# Patient Record
Sex: FEMALE | Race: WHITE | NOT HISPANIC OR LATINO | Employment: FULL TIME | URBAN - METROPOLITAN AREA
[De-identification: names, ages, dates, MRNs, and addresses within clinical notes are randomized per-mention and may not be internally consistent; named-entity substitution may affect disease eponyms.]

---

## 2017-01-27 ENCOUNTER — HOSPITAL ENCOUNTER (OUTPATIENT)
Dept: LAB | Facility: MEDICAL CENTER | Age: 27
End: 2017-01-27
Attending: OBSTETRICS & GYNECOLOGY
Payer: COMMERCIAL

## 2017-01-27 PROCEDURE — 87491 CHLMYD TRACH DNA AMP PROBE: CPT

## 2017-01-27 PROCEDURE — 87624 HPV HI-RISK TYP POOLED RSLT: CPT

## 2017-01-27 PROCEDURE — 88175 CYTOPATH C/V AUTO FLUID REDO: CPT

## 2017-01-27 PROCEDURE — 87591 N.GONORRHOEAE DNA AMP PROB: CPT

## 2017-01-28 LAB
C TRACH DNA GENITAL QL NAA+PROBE: NEGATIVE
CYTOLOGY REG CYTOL: NORMAL
N GONORRHOEA DNA GENITAL QL NAA+PROBE: NEGATIVE
SPECIMEN SOURCE: NORMAL

## 2017-02-01 LAB
HPV HR 12 DNA CVX QL NAA+PROBE: NEGATIVE
HPV16 DNA SPEC QL NAA+PROBE: NEGATIVE
HPV18 DNA SPEC QL NAA+PROBE: NEGATIVE
SPECIMEN SOURCE: NORMAL

## 2017-03-19 ENCOUNTER — NON-PROVIDER VISIT (OUTPATIENT)
Dept: OCCUPATIONAL MEDICINE | Facility: CLINIC | Age: 27
End: 2017-03-19
Payer: COMMERCIAL

## 2017-03-19 ENCOUNTER — HOSPITAL ENCOUNTER (EMERGENCY)
Facility: MEDICAL CENTER | Age: 27
End: 2017-03-19
Attending: EMERGENCY MEDICINE
Payer: COMMERCIAL

## 2017-03-19 VITALS
TEMPERATURE: 98.7 F | BODY MASS INDEX: 20.89 KG/M2 | HEIGHT: 66 IN | DIASTOLIC BLOOD PRESSURE: 67 MMHG | WEIGHT: 130 LBS | SYSTOLIC BLOOD PRESSURE: 109 MMHG | HEART RATE: 69 BPM | RESPIRATION RATE: 16 BRPM

## 2017-03-19 DIAGNOSIS — Z02.83 ENCOUNTER FOR DRUG SCREENING: ICD-10-CM

## 2017-03-19 DIAGNOSIS — Z02.1 PRE-EMPLOYMENT DRUG SCREENING: ICD-10-CM

## 2017-03-19 PROCEDURE — 80300 POCT 11 PANEL URINE DRUG SCREEN - INSTANT: CPT | Performed by: INTERNAL MEDICINE

## 2017-03-19 PROCEDURE — 99026 IN-HOSPITAL ON CALL SERVICE: CPT | Performed by: INTERNAL MEDICINE

## 2017-03-19 PROCEDURE — 99283 EMERGENCY DEPT VISIT LOW MDM: CPT

## 2017-03-19 PROCEDURE — 82075 ASSAY OF BREATH ETHANOL: CPT | Performed by: INTERNAL MEDICINE

## 2017-03-19 RX ORDER — LIDOCAINE HYDROCHLORIDE 20 MG/ML
20 INJECTION, SOLUTION INFILTRATION; PERINEURAL ONCE
Status: DISCONTINUED | OUTPATIENT
Start: 2017-03-19 | End: 2017-03-19 | Stop reason: HOSPADM

## 2017-03-19 ASSESSMENT — PAIN SCALES - GENERAL: PAINLEVEL_OUTOF10: 2

## 2017-03-19 NOTE — ED AVS SNAPSHOT
3/19/2017          Belgica Cesar  [address]  [place] [address]    Dear Belgica:    [facility] wants to ensure your discharge home is safe and you or your loved ones have had all your questions answered regarding your care after you leave the hospital.    You may receive a telephone call within two days of your discharge.  This call is to make certain you understand your discharge instructions as well as ensure we provided you with the best care possible during your stay with us.     The call will only last approximately 3-5 minutes and will be done by a nurse.    Once again, we want to ensure your discharge home is safe and that you have a clear understanding of any next steps in your care.  If you have any questions or concerns, please do not hesitate to contact us, we are here for you.  Thank you for choosing [facility] for your healthcare needs.    Sincerely,    Jeferson Khoury    [facility]

## 2017-03-19 NOTE — DISCHARGE INSTRUCTIONS
Sterile Tape Wound Care  Some cuts and wounds can be closed using sterile tape, also called skin adhesive strips. Skin adhesive strips can be used for shallow (superficial) and simple cuts, wounds, lacerations, and surgical incisions. These strips act in place of stitches to hold the edges of the wound together, allowing for faster healing. Unlike stitches, the adhesive strips do not require needles or anesthetic medicine for placement. The strips will wear off naturally as the wound is healing. It is important to take proper care of your wound at home while it heals.   HOME CARE INSTRUCTIONS  · Try to keep the area around your wound clean and dry. Do not allow the adhesive strips to get wet for the first 12 hours.    · Do not use any soaps or ointments on the wound for the first 12 hours.    · If a bandage (dressing) has been applied, follow your health care provider's instructions for how often to change the dressing. Keep the dressing dry if one has been applied.    · Do not remove the adhesive strips. They will fall off on their own. If they do not, you may remove them gently after 10 days. You should gently wet the strips before removing them. For example, this can be done in the shower.  · Do not scratch, pick, or rub the wound area.    · Protect the wound from further injury until it is healed.    · Protect the wound from sun and tanning bed exposure while it is healing and for several weeks after healing.    · Only take over-the-counter or prescription medicines as directed by your health care provider.    · Keep all follow-up appointments as directed by your health care provider.    SEEK MEDICAL CARE IF:  Your adhesive strips become wet or soaked with blood before the wound has healed. The tape will need to be replaced.   SEEK IMMEDIATE MEDICAL CARE IF:  · You have increasing pain in the wound.    · You develop a rash after the strips are applied.  · Your wound becomes red, swollen, hot, or tender.    · You  have a red streak that goes away from the wound.    · You have pus coming from the wound.    · You have increased bleeding from the wound.  · You notice a bad smell coming from the wound.    · Your wound breaks open.  MAKE SURE YOU:  · Understand these instructions.  · Will watch your condition.  · Will get help right away if you are not doing well or get worse.     This information is not intended to replace advice given to you by your health care provider. Make sure you discuss any questions you have with your health care provider.     Document Released: 01/25/2006 Document Revised: 01/08/2016 Document Reviewed: 07/09/2014  [employer] Interactive Patient Education ©2016 [employer]

## 2017-03-19 NOTE — MR AVS SNAPSHOT
Belgica Cesar   3/19/2017 6:00 PM   Appointment   MRN: [id number]    Department:  [facility]   Dept Phone:  [phone]    Description:  Female : 1990   Provider:  OH NON RENLUCIE SAUNDERS           Allergies as of 3/19/2017     Not on File      Basic Information     Date Of Birth Sex Race Ethnicity Preferred Language    1990 Female White Non- English      Health Maintenance        Date Due Completion Dates    IMM HEP A VACCINE (1 of 2 - Standard Series) 1991 ---    IMM HPV VACCINE (1 of 3 - Female 3 Dose Series) 2001 ---    IMM VARICELLA (CHICKENPOX) VACCINE (1 of 2 - 2 Dose Adolescent Series) 2003 ---    IMM DTaP/Tdap/Td Vaccine (1 - Tdap) 2009 ---    IMM HEP B VACCINE (3 of 3 - Primary Series) 2016 3/17/2016, 2016    PAP SMEAR 2020            Current Immunizations     Hepatitis B Vaccine Recombivax (Adol/Adult) 3/17/2016, 2016    Influenza Vaccine Quad Inj (Pf) 10/6/2016    Influenza Vaccine Quad Inj (Preserved) 2016      Below and/or attached are the medications your provider expects you to take. Review all of your home medications and newly ordered medications with your provider and/or pharmacist. Follow medication instructions as directed by your provider and/or pharmacist. Please keep your medication list with you and share with your provider. Update the information when medications are discontinued, doses are changed, or new medications (including over-the-counter products) are added; and carry medication information at all times in the event of emergency situations     Allergies:  No Known Allergies          Medications  Valid as of: 2017 -  1:46 PM    Generic Name Brand Name Tablet Size Instructions for use    Cyclobenzaprine HCl (Tab) FLEXERIL 10 MG Take 1 Tab by mouth 3 times a day as needed.        .                 Medicines prescribed today were sent to:     [facility] [id number] [place] [address], [place] - [address] AT [facility] [address]    [address] [place] [address] [address] [place] [address]    Phone: [phone] Fax: [phone]    Open 24 Hours?: No      Medication refill instructions:       If your prescription bottle indicates you have medication refills left, it is not necessary to call your provider’s office. Please contact your pharmacy and they will refill your medication.    If your prescription bottle indicates you do not have any refills left, you may request refills at any time through one of the following ways: The online [employer] system (except Urgent Care), by calling your provider’s office, or by asking your pharmacy to contact your provider’s office with a refill request. Medication refills are processed only during regular business hours and may not be available until the next business day. Your provider may request additional information or to have a follow-up visit with you prior to refilling your medication.   *Please Note: Medication refills are assigned a new Rx number when refilled electronically. Your pharmacy may indicate that no refills were authorized even though a new prescription for the same medication is available at the pharmacy. Please request the medicine by name with the pharmacy before contacting your provider for a refill.           [employer] Access Code: Activation code not generated  Current [employer] Status: Active

## 2017-03-19 NOTE — LETTER
"  FORM C-4:  EMPLOYEE’S CLAIM FOR COMPENSATION/ REPORT OF INITIAL TREATMENT  EMPLOYEE’S CLAIM - PROVIDE ALL INFORMATION REQUESTED   First Name  Belgica Last Name  Arsenio Birthdate             Age  1990 26 y.o. 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 (if applicable)  Cleaning istrumentation equipment    How did this injury or occupational disease occur? Be specific and answer in detail. Use additional sheet if necessary)  I was cleaning an old label off of a container with a razor blade and it slipped and sliced my thumb   If you believe that you have an occupational disease, when did you first have knowledge of the disability and it relationship to your employment?  n/a Witnesses to the Accident  Mistyunruly Collados     Nature of Injury or Occupational Disease  Laceration  Part(s) of Body Injured or Affected  Thumb (L), N/A, N/A    I certify that the above is true and correct to the best of my knowledge and that I have provided this information in order to " obtain the benefits of [place]  I hereby authorize any physician, chiropractor, surgeon, practitioner, or other person, any hospital, including [facility], any medical service organization, any insurance company, or other institution or organization to release to each other, any medical or other information, including benefits paid or payable, pertinent to this injury or disease, except information relative to diagnosis, treatment and/or counseling for AIDS, psychological conditions, alcohol or controlled substances, for which I must give specific authorization.  A Photostat of this authorization shall be as valid as the original.   Date  03/19/2017 Place  [facility] Employee’s Signature   THIS REPORT MUST BE COMPLETED AND MAILED WITHIN 3 WORKING DAYS OF TREATMENT   Place  [facility], EMERGENCY DEPT  Name of Facility   [facility]   Date  3/19/2017 Diagnosis  (T14.8) Laceration Is there evidence the injured employee was under the influence of alcohol and/or another controlled substance at the time of accident?   Hour  2:25 PM Description of Injury or Disease  Laceration No   Treatment  Steri-Strips  Have you advised the patient to remain off work five days or more?         No   X-Ray Findings      If Yes   From Date    To Date      From information given by the employee, together with medical evidence, can you directly connect this injury or occupational disease as job incurred?  Yes If No, is the employee capable of: Full Duty  Yes Modified Duty      Is additional medical care by a physician indicated?  Yes If Modified Duty, Specify any Limitations / Restrictions        Do you know of any previous injury or disease contributing to this condition or occupational disease?  No   Date  3/19/2017 Print Doctor’s Name  Joon Mace  "certify the employer’s copy of this form was mailed on:03/19/2017   Address  [address] [place] [address]  [place] [address] [phone] Insurer’s Use Only   [facility]  [address]    Provider’s Tax ID Number  [id number] Telephone  Dept: [phone]    Doctor’s Signature  e-SignDE NILES FOFANA M.D. Degree  MD    Original - TREATING PHYSICIAN OR CHIROPRACTOR   Pg 2-Insurer/TPA   Pg 3-Employer   Pg 4-Employee                                                                                                  Form C-4 (rev01/03)     BRIEF DESCRIPTION OF RIGHTS AND BENEFITS  (Pursuant to [place] 616C.[place])    Notice of Injury or Occupational Disease (Incident Report Form C-1): If an injury or occupational disease (OD) arises out of and in the course of employment, you must provide written notice to your employer as soon as practicable, but no later than 7 days after the accident or OD. Your employer shall maintain a sufficient supply of the required forms.    Claim for Compensation (Form C-4): If medical treatment is sought, the form C-4 is available at the place of initial treatment. A completed \"Claim for Compensation\" (Form C-4) must be filed within 90 days after an accident or OD. The treating physician or chiropractor must, within 3 working days after treatment, complete and mail to the employer, the employer's insurer and third-party , the Claim for Compensation.    Medical Treatment: If you require medical treatment for your on-the-job injury or OD, you may be required to select a physician or chiropractor from a list provided by your workers’ compensation insurer, if it has contracted with an Organization for Managed Care (MCO) or Preferred Provider Organization (PPO) or providers of health care. If your employer has not entered into a contract with an MCO or PPO, you may select a physician or chiropractor from the Panel of Physicians and Chiropractors. Any medical costs related to your " industrial injury or OD will be paid by your insurer.    Temporary Total Disability (TTD): If your doctor has certified that you are unable to work for a period of at least 5 consecutive days, or 5 cumulative days in a 20-day period, or places restrictions on you that your employer does not accommodate, you may be entitled to TTD compensation.    Temporary Partial Disability (TPD): If the wage you receive upon reemployment is less than the compensation for TTD to which you are entitled, the insurer may be required to pay you TPD compensation to make up the difference. TPD can only be paid for a maximum of 24 months.    Permanent Partial Disability (PPD): When your medical condition is stable and there is an indication of a PPD as a result of your injury or OD, within 30 days, your insurer must arrange for an evaluation by a rating physician or chiropractor to determine the degree of your PPD. The amount of your PPD award depends on the date of injury, the results of the PPD evaluation and your age and wage.    Permanent Total Disability (PTD): If you are medically certified by a treating physician or chiropractor as permanently and totally disabled and have been granted a PTD status by your insurer, you are entitled to receive monthly benefits not to exceed 66 2/3% of your average monthly wage. The amount of your PTD payments is subject to reduction if you previously received a PPD award.    Vocational Rehabilitation Services: You may be eligible for vocational rehabilitation services if you are unable to return to the job due to a permanent physical impairment or permanent restrictions as a result of your injury or occupational disease.    Transportation and Per Srinivasan Reimbursement: You may be eligible for travel expenses and per srinivasan associated with medical treatment.  Reopening: You may be able to reopen your claim if your condition worsens after claim closure.    Appeal Process: If you disagree with a written  determination issued by the insurer or the insurer does not respond to your request, you may appeal to the Department of Administration, , by following the instructions contained in your determination letter. You must appeal the determination within 70 days from the date of the determination letter at [address], [address], [place], [place] [address], or [address], [address], [place], [place] [address]. If you disagree with the  decision, you may appeal to the Department of Administration, . You must file your appeal within 30 days from the date of the  decision letter at [address], [address], [place], [place] [address], or [address], [facility] [address], [place], [place] [address]. If you disagree with a decision of an , you may file a petition for judicial review with the District Court. You must do so within 30 days of the Appeal Officer’s decision. You may be represented by an  at your own expense or you may contact the [facility] for possible representation.    [place]  for Injured Workers (NAIW): If you disagree with a  decision, you may request that NAIW represent you without charge at an  Hearing. For information regarding denial of benefits, you may contact the [facility] at: [address], [address], [place] [address], [phone], or [address], [place] [address], [phone]    To File a Complaint with the Division: If you wish to file a complaint with the  of the Division of Industrial Relations (DIR), please contact the Workers’ Compensation Section, [address], [address], [place], [place] [address], telephone [phone]) [phone], or [address], [facility] [address], [place] [address], telephone [phone].    For assistance with Workers’ Compensation Issues: you may contact the Office of  the [employer] [facility] Consumer Health Assistance, [address] [place] [address], [address], [place] [address], Toll Free 1-132.605.6371, Web site: http://govcha.[facility].[place]., E-mail [email or web address].                                                                                                                                                                               __________________________________________________________________                                    03/19/2017            Employee Name / Signature                                                                                                                            Date                                       D-2 (rev. 10/07)

## 2017-03-19 NOTE — ED AVS SNAPSHOT
Tr[employer] Access Code: Activation code not generated  Current Triloq Status: Active    [employer]  A secure, online tool to manage your health information     [employer] Triloq® is a secure, online tool that connects you to your personalized health information from the privacy of your home -- day or night - making it very easy for you to manage your healthcare. Once the activation process is completed, you can even access your medical information using the Triloq chapincito, which is available for free in the Apple Chapincito store or Google Play store.     Triloq provides the following levels of access (as shown below):   My Chart Features   [facility] Primary Care Doctor [facility]  Specialists [facility]  Urgent  Care Non-[facility]  Primary Care  Doctor   Email your healthcare team securely and privately 24/7 X X X X   Manage appointments: schedule your next appointment; view details of past/upcoming appointments X      Request prescription refills. X      View recent personal medical records, including lab and immunizations X X X X   View health record, including health history, allergies, medications X X X X   Read reports about your outpatient visits, procedures, consult and ER notes X X X X   See your discharge summary, which is a recap of your hospital and/or ER visit that includes your diagnosis, lab results, and care plan. X X       How to register for Triloq:  1. Go to  [email or web address].  2. Click on the Sign Up Now box, which takes you to the New Member Sign Up page. You will need to provide the following information:  a. Enter your Triloq Access Code exactly as it appears at the top of this page. (You will not need to use this code after you’ve completed the sign-up process. If you do not sign up before the expiration date, you must request a new code.)   b. Enter your date of birth.   c. Enter your home email address.   d. Click Submit, and follow the next screen’s instructions.  3. Create a Triloq ID. This will  be your [employer] login ID and cannot be changed, so think of one that is secure and easy to remember.  4. Create a [employer] password. You can change your password at any time.  5. Enter your Password Reset Question and Answer. This can be used at a later time if you forget your password.   6. Enter your e-mail address. This allows you to receive e-mail notifications when new information is available in [employer].  7. Click Sign Up. You can now view your health information.    For assistance activating your [employer] account, call [phone]

## 2017-03-19 NOTE — ED AVS SNAPSHOT
Home Care Instructions                                                                                                                Belgica Cesar   MRN: [id number]    Department:  [facility], Emergency Dept   Date of Visit:  3/19/2017            [facility], Emergency Dept    [address] [place] [id number]    Phone:  [phone]      You were seen by     Joon Marcelo M.D.      Your Diagnosis Was     Laceration     T14.8       Follow-up Information     1. Follow up with [facility], Emergency Dept.    Specialty:  Emergency Medicine    Why:  If symptoms worsen    Contact information    [address] [place] [id number] [phone]        2. Please follow up.    Why:  call your insurance for a preferred provider to establish a primary care doctor      Medication Information     Review all of your home medications and newly ordered medications with your primary doctor and/or pharmacist as soon as possible. Follow medication instructions as directed by your doctor and/or pharmacist.     Please keep your complete medication list with you and share with your physician. Update the information when medications are discontinued, doses are changed, or new medications (including over-the-counter products) are added; and carry medication information at all times in the event of emergency situations.               Medication List      ASK your doctor about these medications        Instructions    Morning Afternoon Evening Bedtime    cyclobenzaprine 10 MG Tabs   Commonly known as:  FLEXERIL        Take 1 Tab by mouth 3 times a day as needed.   Dose:  10 mg                                  Discharge Instructions       Sterile Tape Wound Care  Some cuts and wounds can be closed using sterile tape, also called skin adhesive strips. Skin adhesive strips can be used for shallow (superficial) and simple cuts, wounds, lacerations, and surgical incisions.  These strips act in place of stitches to hold the edges of the wound together, allowing for faster healing. Unlike stitches, the adhesive strips do not require needles or anesthetic medicine for placement. The strips will wear off naturally as the wound is healing. It is important to take proper care of your wound at home while it heals.   HOME CARE INSTRUCTIONS  · Try to keep the area around your wound clean and dry. Do not allow the adhesive strips to get wet for the first 12 hours.    · Do not use any soaps or ointments on the wound for the first 12 hours.    · If a bandage (dressing) has been applied, follow your health care provider's instructions for how often to change the dressing. Keep the dressing dry if one has been applied.    · Do not remove the adhesive strips. They will fall off on their own. If they do not, you may remove them gently after 10 days. You should gently wet the strips before removing them. For example, this can be done in the shower.  · Do not scratch, pick, or rub the wound area.    · Protect the wound from further injury until it is healed.    · Protect the wound from sun and tanning bed exposure while it is healing and for several weeks after healing.    · Only take over-the-counter or prescription medicines as directed by your health care provider.    · Keep all follow-up appointments as directed by your health care provider.    SEEK MEDICAL CARE IF:  Your adhesive strips become wet or soaked with blood before the wound has healed. The tape will need to be replaced.   SEEK IMMEDIATE MEDICAL CARE IF:  · You have increasing pain in the wound.    · You develop a rash after the strips are applied.  · Your wound becomes red, swollen, hot, or tender.    · You have a red streak that goes away from the wound.    · You have pus coming from the wound.    · You have increased bleeding from the wound.  · You notice a bad smell coming from the wound.    · Your wound breaks open.  MAKE SURE  YOU:  · Understand these instructions.  · Will watch your condition.  · Will get help right away if you are not doing well or get worse.     This information is not intended to replace advice given to you by your health care provider. Make sure you discuss any questions you have with your health care provider.     Document Released: 01/25/2006 Document Revised: 01/08/2016 Document Reviewed: 07/09/2014  [employer] Patient Education ©2016 [employer]            Patient Information     Patient Information    Following emergency treatment: all patient requiring follow-up care must return either to a private physician or a clinic if your condition worsens before you are able to obtain further medical attention, please return to the emergency room.     Billing Information    At [facility], we work to make the billing process streamlined for our patients.  Our Representatives are here to answer any questions you may have regarding your hospital bill.  If you have insurance coverage and have supplied your insurance information to us, we will submit a claim to your insurer on your behalf.  Should you have any questions regarding your bill, we can be reached online or by phone as follows:  Online: You are able pay your bills online or live chat with our representatives about any billing questions you may have. We are here to help Monday - Friday from 8:00am to 7:30pm and 9:00am - 12:00pm on Saturdays.  Please visit [email or web address]  for more information.   Phone:  [phone] or [phone]    Please note that your emergency physician, surgeon, pathologist, radiologist, anesthesiologist, and other specialists are not employed by [facility] and will therefore bill separately for their services.  Please contact them directly for any questions concerning their bills at the numbers below:     Emergency Physician Services:  [phone]  [facility]:   [phone]  [facility]:  [phone]  [facility]:  [phone]    1. Your final bill may vary from the amount quoted upon discharge if all procedures are not complete at that time, or if your doctor has additional procedures of which we are not aware. You will receive an additional bill if you return to the Emergency Department at [facility] for suture removal regardless of the facility of which the sutures were placed.     2. Please arrange for settlement of this account at the emergency registration.    3. All self-pay accounts are due in full at the time of treatment.  If you are unable to meet this obligation then payment is expected within 4-5 days.     4. If you have had radiology studies (CT, X-ray, Ultrasound, MRI), you have received a preliminary result during your emergency department visit. Please contact the radiology department [phone] to receive a copy of your final result. Please discuss the Final result with your primary physician or with the follow up physician provided.     Crisis Hotline:  [place] Crisis Hotline:  4-767-LUHOANA or 1-376.304.9389  [place] Crisis Hotline:    1-447.675.8523 or 486-179-0982         ED Discharge Follow Up Questions    1. In order to provide you with very good care, we would like to follow up with a phone call in the next few days.  May we have your permission to contact you?     YES /  NO    2. What is the best phone number to call you? (       )_____-__________    3. What is the best time to call you?      Morning  /  Afternoon  /  Evening                   Patient Signature:  ____________________________________________________________    Date:  ____________________________________________________________

## 2017-03-19 NOTE — ED NOTES
Chief Complaint   Patient presents with   • Hand Laceration     pt cut L thumb with razor blade while at work. Bleeding stopped upon arrival.

## 2017-03-19 NOTE — ED PROVIDER NOTES
"ED Provider Note    Scribed for Joon Marcelo M.D. by Calvin Ross. 3/19/2017, 1:11 PM.    Primary care provider: Pcp Pt States None  Means of arrival: Walk-in  History obtained from: Patient  History limited by: None    CHIEF COMPLAINT  Chief Complaint   Patient presents with   • Hand Laceration     pt cut L thumb with razor blade while at work. Bleeding stopped upon arrival.        HPI  Belgica Cesar is a 26 y.o. female who presents to the Emergency Department with left thumb laceration onset prior to arrival. The patient states she was at work when she accidentally cut her left thumb with a razor blade. She placed pressure on the area immediately with gauze and stated the bleeding stopped upon arrival. She denies focal numbness. Patient believes she may have received a tetanus shot 2 years ago, but knows it was within the last 10 years. This was a clean razor blade at work, low risk for tetanus.    REVIEW OF SYSTEMS  Pertinent positives include left thumb laceration. Pertinent negatives include no focal numbess. As above, all other systems reviewed and are negative. C.   See HPI for further details.     PAST MEDICAL HISTORY  The patient has no chronic medical history.    SURGICAL HISTORY  patient denies any surgical history    SOCIAL HISTORY  Works at [employer].     FAMILY HISTORY  No family history noted    CURRENT MEDICATIONS  No current medications.     ALLERGIES  No known allergies.     PHYSICAL EXAM  VITAL SIGNS: /67 mmHg  Pulse 69  Temp(Src) 37.1 °C (98.7 °F)  Resp 16  Ht 1.676 m (5' 6\")  Wt 58.968 kg (130 lb)  BMI 20.99 kg/m2  Vitals reviewed.  Constitutional: Alert in no apparent distress.  HENT: No signs of trauma, Bilateral external ears normal, Nose normal.   Eyes: Pupils are equal and reactive, Conjunctiva normal, Non-icteric.   Neck: Normal range of motion, No tenderness, Supple, No stridor.   Lymphatic: No lymphadenopathy noted.   Skin: Warm, Dry, 2cm laceration of the left thumb. "   Extremities: Intact distal pulses, No cyanosis, see skin exam above.  Musculoskeletal: Good range of motion in all other major joints.  Neurologic: Alert, Normal motor function and normal sensory function of the left thumb  Psychiatric: Affect normal, Judgment normal, Mood normal.     DIAGNOSTIC STUDIES / PROCEDURES  Laceration Repair Procedure Note    Indication: Laceration    Procedure: The patient's thumb was irrigated, benzoin was placed around the cut, it is a flap-type of wound that sutures will not hold. The wound is closed with Steri-Strips. Adaptic and tape was wrapped around the wound. There were no competitions.    Total repaired wound length: 2 cm.     Other Items: None    The patient tolerated the procedure well.    Complications: None    COURSE & MEDICAL DECISION MAKING  Nursing notes, VS, PMSFHx reviewed in chart.    1:11 PM Patient seen and examined at bedside. Discussed laceration repair with the patient using steri strips, which she understands, is a flap wound.     The patient will return for new or worsening symptoms including fever and is stable at the time of discharge.    C4 form is filled out as this was work-related.    I encouraged the patient not to do vigorous exercise with the left thumb but she has a competition coming up, she understands the risks.    The patient is referred to a primary physician through her insurance for blood pressure management, diabetic screening, and for all other preventative health concerns.    DISPOSITION:  Patient will be discharged home in stable condition.    FOLLOW UP:  [facility], Emergency Dept  [address] [address] [phone]    If symptoms worsen          call your insurance for a preferred provider to establish a primary care doctor      OUTPATIENT MEDICATIONS:  New Prescriptions    No medications on file     FINAL IMPRESSION  2 cm left thumb laceration with one layer Steri-Strip closure by BARTOLO ARCOS,  Calvin Ross (Krysta), am scribing for, and in the presence of, Joon Marcelo M.D..    Electronically signed by: Calvin Ross (Krysta), 3/19/2017    IJoon M.D. personally performed the services described in this documentation, as scribed by Calvin Ross in my presence, and it is both accurate and complete.    The note accurately reflects work and decisions made by me.  Joon Marcelo  3/19/2017  2:17 PM

## 2017-03-19 NOTE — LETTER
"  FORM C-4:  EMPLOYEE’S CLAIM FOR COMPENSATION/ REPORT OF INITIAL TREATMENT  EMPLOYEE’S CLAIM - PROVIDE ALL INFORMATION REQUESTED   First Name  Belgica Last Name  Arsenio Birthdate             Age  1990 26 y.o. Sex  female Claim Number   Home Employee Address  [address]                                     [place]  [address] Height  1.676 m (5' 6\") Weight  58.968 kg (130 lb) [employer]  [id number]   Mailing Employee Address                           [address]               Zip  [address] Telephone  [phone] (home)  Primary Language Spoken  ENGLISH   Insurer  *** Third Party   WORKERS CHOICE Employee's Occupation (Job Title) When Injury or Occupational Disease Occurred  [employer]   Employer's Name  [employer] Telephone  [phone]    Employer Address  [address]] Zip  [address]   Date of Injury  3/19/2017       Hour of Injury  12:00 PM Date Employer Notified  3/19/2017 Last Day of Work after Injury or Occupational Disease  3/19/2017 Supervisor to Whom Injury Reported  Kasey Yarbrough   Address or Location of Accident (if applicable)     What were you doing at the time of accident? (if applicable)  Cleaning istrumentation equipment    How did this injury or occupational disease occur? Be specific and answer in detail. Use additional sheet if necessary)  I was cleaning an old label off of a container with a razor blade and it slipped and sliced my thumb   If you believe that you have an occupational disease, when did you first have knowledge of the disability and it relationship to your employment?  n/a Witnesses to the Accident  Misty Shine     Nature of Injury or Occupational Disease  Laceration  Part(s) of Body Injured or Affected  Thumb (L), N/A, N/A    I certify that the above is true and correct to the best of my knowledge and that I have provided this information in order to obtain the benefits of " [place]  I hereby authorize any physician, chiropractor, surgeon, practitioner, or other person, any hospital, including [facility] or [facility], any medical service organization, any insurance company, or other institution or organization to release to each other, any medical or other information, including benefits paid or payable, pertinent to this injury or disease, except information relative to diagnosis, treatment and/or counseling for AIDS, psychological conditions, alcohol or controlled substances, for which I must give specific authorization.  A Photostat of this authorization shall be as valid as the original.   Date Place   Employee’s Signature   THIS REPORT MUST BE COMPLETED AND MAILED WITHIN 3 WORKING DAYS OF TREATMENT   Place  [facility], EMERGENCY DEPT  Name of Facility   [facility]   Date  3/19/2017 Diagnosis  (T14.8) Laceration Is there evidence the injured employee was under the influence of alcohol and/or another controlled substance at the time of accident?   Hour  2:14 PM Description of Injury or Disease  Laceration     Treatment     Have you advised the patient to remain off work five days or more?             X-Ray Findings      If Yes   From Date    To Date      From information given by the employee, together with medical evidence, can you directly connect this injury or occupational disease as job incurred?    If No, is the employee capable of: Full Duty    Modified Duty      Is additional medical care by a physician indicated?    If Modified Duty, Specify any Limitations / Restrictions        Do you know of any previous injury or disease contributing to this condition or occupational disease?      Date  3/19/2017 Print Doctor’s Name  Joon Mace I certify the employer’s copy of this form was mailed on:  "  Address  [address] [id number] [phone] Insurer’s Use Only   [address] [address] [facility]  [address]    Provider’s Tax ID Number    Telephone  Dept: [phone]    Doctor’s Signature    Degree       Original - TREATING PHYSICIAN OR CHIROPRACTOR   Pg 2-Insurer/TPA   Pg 3-Employer   Pg 4-Employee                                                                                                  Form C-4 (rev01/03)     BRIEF DESCRIPTION OF RIGHTS AND BENEFITS  (Pursuant to [place] 616[place])    Notice of Injury or Occupational Disease (Incident Report Form C-1): If an injury or occupational disease (OD) arises out of and in the course of employment, you must provide written notice to your employer as soon as practicable, but no later than 7 days after the accident or OD. Your employer shall maintain a sufficient supply of the required forms.    Claim for Compensation (Form C-4): If medical treatment is sought, the form C-4 is available at the place of initial treatment. A completed \"Claim for Compensation\" (Form C-4) must be filed within 90 days after an accident or OD. The treating physician or chiropractor must, within 3 working days after treatment, complete and mail to the employer, the employer's insurer and third-party , the Claim for Compensation.    Medical Treatment: If you require medical treatment for your on-the-job injury or OD, you may be required to select a physician or chiropractor from a list provided by your workers’ compensation insurer, if it has contracted with an Organization for Managed Care (MCO) or Preferred Provider Organization (PPO) or providers of health care. If your employer has not entered into a contract with an MCO or PPO, you may select a physician or chiropractor from the Panel of Physicians and Chiropractors. Any medical costs related to your industrial injury or OD will be paid by your insurer.    Temporary Total Disability (TTD): If your doctor has " certified that you are unable to work for a period of at least 5 consecutive days, or 5 cumulative days in a 20-day period, or places restrictions on you that your employer does not accommodate, you may be entitled to TTD compensation.    Temporary Partial Disability (TPD): If the wage you receive upon reemployment is less than the compensation for TTD to which you are entitled, the insurer may be required to pay you TPD compensation to make up the difference. TPD can only be paid for a maximum of 24 months.    Permanent Partial Disability (PPD): When your medical condition is stable and there is an indication of a PPD as a result of your injury or OD, within 30 days, your insurer must arrange for an evaluation by a rating physician or chiropractor to determine the degree of your PPD. The amount of your PPD award depends on the date of injury, the results of the PPD evaluation and your age and wage.    Permanent Total Disability (PTD): If you are medically certified by a treating physician or chiropractor as permanently and totally disabled and have been granted a PTD status by your insurer, you are entitled to receive monthly benefits not to exceed 66 2/3% of your average monthly wage. The amount of your PTD payments is subject to reduction if you previously received a PPD award.    Vocational Rehabilitation Services: You may be eligible for vocational rehabilitation services if you are unable to return to the job due to a permanent physical impairment or permanent restrictions as a result of your injury or occupational disease.    Transportation and Per Srinivasan Reimbursement: You may be eligible for travel expenses and per srinivasan associated with medical treatment.  Reopening: You may be able to reopen your claim if your condition worsens after claim closure.    Appeal Process: If you disagree with a written determination issued by the insurer or the insurer does not respond to your request, you may appeal to the  Department of Administration, , by following the instructions contained in your determination letter. You must appeal the determination within 70 days from the date of the determination letter at [address], [address], [place], [place] [address], or [address], [address], [place], [place] [address]. If you disagree with the  decision, you may appeal to the Department of Administration, . You must file your appeal within 30 days from the date of the  decision letter at [address], [address], [place], [place] [address], or [address], [address], [place], [place] [address]. If you disagree with a decision of an , you may file a petition for judicial review with the District Court. You must do so within 30 days of the Appeal Officer’s decision. You may be represented by an  at your own expense or you may contact the [facility] for possible representation.    [place]  for Injured Workers (NAIW): If you disagree with a  decision, you may request that NAIW represent you without charge at an  Hearing. For information regarding denial of benefits, you may contact the [facility] at: [address], [address], [place] [address], [phone], or [address], [address], [place] [address], [phone]    To File a Complaint with the Division: If you wish to file a complaint with the  of the Division of Industrial Relations (DIR), please contact the Workers’ Compensation Section, [address], [address], [place], [place] [address], telephone [phone]) [phone], or [address], [address], [place] [address], telephone [phone]) [phone].    For assistance with Workers’ Compensation Issues: you may contact the Office of the Governor Consumer Health Assistance, [address], [address], [place], [place] [address], Toll  Free 1-128.521.8026, Web site: http://alhaji..nv., E-mail rose@alhaji..nv.                                                                                                                                                                               __________________________________________________________________                                    _________________            Employee Name / Signature                                                                                                                            Date                                       D-2 (rev. 10/07)

## 2017-03-20 LAB
AMP AMPHETAMINE: NORMAL
BAR BARBITURATES: NORMAL
BREATH ALCOHOL COMMENT: NORMAL
BZO BENZODIAZEPINES: NORMAL
COC COCAINE: NORMAL
INT CON NEG: NORMAL
INT CON POS: NORMAL
MDMA ECSTASY: NORMAL
MET METHAMPHETAMINES: NORMAL
MTD METHADONE: NORMAL
OPI OPIATES: NORMAL
OXY OXYCODONE: NORMAL
PCP PHENCYCLIDINE: NORMAL
POC BREATHALIZER: 0 PERCENT (ref 0–0.01)
POC URINE DRUG SCREEN OCDRS: NEGATIVE
THC: NORMAL

## 2017-03-20 NOTE — PROGRESS NOTES
Lorne MICHELLE was called out after business hours to [facility] for a post-accident UDS and BAT on 3/19/17 for [employer].    UDS collected at 2:14 pm.  BAT collected at 1:30 pm.

## 2017-06-06 ENCOUNTER — HOSPITAL ENCOUNTER (EMERGENCY)
Facility: MEDICAL CENTER | Age: 27
End: 2017-06-07
Attending: EMERGENCY MEDICINE
Payer: COMMERCIAL

## 2017-06-06 ENCOUNTER — NON-PROVIDER VISIT (OUTPATIENT)
Dept: OCCUPATIONAL MEDICINE | Facility: CLINIC | Age: 27
End: 2017-06-06
Payer: COMMERCIAL

## 2017-06-06 ENCOUNTER — HOSPITAL ENCOUNTER (OUTPATIENT)
Dept: LAB | Facility: MEDICAL CENTER | Age: 27
End: 2017-06-06
Attending: OBSTETRICS & GYNECOLOGY
Payer: COMMERCIAL

## 2017-06-06 DIAGNOSIS — Z57.8 EMPLOYEE EXPOSURE TO BODY FLUIDS: ICD-10-CM

## 2017-06-06 DIAGNOSIS — Z02.83 ENCOUNTER FOR DRUG SCREENING: ICD-10-CM

## 2017-06-06 DIAGNOSIS — Z02.1 PRE-EMPLOYMENT DRUG SCREENING: ICD-10-CM

## 2017-06-06 LAB
B-HCG SERPL-ACNC: <0.6 MIU/ML (ref 0–5)
BASOPHILS # BLD AUTO: 0.6 % (ref 0–1.8)
BASOPHILS # BLD: 0.04 K/UL (ref 0–0.12)
EOSINOPHIL # BLD AUTO: 0.29 K/UL (ref 0–0.51)
EOSINOPHIL NFR BLD: 4.2 % (ref 0–6.9)
ERYTHROCYTE [DISTWIDTH] IN BLOOD BY AUTOMATED COUNT: 40.1 FL (ref 35.9–50)
HBV CORE AB SERPL QL IA: NEGATIVE
HBV SURFACE AB SERPL IA-ACNC: 238.55 MIU/ML (ref 0–10)
HBV SURFACE AG SER QL: NEGATIVE
HCT VFR BLD AUTO: 41.6 % (ref 37–47)
HCV AB SER QL: NEGATIVE
HGB BLD-MCNC: 13.9 G/DL (ref 12–16)
HIV 1+2 AB+HIV1 P24 AG SERPL QL IA: NON REACTIVE
IMM GRANULOCYTES # BLD AUTO: 0.01 K/UL (ref 0–0.11)
IMM GRANULOCYTES NFR BLD AUTO: 0.1 % (ref 0–0.9)
LYMPHOCYTES # BLD AUTO: 2.56 K/UL (ref 1–4.8)
LYMPHOCYTES NFR BLD: 37.2 % (ref 22–41)
MCH RBC QN AUTO: 30.2 PG (ref 27–33)
MCHC RBC AUTO-ENTMCNC: 33.4 G/DL (ref 33.6–35)
MCV RBC AUTO: 90.4 FL (ref 81.4–97.8)
MONOCYTES # BLD AUTO: 0.44 K/UL (ref 0–0.85)
MONOCYTES NFR BLD AUTO: 6.4 % (ref 0–13.4)
NEUTROPHILS # BLD AUTO: 3.55 K/UL (ref 2–7.15)
NEUTROPHILS NFR BLD: 51.5 % (ref 44–72)
NRBC # BLD AUTO: 0 K/UL
NRBC BLD AUTO-RTO: 0 /100 WBC
PLATELET # BLD AUTO: 198 K/UL (ref 164–446)
PMV BLD AUTO: 12.6 FL (ref 9–12.9)
RBC # BLD AUTO: 4.6 M/UL (ref 4.2–5.4)
TSH SERPL DL<=0.005 MIU/L-ACNC: 1.13 UIU/ML (ref 0.3–3.7)
WBC # BLD AUTO: 6.9 K/UL (ref 4.8–10.8)

## 2017-06-06 PROCEDURE — 85025 COMPLETE CBC W/AUTO DIFF WBC: CPT

## 2017-06-06 PROCEDURE — 86706 HEP B SURFACE ANTIBODY: CPT

## 2017-06-06 PROCEDURE — 86704 HEP B CORE ANTIBODY TOTAL: CPT

## 2017-06-06 PROCEDURE — 84702 CHORIONIC GONADOTROPIN TEST: CPT

## 2017-06-06 PROCEDURE — 82075 ASSAY OF BREATH ETHANOL: CPT | Performed by: INTERNAL MEDICINE

## 2017-06-06 PROCEDURE — 80305 DRUG TEST PRSMV DIR OPT OBS: CPT | Performed by: INTERNAL MEDICINE

## 2017-06-06 PROCEDURE — 99283 EMERGENCY DEPT VISIT LOW MDM: CPT

## 2017-06-06 PROCEDURE — 87389 HIV-1 AG W/HIV-1&-2 AB AG IA: CPT

## 2017-06-06 PROCEDURE — 87340 HEPATITIS B SURFACE AG IA: CPT

## 2017-06-06 PROCEDURE — 84443 ASSAY THYROID STIM HORMONE: CPT

## 2017-06-06 PROCEDURE — 36415 COLL VENOUS BLD VENIPUNCTURE: CPT

## 2017-06-06 PROCEDURE — 99026 IN-HOSPITAL ON CALL SERVICE: CPT | Performed by: INTERNAL MEDICINE

## 2017-06-06 PROCEDURE — 86803 HEPATITIS C AB TEST: CPT

## 2017-06-06 SDOH — HEALTH STABILITY - PHYSICAL HEALTH: OCCUPATIONAL EXPOSURE TO OTHER RISK FACTORS: Z57.8

## 2017-06-06 NOTE — MR AVS SNAPSHOT
Belgica Cesar   2017 6:20 PM   Appointment   MRN: [id number]    Department:  [facility]   Dept Phone:  [phone]    Description:  Female : 1990   Provider:  [facility] NON [employer] [facility]           Allergies as of 2017     Allergen Noted Reactions    Pcn [Penicillins] 2017   Hives      Basic Information     Date Of Birth Sex Race Ethnicity Preferred Language    1990 Female White Non- English      Health Maintenance        Date Due Completion Dates    IMM HEP A VACCINE (1 of 2 - Standard Series) 1991 ---    IMM HPV VACCINE (1 of 3 - Female 3 Dose Series) 2001 ---    IMM VARICELLA (CHICKENPOX) VACCINE (1 of 2 - 2 Dose Adolescent Series) 2003 ---    IMM DTaP/Tdap/Td Vaccine (1 - Tdap) 2009 ---    IMM HEP B VACCINE (3 of 3 - Primary Series) 2016 3/17/2016, 2016    PAP SMEAR 2020            Current Immunizations     Hepatitis B Vaccine Recombivax (Adol/Adult) 3/17/2016, 2016    Influenza Vaccine Quad Inj (Pf) 10/6/2016    Influenza Vaccine Quad Inj (Preserved) 2016      Below and/or attached are the medications your provider expects you to take. Review all of your home medications and newly ordered medications with your provider and/or pharmacist. Follow medication instructions as directed by your provider and/or pharmacist. Please keep your medication list with you and share with your provider. Update the information when medications are discontinued, doses are changed, or new medications (including over-the-counter products) are added; and carry medication information at all times in the event of emergency situations     Allergies:  PCN - Hives               Medications  Valid as of: Lenore 15, 2017 -  2:40 PM    Generic Name Brand Name Tablet Size Instructions for use    Cyclobenzaprine HCl (Tab) FLEXERIL 10 MG Take 1 Tab by mouth 3 times a day as needed.        .                 Medicines prescribed today were sent to:       [id number] - [place], [place] - [address] AT [facility] [address]    [address] [place] [address]    Phone: [phone] Fax: [phone]    Open 24 Hours?: No      Medication refill instructions:       If your prescription bottle indicates you have medication refills left, it is not necessary to call your provider’s office. Please contact your pharmacy and they will refill your medication.    If your prescription bottle indicates you do not have any refills left, you may request refills at any time through one of the following ways: The online Complexa system (except Urgent Care), by calling your provider’s office, or by asking your pharmacy to contact your provider’s office with a refill request. Medication refills are processed only during regular business hours and may not be available until the next business day. Your provider may request additional information or to have a follow-up visit with you prior to refilling your medication.   *Please Note: Medication refills are assigned a new Rx number when refilled electronically. Your pharmacy may indicate that no refills were authorized even though a new prescription for the same medication is available at the pharmacy. Please request the medicine by name with the pharmacy before contacting your provider for a refill.           Complexa Access Code: Activation code not generated  Current Complexa Status: Active

## 2017-06-06 NOTE — LETTER
"  FORM C-4:  EMPLOYEE’S CLAIM FOR COMPENSATION/ REPORT OF INITIAL TREATMENT  EMPLOYEE’S CLAIM - PROVIDE ALL INFORMATION REQUESTED   First Name  Belgica Last Name  Arsenio Birthdate             Age  1990 26 y.o. Sex  female Claim Number   Home Employee Address  [address]                                     Zip  [address] Height  1.626 m (5' 4\") Weight  60.782 kg (134 lb) [facility]  [id number]   Mailing Employee Address                           [address]               Zip  [address] Telephone  [phone] (home)  Primary Language Spoken  ENGLISH   Insurer  *** Third Party   WORKERS CHOICE Employee's Occupation (Job Title) When Injury or Occupational Disease Occurred  [employer]   Employer's Name  [employer] Telephone  [phone]    Employer Address  [address] Zip  [address]   Date of Injury  6/6/2017       Hour of Injury  8:15 PM Date Employer Notified  6/6/2017 Last Day of Work after Injury or Occupational Disease  6/6/2017 Supervisor to Whom Injury Reported  Kasey Yarbrough   Address or Location of Accident (if applicable)  [[facility] [facility]]   What were you doing at the time of accident? (if applicable)  Cleaning Instrumentation    How did this injury or occupational disease occur? Be specific and answer in detail. Use additional sheet if necessary)  I was reaching for the scissors in the dirty tray and upon doing so was poked by the tip of an Iris scissor.   If you believe that you have an occupational disease, when did you first have knowledge of the disability and it relationship to your employment?  N/A Witnesses to the Accident  Marc Davis     Nature of Injury or Occupational Disease  Workers' Compensation  Part(s) of Body Injured or Affected  Finger (R), N/A, N/A    I certify that the above is true and correct to the best of my knowledge and that I have " provided this information in order to obtain the benefits of [place]  I hereby authorize any physician, chiropractor, surgeon, practitioner, or other person, any hospital, including [facility] or [facility] hospital, any medical service organization, any insurance company, or other institution or organization to release to each other, any medical or other information, including benefits paid or payable, pertinent to this injury or disease, except information relative to diagnosis, treatment and/or counseling for AIDS, psychological conditions, alcohol or controlled substances, for which I must give specific authorization.  A Photostat of this authorization shall be as valid as the original.   Date Place   Employee’s Signature   THIS REPORT MUST BE COMPLETED AND MAILED WITHIN 3 WORKING DAYS OF TREATMENT   Place  [facility], EMERGENCY DEPT  Name of Facility   [facility]   Date  6/6/2017 Diagnosis  (Z57.8) Employee exposure to body fluids Is there evidence the injured employee was under the influence of alcohol and/or another controlled substance at the time of accident?   Hour  11:55 PM Description of Injury or Disease  Employee exposure to body fluids     Treatment     Have you advised the patient to remain off work five days or more?             X-Ray Findings      If Yes   From Date    To Date      From information given by the employee, together with medical evidence, can you directly connect this injury or occupational disease as job incurred?    If No, is the employee capable of: Full Duty    Modified Duty      Is additional medical care by a physician indicated?    If Modified Duty, Specify any Limitations / Restrictions        Do you know of any previous injury or disease contributing to this condition or occupational disease?      Date  6/6/2017 Print  "Doctor’s Name  Annie Mistry I certify the employer’s copy of this form was mailed on:   Address  [address] [address] [phone] Insurer’s Use Only   [facility]  [id number]    Provider’s Tax ID Number    Telephone  Dept: [phone]    Doctor’s Signature    Degree       Original - TREATING PHYSICIAN OR CHIROPRACTOR   Pg 2-Insurer/TPA   Pg 3-Employer   Pg 4-Employee                                                                                                  Form C-4 (rev01/03)     BRIEF DESCRIPTION OF RIGHTS AND BENEFITS  (Pursuant to [place] 616[place])    Notice of Injury or Occupational Disease (Incident Report Form C-1): If an injury or occupational disease (OD) arises out of and in the course of employment, you must provide written notice to your employer as soon as practicable, but no later than 7 days after the accident or OD. Your employer shall maintain a sufficient supply of the required forms.    Claim for Compensation (Form C-4): If medical treatment is sought, the form C-4 is available at the place of initial treatment. A completed \"Claim for Compensation\" (Form C-4) must be filed within 90 days after an accident or OD. The treating physician or chiropractor must, within 3 working days after treatment, complete and mail to the employer, the employer's insurer and third-party , the Claim for Compensation.    Medical Treatment: If you require medical treatment for your on-the-job injury or OD, you may be required to select a physician or chiropractor from a list provided by your workers’ compensation insurer, if it has contracted with an Organization for Managed Care (MCO) or Preferred Provider Organization (PPO) or providers of health care. If your employer has not entered into a contract with an MCO or PPO, you may select a physician or chiropractor from the Panel of Physicians and Chiropractors. Any medical costs related to your industrial injury or OD " will be paid by your insurer.    Temporary Total Disability (TTD): If your doctor has certified that you are unable to work for a period of at least 5 consecutive days, or 5 cumulative days in a 20-day period, or places restrictions on you that your employer does not accommodate, you may be entitled to TTD compensation.    Temporary Partial Disability (TPD): If the wage you receive upon reemployment is less than the compensation for TTD to which you are entitled, the insurer may be required to pay you TPD compensation to make up the difference. TPD can only be paid for a maximum of 24 months.    Permanent Partial Disability (PPD): When your medical condition is stable and there is an indication of a PPD as a result of your injury or OD, within 30 days, your insurer must arrange for an evaluation by a rating physician or chiropractor to determine the degree of your PPD. The amount of your PPD award depends on the date of injury, the results of the PPD evaluation and your age and wage.    Permanent Total Disability (PTD): If you are medically certified by a treating physician or chiropractor as permanently and totally disabled and have been granted a PTD status by your insurer, you are entitled to receive monthly benefits not to exceed 66 2/3% of your average monthly wage. The amount of your PTD payments is subject to reduction if you previously received a PPD award.    Vocational Rehabilitation Services: You may be eligible for vocational rehabilitation services if you are unable to return to the job due to a permanent physical impairment or permanent restrictions as a result of your injury or occupational disease.    Transportation and Per Srinivasan Reimbursement: You may be eligible for travel expenses and per srinivasan associated with medical treatment.  Reopening: You may be able to reopen your claim if your condition worsens after claim closure.    Appeal Process: If you disagree with a written determination issued by the  insurer or the insurer does not respond to your request, you may appeal to the Department of Administration, , by following the instructions contained in your determination letter. You must appeal the determination within 70 days from the date of the determination letter at [address], [address], [place], [place] [address], or [address], [address], [place], [place] [address]. If you disagree with the  decision, you may appeal to the Department of Administration, . You must file your appeal within 30 days from the date of the  decision letter at [address], [address], [place], [place] [address], or [address], [address], [place], [place] [address]. If you disagree with a decision of an , you may file a petition for judicial review with the District Court. You must do so within 30 days of the Appeal Officer’s decision. You may be represented by an  at your own expense or you may contact the [facility] for possible representation.    [place]  for Injured Workers (NAIW): If you disagree with a  decision, you may request that NAIW represent you without charge at an  Hearing. For information regarding denial of benefits, you may contact the [facility] at: [address] [place] [address], [address], [place] [address], [phone], or [address], [address], [place] [address], [phone]    To File a Complaint with the Division: If you wish to file a complaint with the  of the Division of Industrial Relations (DIR), please contact the Workers’ Compensation Section, [address], [address], [place], [place] [address], telephone [phone]) [phone], or [address], [place] [address], telephone [phone].    For assistance with Workers’ Compensation Issues: you may contact the Office of the Governor Consumer Health  Assistance, [address], [address], [place], [place] [address], Toll Free 1-387.271.2830, Web site: http://[employer].[facility].[place]., E-mail [email or web address].                                                                                                                                                                               __________________________________________________________________                                    _________________            Employee Name / Signature                                                                                                                            Date                                       D-2 (rev. 10/07)

## 2017-06-06 NOTE — LETTER
"  FORM C-4:  EMPLOYEE’S CLAIM FOR COMPENSATION/ REPORT OF INITIAL TREATMENT  EMPLOYEE’S CLAIM - PROVIDE ALL INFORMATION REQUESTED   First Name  Belgica Last Name  Arsenio Birthdate             Age  1990 26 y.o. 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 (if applicable)  Cleaning Instrumentation    How did this injury or occupational disease occur? Be specific and answer in detail. Use additional sheet if necessary)  I was reaching for the scissors in the dirty tray and upon doing so was poked by the tip of an Iris scissor.   If you believe that you have an occupational disease, when did you first have knowledge of the disability and it relationship to your employment?  N/A Witnesses to the Accident  Marc Davis     Nature of Injury or Occupational Disease  Workers' Compensation  Part(s) of Body Injured or Affected  Finger (R), N/A, N/A    I certify that the above is true and correct to the best of my knowledge and that I have " provided this information in order to obtain the benefits of [place] inclusive or Chapter 617 of [place]).  I hereby authorize any physician, chiropractor, surgeon, practitioner, or other person, any hospital, including [facility] hospital, any medical service organization, any insurance company, or other institution or organization to release to each other, any medical or other information, including benefits paid or payable, pertinent to this injury or disease, except information relative to diagnosis, treatment and/or counseling for AIDS, psychological conditions, alcohol or controlled substances, for which I must give specific authorization.  A Photostat of this authorization shall be as valid as the original.   Date Place   Employee’s Signature   THIS REPORT MUST BE COMPLETED AND MAILED WITHIN 3 WORKING DAYS OF TREATMENT   Place  [facility], EMERGENCY DEPT  Name of Facility   [facility]   Date  6/6/2017 Diagnosis  No diagnosis found. Is there evidence the injured employee was under the influence of alcohol and/or another controlled substance at the time of accident?   Hour  10:03 PM Description of Injury or Disease       Treatment     Have you advised the patient to remain off work five days or more?             X-Ray Findings      If Yes   From Date    To Date      From information given by the employee, together with medical evidence, can you directly connect this injury or occupational disease as job incurred?    If No, is the employee capable of: Full Duty    Modified Duty      Is additional medical care by a physician indicated?    If Modified Duty, Specify any Limitations / Restrictions        Do you know of any previous injury or disease contributing to this condition or occupational disease?      Date  6/6/2017 Print Doctor’s Name  Annie Mistry I certify the employer’s  "copy of this form was mailed on:   Address  [address]  [place] [id number] [phone] Insurer’s Use Only   [facility]  [id number]    Provider’s Tax ID Number    Telephone  Dept: [phone]    Doctor’s Signature    Degree       Original - TREATING PHYSICIAN OR CHIROPRACTOR   Pg 2-Insurer/TPA   Pg 3-Employer   Pg 4-Employee                                                                                                  Form C-4 (rev01/03)     BRIEF DESCRIPTION OF RIGHTS AND BENEFITS  (Pursuant to [place]RS 616C.050)    Notice of Injury or Occupational Disease (Incident Report Form C-1): If an injury or occupational disease (OD) arises out of and in the course of employment, you must provide written notice to your employer as soon as practicable, but no later than 7 days after the accident or OD. Your employer shall maintain a sufficient supply of the required forms.    Claim for Compensation (Form C-4): If medical treatment is sought, the form C-4 is available at the place of initial treatment. A completed \"Claim for Compensation\" (Form C-4) must be filed within 90 days after an accident or OD. The treating physician or chiropractor must, within 3 working days after treatment, complete and mail to the employer, the employer's insurer and third-party , the Claim for Compensation.    Medical Treatment: If you require medical treatment for your on-the-job injury or OD, you may be required to select a physician or chiropractor from a list provided by your workers’ compensation insurer, if it has contracted with an Organization for Managed Care (MCO) or Preferred Provider Organization (PPO) or providers of health care. If your employer has not entered into a contract with an MCO or PPO, you may select a physician or chiropractor from the Panel of Physicians and Chiropractors. Any medical costs related to your industrial injury or OD will be paid by your insurer.    Temporary Total " Disability (TTD): If your doctor has certified that you are unable to work for a period of at least 5 consecutive days, or 5 cumulative days in a 20-day period, or places restrictions on you that your employer does not accommodate, you may be entitled to TTD compensation.    Temporary Partial Disability (TPD): If the wage you receive upon reemployment is less than the compensation for TTD to which you are entitled, the insurer may be required to pay you TPD compensation to make up the difference. TPD can only be paid for a maximum of 24 months.    Permanent Partial Disability (PPD): When your medical condition is stable and there is an indication of a PPD as a result of your injury or OD, within 30 days, your insurer must arrange for an evaluation by a rating physician or chiropractor to determine the degree of your PPD. The amount of your PPD award depends on the date of injury, the results of the PPD evaluation and your age and wage.    Permanent Total Disability (PTD): If you are medically certified by a treating physician or chiropractor as permanently and totally disabled and have been granted a PTD status by your insurer, you are entitled to receive monthly benefits not to exceed 66 2/3% of your average monthly wage. The amount of your PTD payments is subject to reduction if you previously received a PPD award.    Vocational Rehabilitation Services: You may be eligible for vocational rehabilitation services if you are unable to return to the job due to a permanent physical impairment or permanent restrictions as a result of your injury or occupational disease.    Transportation and Per Srinivasan Reimbursement: You may be eligible for travel expenses and per srinivasan associated with medical treatment.  Reopening: You may be able to reopen your claim if your condition worsens after claim closure.    Appeal Process: If you disagree with a written determination issued by the insurer or the insurer does not respond to your  request, you may appeal to the Department of Administration, , by following the instructions contained in your determination letter. You must appeal the determination within 70 days from the date of the determination letter at [address], [address], [place], [place] [address], or [address], [address], [place], [place] [address]. If you disagree with the  decision, you may appeal to the Department of Administration, . You must file your appeal within 30 days from the date of the  decision letter at [address], [address], [place], [place] [address], or [address], [address], [place], [place] [address]. If you disagree with a decision of an , you may file a petition for judicial review with the District Court. You must do so within 30 days of the Appeal Officer’s decision. You may be represented by an  at your own expense or you may contact the [facility] for possible representation.    [place]  for Injured Workers (NAIW): If you disagree with a  decision, you may request that NAIW represent you without charge at an  Hearing. For information regarding denial of benefits, you may contact the [facility] at: [address], [address], [place] [address], [phone], or [address], [address], [place] [address], [phone]    To File a Complaint with the Division: If you wish to file a complaint with the  of the Division of Industrial Relations (DIR), please contact the Workers’ Compensation Section, [address], [address], [place], [place] [address], telephone [phone], or [address], [place] [address], telephone [phone].    For assistance with Workers’ Compensation Issues: you may contact the Office of the Governor Consumer Health Assistance, [address], [address], [place], [place] [address], Toll Free 1-968.451.3755, Web site: http://govcha.[facility].nv., E-mail [email or web address].                                                                                                                                                                               __________________________________________________________________                                    _________________            Employee Name / Signature                                                                                                                            Date                                       D-2 (rev. 10/07)
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
## 2017-06-06 NOTE — ED AVS SNAPSHOT
6/7/2017    Belgica Arsenio  [address]  [place] [address]    Dear Belgica:    [facility] wants to ensure your discharge home is safe and you or your loved ones have had all of your questions answered regarding your care after you leave the hospital.    Below is a list of resources and contact information should you have any questions regarding your hospital stay, follow-up instructions, or active medical symptoms.    Questions or Concerns Regarding… Contact   Medical Questions Related to Your Discharge  (7 days a week, 8am-5pm) Contact a Nurse Care Coordinator   [phone]   Medical Questions Not Related to Your Discharge  (24 hours a day / 7 days a week)  Contact the Nurse Health Line   [phone]    Medications or Discharge Instructions Refer to your discharge packet   or contact your [facility] Primary Care Provider   [phone]   Follow-up Appointment(s) Schedule your appointment via [employer]   or contact Scheduling [phone]   Billing Review your statement via [employer]  or contact Billing [phone]   Medical Records Review your records via [employer]   or contact Medical Records [phone]     You may receive a telephone call within two days of discharge. This call is to make certain you understand your discharge instructions and have the opportunity to have any questions answered. You can also easily access your medical information, test results and upcoming appointments via the [employer] free online health management tool. You can learn more and sign up at [employer]. For assistance setting up your [employer] account, please call [phone].    Once again, we want to ensure your discharge home is safe and that you have a clear understanding of any next steps in your care. If you have any questions or concerns, please do not hesitate to contact us, we are here for you. Thank you for choosing [facility] for your healthcare needs.    Sincerely,    Your [facility] Healthcare Team
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
## 2017-06-06 NOTE — ED AVS SNAPSHOT
Tao Sales Access Code: Activation code not generated  Current Tao Sales Status: Active    [email or web address]  A secure, online tool to manage your health information     [employer]’s Tao Sales® is a secure, online tool that connects you to your personalized health information from the privacy of your home -- day or night - making it very easy for you to manage your healthcare. Once the activation process is completed, you can even access your medical information using the Tao Sales chapincito, which is available for free in the Apple Chapincito store or Google Play store.     Tao Sales provides the following levels of access (as shown below):   My Chart Features   [facility] Primary Care Doctor [facility]  Specialists [facility]  Urgent  Care Non-[facility]  Primary Care  Doctor   Email your healthcare team securely and privately 24/7 X X X X   Manage appointments: schedule your next appointment; view details of past/upcoming appointments X      Request prescription refills. X      View recent personal medical records, including lab and immunizations X X X X   View health record, including health history, allergies, medications X X X X   Read reports about your outpatient visits, procedures, consult and ER notes X X X X   See your discharge summary, which is a recap of your hospital and/or ER visit that includes your diagnosis, lab results, and care plan. X X       How to register for Tao Sales:  1. Go to  [email or web address].  2. Click on the Sign Up Now box, which takes you to the New Member Sign Up page. You will need to provide the following information:  a. Enter your Tao Sales Access Code exactly as it appears at the top of this page. (You will not need to use this code after you’ve completed the sign-up process. If you do not sign up before the expiration date, you must request a new code.)   b. Enter your date of birth.   c. Enter your home email address.   d. Click Submit, and follow the next screen’s instructions.  3. Create a Tao Sales ID. This will  be your WirelessGate login ID and cannot be changed, so think of one that is secure and easy to remember.  4. Create a WirelessGate password. You can change your password at any time.  5. Enter your Password Reset Question and Answer. This can be used at a later time if you forget your password.   6. Enter your e-mail address. This allows you to receive e-mail notifications when new information is available in WirelessGate.  7. Click Sign Up. You can now view your health information.    For assistance activating your WirelessGate account, call [phone]

## 2017-06-06 NOTE — LETTER
"  FORM C-4:  EMPLOYEE’S CLAIM FOR COMPENSATION/ REPORT OF INITIAL TREATMENT  EMPLOYEE’S CLAIM - PROVIDE ALL INFORMATION REQUESTED   First Name  Belgica Last Name  Arsenio Birthdate             Age  1990 26 y.o. Sex  female Claim Number   Home Employee Address  [address]                                     [place]  [address] Height  1.626 m (5' 4\") Weight  60.782 kg (134 lb) [facility]  [id number]   Mailing Employee Address                           [address]               Zip  [address] Telephone  [phone] (home)  Primary Language Spoken  ENGLISH   Insurer  *** Third Party   WORKERS CHOICE Employee's Occupation (Job Title) When Injury or Occupational Disease Occurred  [employer]   Employer's Name  [employer] Telephone  [phone]    Employer Address  [address] Zip  [address]   Date of Injury  6/6/2017       Hour of Injury  8:15 PM Date Employer Notified  6/6/2017 Last Day of Work after Injury or Occupational Disease  6/6/2017 Supervisor to Whom Injury Reported  Kasey Yarbrough   Address or Location of Accident (if applicable)  [[facility] [facility]]   What were you doing at the time of accident? (if applicable)  Cleaning Instrumentation    How did this injury or occupational disease occur? Be specific and answer in detail. Use additional sheet if necessary)  I was reaching for the scissors in the dirty tray and upon doing so was poked by the tip of an Iris scissor.   If you believe that you have an occupational disease, when did you first have knowledge of the disability and it relationship to your employment?  N/A Witnesses to the Accident  Marc Davis     Nature of Injury or Occupational Disease  Workers' Compensation  Part(s) of Body Injured or Affected  Finger (R), N/A, N/A    I certify that the above is true and correct to the best of my knowledge and that I have " provided this information in order to obtain the benefits of [place]  I hereby authorize any physician, chiropractor, surgeon, practitioner, or other person, any hospital, including [facility] or [facility] hospital, any medical service organization, any insurance company, or other institution or organization to release to each other, any medical or other information, including benefits paid or payable, pertinent to this injury or disease, except information relative to diagnosis, treatment and/or counseling for AIDS, psychological conditions, alcohol or controlled substances, for which I must give specific authorization.  A Photostat of this authorization shall be as valid as the original.   Date Place   Employee’s Signature   THIS REPORT MUST BE COMPLETED AND MAILED WITHIN 3 WORKING DAYS OF TREATMENT   Place  [facility], EMERGENCY DEPT  Name of Facility   [facility]   Date  6/6/2017 Diagnosis  No diagnosis found. Is there evidence the injured employee was under the influence of alcohol and/or another controlled substance at the time of accident?   Hour  10:28 PM Description of Injury or Disease       Treatment     Have you advised the patient to remain off work five days or more?             X-Ray Findings      If Yes   From Date    To Date      From information given by the employee, together with medical evidence, can you directly connect this injury or occupational disease as job incurred?    If No, is the employee capable of: Full Duty    Modified Duty      Is additional medical care by a physician indicated?    If Modified Duty, Specify any Limitations / Restrictions        Do you know of any previous injury or disease contributing to this condition or occupational disease?      Date  6/6/2017 Print Doctor’s Name  Annie Mistry I certify the employer’s  "copy of this form was mailed on:   Address  [address]  [place] [id number] [phone] Insurer’s Use Only   [facility]  [address]    Provider’s Tax ID Number    Telephone  Dept: [phone]    Doctor’s Signature    Degree       Original - TREATING PHYSICIAN OR CHIROPRACTOR   Pg 2-Insurer/TPA   Pg 3-Employer   Pg 4-Employee                                                                                                  Form C-4 (rev01/03)     BRIEF DESCRIPTION OF RIGHTS AND BENEFITS  (Pursuant to [place] 616[place])    Notice of Injury or Occupational Disease (Incident Report Form C-1): If an injury or occupational disease (OD) arises out of and in the course of employment, you must provide written notice to your employer as soon as practicable, but no later than 7 days after the accident or OD. Your employer shall maintain a sufficient supply of the required forms.    Claim for Compensation (Form C-4): If medical treatment is sought, the form C-4 is available at the place of initial treatment. A completed \"Claim for Compensation\" (Form C-4) must be filed within 90 days after an accident or OD. The treating physician or chiropractor must, within 3 working days after treatment, complete and mail to the employer, the employer's insurer and third-party , the Claim for Compensation.    Medical Treatment: If you require medical treatment for your on-the-job injury or OD, you may be required to select a physician or chiropractor from a list provided by your workers’ compensation insurer, if it has contracted with an Organization for Managed Care (MCO) or Preferred Provider Organization (PPO) or providers of health care. If your employer has not entered into a contract with an MCO or PPO, you may select a physician or chiropractor from the Panel of Physicians and Chiropractors. Any medical costs related to your industrial injury or OD will be paid by your insurer.    Temporary Total " Disability (TTD): If your doctor has certified that you are unable to work for a period of at least 5 consecutive days, or 5 cumulative days in a 20-day period, or places restrictions on you that your employer does not accommodate, you may be entitled to TTD compensation.    Temporary Partial Disability (TPD): If the wage you receive upon reemployment is less than the compensation for TTD to which you are entitled, the insurer may be required to pay you TPD compensation to make up the difference. TPD can only be paid for a maximum of 24 months.    Permanent Partial Disability (PPD): When your medical condition is stable and there is an indication of a PPD as a result of your injury or OD, within 30 days, your insurer must arrange for an evaluation by a rating physician or chiropractor to determine the degree of your PPD. The amount of your PPD award depends on the date of injury, the results of the PPD evaluation and your age and wage.    Permanent Total Disability (PTD): If you are medically certified by a treating physician or chiropractor as permanently and totally disabled and have been granted a PTD status by your insurer, you are entitled to receive monthly benefits not to exceed 66 2/3% of your average monthly wage. The amount of your PTD payments is subject to reduction if you previously received a PPD award.    Vocational Rehabilitation Services: You may be eligible for vocational rehabilitation services if you are unable to return to the job due to a permanent physical impairment or permanent restrictions as a result of your injury or occupational disease.    Transportation and Per Srinivasan Reimbursement: You may be eligible for travel expenses and per srinivasan associated with medical treatment.  Reopening: You may be able to reopen your claim if your condition worsens after claim closure.    Appeal Process: If you disagree with a written determination issued by the insurer or the insurer does not respond to your  request, you may appeal to the Department of Administration, , by following the instructions contained in your determination letter. You must appeal the determination within 70 days from the date of the determination letter at [address], [address], [place], [place] [address], or [address], [address], [place], [place] [address]. If you disagree with the  decision, you may appeal to the Department of Administration, . You must file your appeal within 30 days from the date of the  decision letter at [address], [address], [place], [place] [address], or [address], [address], [place], [place] [address]. If you disagree with a decision of an , you may file a petition for judicial review with the District Court. You must do so within 30 days of the Appeal Officer’s decision. You may be represented by an  at your own expense or you may contact the [facility] for possible representation.    [place]  for Injured Workers (NAIW): If you disagree with a  decision, you may request that NAIW represent you without charge at an  Hearing. For information regarding denial of benefits, you may contact the [facility] at: [address], [address], [place] [address], [phone], or [address], [place] [address], [phone]    To File a Complaint with the Division: If you wish to file a complaint with the  of the Division of Industrial Relations (DIR), please contact the Workers’ Compensation Section, [address], [address], [place], [place] [address], telephone ([phone]) [phone], or [address], [place] [address], telephone [phone].    For assistance with Workers’ Compensation Issues: you may contact the Office of the Governor Consumer Health Assistance, [address]", [address], [place], [place] [address], Toll Free 1-623.208.4972, Web site: http://govcha.[facility].nv., E-mail [email or web address].                                                                                                                                                                               __________________________________________________________________                                    _________________            Employee Name / Signature                                                                                                                            Date                                       D-2 (rev. 10/07)

## 2017-06-06 NOTE — LETTER
"  FORM C-4:  EMPLOYEE’S CLAIM FOR COMPENSATION/ REPORT OF INITIAL TREATMENT  EMPLOYEE’S CLAIM - PROVIDE ALL INFORMATION REQUESTED   First Name  Belgica Last Name  Arsenio Birthdate             Age  1990 26 y.o. 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 (if applicable)  Cleaning Instrumentation    How did this injury or occupational disease occur? Be specific and answer in detail. Use additional sheet if necessary)  I was reaching for the scissors in the dirty tray and upon doing so was poked by the tip of an Iris scissor.   If you believe that you have an occupational disease, when did you first have knowledge of the disability and it relationship to your employment?  N/A Witnesses to the Accident  Marc Davis     Nature of Injury or Occupational Disease  Workers' Compensation  Part(s) of Body Injured or Affected  Finger (R), N/A, N/A    I certify that the above is true and correct to the best of my knowledge and that I have " provided this information in order to obtain the benefits of [place] or Chapter 617 of [place]  I hereby authorize any physician, chiropractor, surgeon, practitioner, or other person, any hospital, including [facility] or [facility] hospital, any medical service organization, any insurance company, or other institution or organization to release to each other, any medical or other information, including benefits paid or payable, pertinent to this injury or disease, except information relative to diagnosis, treatment and/or counseling for AIDS, psychological conditions, alcohol or controlled substances, for which I must give specific authorization.  A Photostat of this authorization shall be as valid as the original.   Date Place   Employee’s Signature   THIS REPORT MUST BE COMPLETED AND MAILED WITHIN 3 WORKING DAYS OF TREATMENT   Place  [facility], EMERGENCY DEPT  Name of Facility   [facility]   Date  6/6/2017 Diagnosis  (Z57.8) Employee exposure to body fluids Is there evidence the injured employee was under the influence of alcohol and/or another controlled substance at the time of accident?   Hour  11:56 PM Description of Injury or Disease  Employee exposure to body fluids     Treatment     Have you advised the patient to remain off work five days or more?             X-Ray Findings      If Yes   From Date    To Date      From information given by the employee, together with medical evidence, can you directly connect this injury or occupational disease as job incurred?    If No, is the employee capable of: Full Duty    Modified Duty      Is additional medical care by a physician indicated?    If Modified Duty, Specify any Limitations / Restrictions        Do you know of any previous injury or disease contributing to this condition or occupational disease?      Date  6/6/2017 Print  "Doctor’s Name  Annie Mistry I certify the employer’s copy of this form was mailed on:   Address  [address] [address] [phone] Insurer’s Use Only   [facility]  [id number]    Provider’s Tax ID Number    Telephone  Dept: [phone]    Doctor’s Signature    Degree       Original - TREATING PHYSICIAN OR CHIROPRACTOR   Pg 2-Insurer/TPA   Pg 3-Employer   Pg 4-Employee                                                                                                  Form C-4 (rev01/03)     BRIEF DESCRIPTION OF RIGHTS AND BENEFITS  (Pursuant to [place]RS 616C.050)    Notice of Injury or Occupational Disease (Incident Report Form C-1): If an injury or occupational disease (OD) arises out of and in the course of employment, you must provide written notice to your employer as soon as practicable, but no later than 7 days after the accident or OD. Your employer shall maintain a sufficient supply of the required forms.    Claim for Compensation (Form C-4): If medical treatment is sought, the form C-4 is available at the place of initial treatment. A completed \"Claim for Compensation\" (Form C-4) must be filed within 90 days after an accident or OD. The treating physician or chiropractor must, within 3 working days after treatment, complete and mail to the employer, the employer's insurer and third-party , the Claim for Compensation.    Medical Treatment: If you require medical treatment for your on-the-job injury or OD, you may be required to select a physician or chiropractor from a list provided by your workers’ compensation insurer, if it has contracted with an Organization for Managed Care (MCO) or Preferred Provider Organization (PPO) or providers of health care. If your employer has not entered into a contract with an MCO or PPO, you may select a physician or chiropractor from the Panel of Physicians and Chiropractors. Any medical costs related to your industrial injury or OD " will be paid by your insurer.    Temporary Total Disability (TTD): If your doctor has certified that you are unable to work for a period of at least 5 consecutive days, or 5 cumulative days in a 20-day period, or places restrictions on you that your employer does not accommodate, you may be entitled to TTD compensation.    Temporary Partial Disability (TPD): If the wage you receive upon reemployment is less than the compensation for TTD to which you are entitled, the insurer may be required to pay you TPD compensation to make up the difference. TPD can only be paid for a maximum of 24 months.    Permanent Partial Disability (PPD): When your medical condition is stable and there is an indication of a PPD as a result of your injury or OD, within 30 days, your insurer must arrange for an evaluation by a rating physician or chiropractor to determine the degree of your PPD. The amount of your PPD award depends on the date of injury, the results of the PPD evaluation and your age and wage.    Permanent Total Disability (PTD): If you are medically certified by a treating physician or chiropractor as permanently and totally disabled and have been granted a PTD status by your insurer, you are entitled to receive monthly benefits not to exceed 66 2/3% of your average monthly wage. The amount of your PTD payments is subject to reduction if you previously received a PPD award.    Vocational Rehabilitation Services: You may be eligible for vocational rehabilitation services if you are unable to return to the job due to a permanent physical impairment or permanent restrictions as a result of your injury or occupational disease.    Transportation and Per Srinivasan Reimbursement: You may be eligible for travel expenses and per srinivasan associated with medical treatment.  Reopening: You may be able to reopen your claim if your condition worsens after claim closure.    Appeal Process: If you disagree with a written determination issued by the  insurer or the insurer does not respond to your request, you may appeal to the Department of Administration, , by following the instructions contained in your determination letter. You must appeal the determination within 70 days from the date of the determination letter at [address], [address], [place], [place] [address], or [address], [address], [place], [place] [address]. If you disagree with the  decision, you may appeal to the Department of Administration, . You must file your appeal within 30 days from the date of the  decision letter at [address], [address], [place], [place] [address], or [address], [place], [place] [address]. If you disagree with a decision of an , you may file a petition for judicial review with the District Court. You must do so within 30 days of the Appeal Officer’s decision. You may be represented by an  at your own expense or you may contact the [facility] for possible representation.    [place]  for Injured Workers (NAIW): If you disagree with a  decision, you may request that NAIW represent you without charge at an  Hearing. For information regarding denial of benefits, you may contact the [facility] at: [address], [address], [place] [address], [phone], or [address], [place] [address], [phone]    To File a Complaint with the Division: If you wish to file a complaint with the  of the Division of Industrial Relations (DIR), please contact the Workers’ Compensation Section, [address], [place], [place] [address], telephone (721) [phone], or [address], [place] [address], telephone [phone].    For assistance with Workers’ Compensation Issues: you may contact the Office of the Governor Consumer Health  Assistance, [address], [address], [place], [place] [address], Toll Free 1-846.520.7933, Web site: [email or web address]., E-mail [email or web address].                                                                                                                                                                               __________________________________________________________________                                    _________________            Employee Name / Signature                                                                                                                            Date                                       D-2 (rev. 10/07)

## 2017-06-06 NOTE — LETTER
"  FORM C-4:  EMPLOYEE’S CLAIM FOR COMPENSATION/ REPORT OF INITIAL TREATMENT  EMPLOYEE’S CLAIM - PROVIDE ALL INFORMATION REQUESTED   First Name  Belgica Last Name  Arsenio Birthdate             Age  1990 26 y.o. 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 (if applicable)  Cleaning Instrumentation    How did this injury or occupational disease occur? Be specific and answer in detail. Use additional sheet if necessary)  I was reaching for the scissors in the dirty tray and upon doing so was poked by the tip of an Iris scissor.   If you believe that you have an occupational disease, when did you first have knowledge of the disability and it relationship to your employment?  N/A Witnesses to the Accident  Marc Davis     Nature of Injury or Occupational Disease  Workers' Compensation  Part(s) of Body Injured or Affected  Finger (R), N/A, N/A    I certify that the above is true and correct to the best of my knowledge and that I have " provided this information in order to obtain the benefits of [place]’s [place] and [place] 616[place], inclusive or Chapter 617 of [place]RS).  I hereby authorize any physician, chiropractor, surgeon, practitioner, or other person, any hospital, including [facility] hospital, any medical service organization, any insurance company, or other institution or organization to release to each other, any medical or other information, including benefits paid or payable, pertinent to this injury or disease, except information relative to diagnosis, treatment and/or counseling for AIDS, psychological conditions, alcohol or controlled substances, for which I must give specific authorization.  A Photostat of this authorization shall be as valid as the original.   Date Place   Employee’s Signature   THIS REPORT MUST BE COMPLETED AND MAILED WITHIN 3 WORKING DAYS OF TREATMENT   Place  [facility], EMERGENCY DEPT  Name of Facility   [facility]   Date  6/6/2017 Diagnosis  No diagnosis found. Is there evidence the injured employee was under the influence of alcohol and/or another controlled substance at the time of accident?   Hour  11:06 PM Description of Injury or Disease       Treatment     Have you advised the patient to remain off work five days or more?             X-Ray Findings      If Yes   From Date    To Date      From information given by the employee, together with medical evidence, can you directly connect this injury or occupational disease as job incurred?    If No, is the employee capable of: Full Duty    Modified Duty      Is additional medical care by a physician indicated?    If Modified Duty, Specify any Limitations / Restrictions        Do you know of any previous injury or disease contributing to this condition or occupational disease?      Date  6/6/2017 Print Doctor’s Name  Annie Mistry I certify the employer’s  "copy of this form was mailed on:   Address  [address]  [place] [id number] [phone] Insurer’s Use Only   [facility]  [id number]    Provider’s Tax ID Number    Telephone  Dept: [phone]    Doctor’s Signature    Degree       Original - TREATING PHYSICIAN OR CHIROPRACTOR   Pg 2-Insurer/TPA   Pg 3-Employer   Pg 4-Employee                                                                                                  Form C-4 (rev01/03)     BRIEF DESCRIPTION OF RIGHTS AND BENEFITS  ([place]ursuant to [place]RS 616C.050)    Notice of Injury or Occupational Disease (Incident Report Form C-1): If an injury or occupational disease (OD) arises out of and in the course of employment, you must provide written notice to your employer as soon as practicable, but no later than 7 days after the accident or OD. Your employer shall maintain a sufficient supply of the required forms.    Claim for Compensation (Form C-4): If medical treatment is sought, the form C-4 is available at the place of initial treatment. A completed \"Claim for Compensation\" (Form C-4) must be filed within 90 days after an accident or OD. The treating physician or chiropractor must, within 3 working days after treatment, complete and mail to the employer, the employer's insurer and third-party , the Claim for Compensation.    Medical Treatment: If you require medical treatment for your on-the-job injury or OD, you may be required to select a physician or chiropractor from a list provided by your workers’ compensation insurer, if it has contracted with an Organization for Managed Care (MCO) or Preferred Provider Organization (PPO) or providers of health care. If your employer has not entered into a contract with an MCO or PPO, you may select a physician or chiropractor from the Panel of Physicians and Chiropractors. Any medical costs related to your industrial injury or OD will be paid by your insurer.    Temporary Total " Disability (TTD): If your doctor has certified that you are unable to work for a period of at least 5 consecutive days, or 5 cumulative days in a 20-day period, or places restrictions on you that your employer does not accommodate, you may be entitled to TTD compensation.    Temporary Partial Disability (TPD): If the wage you receive upon reemployment is less than the compensation for TTD to which you are entitled, the insurer may be required to pay you TPD compensation to make up the difference. TPD can only be paid for a maximum of 24 months.    Permanent Partial Disability (PPD): When your medical condition is stable and there is an indication of a PPD as a result of your injury or OD, within 30 days, your insurer must arrange for an evaluation by a rating physician or chiropractor to determine the degree of your PPD. The amount of your PPD award depends on the date of injury, the results of the PPD evaluation and your age and wage.    Permanent Total Disability (PTD): If you are medically certified by a treating physician or chiropractor as permanently and totally disabled and have been granted a PTD status by your insurer, you are entitled to receive monthly benefits not to exceed 66 2/3% of your average monthly wage. The amount of your PTD payments is subject to reduction if you previously received a PPD award.    Vocational Rehabilitation Services: You may be eligible for vocational rehabilitation services if you are unable to return to the job due to a permanent physical impairment or permanent restrictions as a result of your injury or occupational disease.    Transportation and Per Srinivasan Reimbursement: You may be eligible for travel expenses and per srinivasan associated with medical treatment.  Reopening: You may be able to reopen your claim if your condition worsens after claim closure.    Appeal Process: If you disagree with a written determination issued by the insurer or the insurer does not respond to your  request, you may appeal to the Department of Administration, , by following the instructions contained in your determination letter. You must appeal the determination within 70 days from the date of the determination letter at [address], [address], [place], [place] [address], or [address], [place], [place] [address]. If you disagree with the  decision, you may appeal to the Department of Administration, . You must file your appeal within 30 days from the date of the  decision letter at [address], [address], [place], [place] [address], or [address], [place], [place] [address]. If you disagree with a decision of an , you may file a petition for judicial review with the District Court. You must do so within 30 days of the Appeal Officer’s decision. You may be represented by an  at your own expense or you may contact the [facility] for possible representation.    [place]  for Injured Workers ([place]IW): If you disagree with a  decision, you may request that NAIW represent you without charge at an  Hearing. For information regarding denial of benefits, you may contact the [facility] at: [address], [address], [place] [address], [phone], or [address], [place] [address], [phone]    To File a Complaint with the Division: If you wish to file a complaint with the  of the Division of Industrial Relations (DIR), please contact the Workers’ Compensation Section, [address], [address], [place], [place] [address], telephone (403) 578-0434, or [address], [place] [address], telephone [phone].    For assistance with Workers’ Compensation Issues: you may contact the Office of the Governor Consumer Health Assistance, [address], [address], [place], [place] [address], Toll Free 1-721.322.4142, Web site: http://govcha.[email or web address]., E-mail [email or web address].                                                                                                                                                                               __________________________________________________________________                                    _________________            Employee Name / Signature                                                                                                                            Date                                       D-2 (rev. 10/07)

## 2017-06-06 NOTE — LETTER
"  FORM C-4:  EMPLOYEE’S CLAIM FOR COMPENSATION/ REPORT OF INITIAL TREATMENT  EMPLOYEE’S CLAIM - PROVIDE ALL INFORMATION REQUESTED   First Name  Belgica Last Name  Arsenio Birthdate             Age  1990 26 y.o. 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]   What were you doing at the time of accident? (if applicable)  Cleaning Instrumentation    How did this injury or occupational disease occur? Be specific and answer in detail. Use additional sheet if necessary)  I was reaching for the scissors in the dirty tray and upon doing so was poked by the tip of an Iris scissor.   If you believe that you have an occupational disease, when did you first have knowledge of the disability and it relationship to your employment?  N/A Witnesses to the Accident  Marc Davis     Nature of Injury or Occupational Disease  Workers' Compensation  Part(s) of Body Injured or Affected  Finger (R), N/A, N/A    I certify that the above is true and correct to the best of my knowledge and that I " have provided this information in order to obtain the benefits of [place] or [place] of [place]).  I hereby authorize any physician, chiropractor, surgeon, practitioner, or other person, any hospital, including [facility] or [facility] hospital, any medical service organization, any insurance company, or other institution or organization to release to each other, any medical or other information, including benefits paid or payable, pertinent to this injury or disease, except information relative to diagnosis, treatment and/or counseling for AIDS, psychological conditions, alcohol or controlled substances, for which I must give specific authorization.  A Photostat of this authorization shall be as valid as the original.   Date  06/06/2017 Place  [facility] Employee’s Signature   THIS REPORT MUST BE COMPLETED AND MAILED WITHIN 3 WORKING DAYS OF TREATMENT   Place  [facility], EMERGENCY DEPT  Name of Facility   [facility]   Date  6/6/2017 Diagnosis  (Z57.8) Employee exposure to body fluids Is there evidence the injured employee was under the influence of alcohol and/or another controlled substance at the time of accident?   Hour  11:58 PM Description of Injury or Disease  Employee exposure to body fluids No   Treatment  Baseline labs  Have you advised the patient to remain off work five days or more?         No   X-Ray Findings    Comments:na   If Yes   From Date    To Date      From information given by the employee, together with medical evidence, can you directly connect this injury or occupational disease as job incurred?  Yes If No, is the employee capable of: Full Duty  Yes Modified Duty      Is additional medical care by a physician indicated?  Yes If Modified Duty, Specify any Limitations / Restrictions        Do you know of any previous injury or disease contributing to  "this condition or occupational disease?  No   Date  6/6/2017 Print Doctor’s Name  Fede Annie I certify the employer’s copy of this form was mailed on:   Address  [address] [address] [phone] Insurer’s Use Only   [facility]  [address]    Provider’s Tax ID Number    Telephone  Dept: [phone]    Doctor’s Signature  e-ANNIE Granados M.D. Degree   MD    Original - TREATING PHYSICIAN OR CHIROPRACTOR   Pg 2-Insurer/TPA   Pg 3-Employer   Pg 4-Employee                                                                                                  Form C-4 (rev01/03)     BRIEF DESCRIPTION OF RIGHTS AND BENEFITS  (Pursuant to [place] 616C.050)    Notice of Injury or Occupational Disease (Incident Report Form C-1): If an injury or occupational disease (OD) arises out of and in the course of employment, you must provide written notice to your employer as soon as practicable, but no later than 7 days after the accident or OD. Your employer shall maintain a sufficient supply of the required forms.    Claim for Compensation (Form C-4): If medical treatment is sought, the form C-4 is available at the place of initial treatment. A completed \"Claim for Compensation\" (Form C-4) must be filed within 90 days after an accident or OD. The treating physician or chiropractor must, within 3 working days after treatment, complete and mail to the employer, the employer's insurer and third-party , the Claim for Compensation.    Medical Treatment: If you require medical treatment for your on-the-job injury or OD, you may be required to select a physician or chiropractor from a list provided by your workers’ compensation insurer, if it has contracted with an Organization for Managed Care (MCO) or Preferred Provider Organization (PPO) or providers of health care. If your employer has not entered into a contract with an MCO or PPO, you may select a physician or chiropractor from the " Panel of Physicians and Chiropractors. Any medical costs related to your industrial injury or OD will be paid by your insurer.    Temporary Total Disability (TTD): If your doctor has certified that you are unable to work for a period of at least 5 consecutive days, or 5 cumulative days in a 20-day period, or places restrictions on you that your employer does not accommodate, you may be entitled to TTD compensation.    Temporary Partial Disability (TPD): If the wage you receive upon reemployment is less than the compensation for TTD to which you are entitled, the insurer may be required to pay you TPD compensation to make up the difference. TPD can only be paid for a maximum of 24 months.    Permanent Partial Disability (PPD): When your medical condition is stable and there is an indication of a PPD as a result of your injury or OD, within 30 days, your insurer must arrange for an evaluation by a rating physician or chiropractor to determine the degree of your PPD. The amount of your PPD award depends on the date of injury, the results of the PPD evaluation and your age and wage.    Permanent Total Disability (PTD): If you are medically certified by a treating physician or chiropractor as permanently and totally disabled and have been granted a PTD status by your insurer, you are entitled to receive monthly benefits not to exceed 66 2/3% of your average monthly wage. The amount of your PTD payments is subject to reduction if you previously received a PPD award.    Vocational Rehabilitation Services: You may be eligible for vocational rehabilitation services if you are unable to return to the job due to a permanent physical impairment or permanent restrictions as a result of your injury or occupational disease.    Transportation and Per Srinivasan Reimbursement: You may be eligible for travel expenses and per srinivasan associated with medical treatment.  Reopening: You may be able to reopen your claim if your condition worsens  after claim closure.    Appeal Process: If you disagree with a written determination issued by the insurer or the insurer does not respond to your request, you may appeal to the Department of Administration, , by following the instructions contained in your determination letter. You must appeal the determination within 70 days from the date of the determination letter at [address], [address], [place], [place] [address], or [address], [place], [place] [address]. If you disagree with the  decision, you may appeal to the Department of Administration, . You must file your appeal within 30 days from the date of the  decision letter at [address], [address], [place], [place] [address], or [address], [place], [place] [address]. If you disagree with a decision of an , you may file a petition for judicial review with the District Court. You must do so within 30 days of the Appeal Officer’s decision. You may be represented by an  at your own expense or you may contact the [facility] for possible representation.    [place]  for Injured Workers (NAIW): If you disagree with a  decision, you may request that NAIW represent you without charge at an  Hearing. For information regarding denial of benefits, you may contact the NA at: [address], [address], [place] [address], [phone], or [address], [place] [address], [phone]    To File a Complaint with the Division: If you wish to file a complaint with the  of the Division of Industrial Relations (DIR), please contact the Workers’ Compensation Section, [address], [place], [place] [address], telephone (678) [phone], or [address], [place] [address], telephone [phone].    For  assistance with Workers’ Compensation Issues: you may contact the Office of the Governor Consumer Health Assistance, [address] [place] [address], [place] [address], [place] [address], Toll Free 1-209.994.2687, Web site: http://govcha.[facility].nv., E-mail [email or web address].[email or web address].                                                                                                                                                                               __________________________________________________________________                                    _________________            Employee Name / Signature                                                                                                                            Date                                       D-2 (rev. 10/07)

## 2017-06-07 VITALS
DIASTOLIC BLOOD PRESSURE: 82 MMHG | SYSTOLIC BLOOD PRESSURE: 125 MMHG | TEMPERATURE: 98.1 F | WEIGHT: 134 LBS | HEIGHT: 64 IN | HEART RATE: 72 BPM | BODY MASS INDEX: 22.88 KG/M2 | RESPIRATION RATE: 18 BRPM | OXYGEN SATURATION: 99 %

## 2017-06-07 NOTE — ED NOTES
Chief Complaint   Patient presents with   • Body Fluid Exposure     Dirty scissors to right index finger.     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 Patient has MRN of source patient

## 2017-06-07 NOTE — DISCHARGE INSTRUCTIONS
Body Fluid Exposure  Body fluid exposure happens most often with blood and sexual contact. It also happens by sharing needles. Most of the time this type of exposure does not cause any problems. Several infections can be passed by body fluid exposure. The biggest risk is for getting hepatitis B or hepatitis C, or HIV infection (the virus that causes AIDS).  Hepatitis B and hepatitis C cause a serious liver infection. This can cause death. Immunization against hepatitis B can prevent this infection. Your caregiver may want you to get these shots. All health care workers or those exposed to human body fluids regularly should be immunized against hepatitis B. This requires a first dose with booster doses at 1 and 6 months. There is no hepatitis C vaccine. There is no vaccine against AIDS.  The risk of transmitting HIV infection by a single body fluid exposure is very small. Blood exposure to mucous membranes has on average caused 1 HIV infection for every 1,000 exposures if the source is known to carry HIV. About 1 in 300 needle stick recipients from a known HIV positive person get infected. Infection with HIV is very serious. High risk exposures should consider post-exposure preventive treatment. Treatment reduces the chance of getting an HIV infection.  A combination of anti-HIV drugs is recommended for risky exposures. Preventive treatment should be started as soon as possible. It is usually continued for 4 weeks. Blood tests for HIV should be taken immediately, and repeated at 3 to 6 weeks and again at 3 and 6 months.   Arrange follow-up with your caregiver.  Document Released: 12/18/2006 Document Revised: 03/11/2013 Document Reviewed: 06/06/2008  [employer]® Patient Information ©2014 [employer].

## 2017-06-07 NOTE — ED NOTES
Patient discharged at this time. Educated on medications and follow ups. Patient denies questions and denies a wheelchair for departure. Ambulatory upon exit.

## 2017-06-07 NOTE — ED PROVIDER NOTES
"      ED Provider Note    Scribed for Annie Mistry M.D. by Tong Thomas. 6/6/2017, 9:43 PM.    Primary Care Provider: Pcp Pt States None  Means of arrival: walk-in  History obtained from: patient  History limited by: none    CHIEF COMPLAINT  Chief Complaint   Patient presents with   • Body Fluid Exposure     Dirty scissors to right index finger.       HPI  Belgica Cesar is a 26 y.o. female who presents to the Emergency Department for evaluation status post body fluid exposure that occurred tonight. Per patient, she was cleaning dirty scissors. The scissors gave her a very small cut on her right index finger. Patient confirms she was double-gloved, and she washed her hands with soap and water immediately after the incident. The source patient is known. She denies bleeding, pain, or additional cuts. Patient has no medical history or additional complaints.     REVIEW OF SYSTEMS  Pertinent positives include body fluid exposure, cut to index finger. Pertinent negatives include no bleeding, pain, additional cuts.      E.     PAST MEDICAL HISTORY    No history pertinent to complaint.     SOCIAL HISTORY  Social History   Substance Use Topics   • Smoking status: None   • Smokeless tobacco: None   • Alcohol Use: None      History   Drug Use None       SURGICAL HISTORY  patient denies any surgical history     CURRENT MEDICATIONS  No current facility-administered medications on file prior to encounter.     Current Outpatient Prescriptions on File Prior to Encounter   Medication Sig Dispense Refill   • cyclobenzaprine (FLEXERIL) 10 MG Tab Take 1 Tab by mouth 3 times a day as needed. 30 Tab 0      ALLERGIES  Allergies   Allergen Reactions   • Pcn [Penicillins] Hives       PHYSICAL EXAM  VITAL SIGNS: /74 mmHg  Pulse 64  Temp(Src) 36.7 °C (98.1 °F)  Resp 19  Ht 1.626 m (5' 4\")  Wt 60.782 kg (134 lb)  BMI 22.99 kg/m2  SpO2 95%  Constitutional: Alert in no apparent distress. Well apearing  HENT: Normocephalic, " Atraumatic, Bilateral external ears normal. Nose normal.   Eyes:  Conjunctiva normal, non-icteric.   Lungs: Non-labored respirations  Skin: Warm, Dry, No erythema, No rash.   Neurologic: Alert, Grossly non-focal.   Psychiatric: Affect normal, Judgment normal, Mood normal, Appears appropriate and not intoxicated.   Extremities: right index finger has small 2 mm superficial laceration.       LABS  Results for orders placed or performed during the hospital encounter of 06/06/17   BLOOD AND BODY FLUID EXPOSURE (EXPOSED - SOURCE PATIENT NEG)   Result Value Ref Range    Hep B Surface Antibody Quant 238.55 (H) 0.00 - 10.00 mIU/mL    Hepatitis B Surface Antigen Negative Negative    Hepatitis C Antibody Negative Negative    Hepatitis B Ccore Ab, Total Negative Negative    HIV Ag/Ab Combo Assay Non Reactive Non Reactive      All labs reviewed by me.    COURSE & MEDICAL DECISION MAKING  Pertinent Labs & Imaging studies reviewed. (See chart for details)    9:43 PM - Ordered Blood and Body Fluid Exposure to evaluate her. Source patient is negative. Patient's baseline labs have been drawn. She will follow up with occupational health.      The patient will return for new or worsening symptoms and is stable at the time of discharge. Patient was given return precautions. Patient and/or family member verbalizes understanding and will comply.    DISPOSITION:  Patient will be discharged home in stable condition.    FOLLOW UP:  [address] [address]  [phone]    Schedule an appointment as soon as possible for a visit  tomorrow    [facility], Emergency Dept  [address] [id number] [phone]    Return for redness, swelling, fever or other concerns    FINAL IMPRESSION  1. Employee exposure to body fluids         This dictation has been created using voice recognition software and/or scribes. The accuracy of the dictation is limited by the abilities of the  software and the expertise of the scribes. I expect there may be some errors of grammar and possibly content. I made every attempt to manually correct the errors within my dictation. However, errors related to voice recognition software and/or scribes may still exist and should be interpreted within the appropriate context.     I, Tong Thomas (Scribe), am scribing for, and in the presence of, Annie Mistry M.D..    Electronically signed by: Tong Thomas (Scribe), 6/6/2017    IAnnie M.D. personally performed the services described in this documentation, as scribed by Tong Thomas in my presence, and it is both accurate and complete.    The note accurately reflects work and decisions made by me.  Annie Mistry  6/7/2017  4:10 AM

## 2017-06-15 NOTE — PROGRESS NOTES
Christina BELLA was called out after business hours to [facility] for a post-accident UDS and BAT on 6/6/17 for [facility].    UDS collected at 9:45 pm.  BAT collected at 9:26 pm.

## 2017-07-27 ENCOUNTER — EH NON-PROVIDER (OUTPATIENT)
Dept: OCCUPATIONAL MEDICINE | Facility: CLINIC | Age: 27
End: 2017-07-27

## 2017-07-27 DIAGNOSIS — Z23 NEED FOR VACCINATION: Primary | ICD-10-CM

## 2017-07-27 PROCEDURE — 90746 HEPB VACCINE 3 DOSE ADULT IM: CPT | Performed by: PREVENTIVE MEDICINE

## 2017-07-27 NOTE — MR AVS SNAPSHOT
Belgica Cesar   2017 11:30 AM    Non-Provider   MRN: [id number]    Department:  [facility]   Dept Phone:  [phone]    Description:  Female : 1990   Provider:  [facility] PRATIMA SAUNDERS R.N.           Reason for Visit     Immunizations 3rd hep b vaccine.      Allergies as of 2017     Allergen Noted Reactions    Pcn [Penicillins] 2017   Hives      You were diagnosed with     Need for vaccination   [[id number]]  -  Primary       Basic Information     Date Of Birth Sex Race Ethnicity Preferred Language    1990 Female White Non- English      Health Maintenance        Date Due Completion Dates    IMM HEP A VACCINE (1 of 2 - Standard Series) 1991 ---    IMM HPV VACCINE (1 of 3 - Female 3 Dose Series) 2001 ---    IMM VARICELLA (CHICKENPOX) VACCINE (1 of 2 - 2 Dose Adolescent Series) 2003 ---    IMM DTaP/Tdap/Td Vaccine (1 - Tdap) 2009 ---    IMM HEP B VACCINE (3 of 3 - Primary Series) 2016 3/17/2016, 2016    IMM INFLUENZA (1) 2017 10/6/2016, 2016    PAP SMEAR 2020            Current Immunizations     Hepatitis B Vaccine Recombivax (Adol/Adult) 2017, 3/17/2016, 2016    Influenza Vaccine Quad Inj (Pf) 10/6/2016    Influenza Vaccine Quad Inj (Preserved) 2016      Below and/or attached are the medications your provider expects you to take. Review all of your home medications and newly ordered medications with your provider and/or pharmacist. Follow medication instructions as directed by your provider and/or pharmacist. Please keep your medication list with you and share with your provider. Update the information when medications are discontinued, doses are changed, or new medications (including over-the-counter products) are added; and carry medication information at all times in the event of emergency situations     Allergies:  PCN - Hives               Medications  Valid as of: 2017 -  1:01 PM      Generic Name Brand Name Tablet Size Instructions for use    Cyclobenzaprine HCl (Tab) FLEXERIL 10 MG Take 1 Tab by mouth 3 times a day as needed.        .                 Medicines prescribed today were sent to:     [id number]  [place], [place] - [address] AT [facility]    [address] [place] [address]    Phone: [phone] Fax: [phone]    Open 24 Hours?: No      Medication refill instructions:       If your prescription bottle indicates you have medication refills left, it is not necessary to call your provider’s office. Please contact your pharmacy and they will refill your medication.    If your prescription bottle indicates you do not have any refills left, you may request refills at any time through one of the following ways: The online [employer] system (except Urgent Care), by calling your provider’s office, or by asking your pharmacy to contact your provider’s office with a refill request. Medication refills are processed only during regular business hours and may not be available until the next business day. Your provider may request additional information or to have a follow-up visit with you prior to refilling your medication.   *Please Note: Medication refills are assigned a new Rx number when refilled electronically. Your pharmacy may indicate that no refills were authorized even though a new prescription for the same medication is available at the pharmacy. Please request the medicine by name with the pharmacy before contacting your provider for a refill.           [employer] Access Code: Activation code not generated  Current [employer] Status: Active

## 2017-08-07 ENCOUNTER — NON-PROVIDER VISIT (OUTPATIENT)
Dept: OCCUPATIONAL MEDICINE | Facility: CLINIC | Age: 27
End: 2017-08-07

## 2017-08-07 DIAGNOSIS — R76.11 POSITIVE PPD: ICD-10-CM

## 2017-08-07 PROCEDURE — 86580 TB INTRADERMAL TEST: CPT | Performed by: PREVENTIVE MEDICINE

## 2017-08-07 NOTE — MR AVS SNAPSHOT
Belgica Cesar   2017 2:10 PM   Non-Provider Visit   MRN: [id number]    Department:  n [facility]   Dept Phone:  [phone]    Description:  Female : 1990   Provider:  [place] NON RENLUCIE SAUNDERS           Reason for Visit     PPD Placement           Allergies as of 2017     Allergen Noted Reactions    Pcn [Penicillins] 2017   Hives      You were diagnosed with     Positive PPD   [[id number]]         Basic Information     Date Of Birth Sex Race Ethnicity Preferred Language    1990 Female White Non- English      Your appointments     Aug 07, 2017  2:10 PM   TB Placement with [facility] NON [facility] NURSE   [facility])    [address]  [address]  [place] [address] [phone]              Health Maintenance        Date Due Completion Dates    IMM HEP A VACCINE (1 of 2 - Standard Series) 1991 ---    IMM HPV VACCINE (1 of 3 - Female 3 Dose Series) 2001 ---    IMM VARICELLA (CHICKENPOX) VACCINE (1 of 2 - 2 Dose Adolescent Series) 2003 ---    IMM DTaP/Tdap/Td Vaccine (1 - Tdap) 2009 ---    IMM INFLUENZA (1) 2017 10/6/2016, 2016    PAP SMEAR 2020            Current Immunizations     Hepatitis B Vaccine Recombivax (Adol/Adult) 2017, 3/17/2016, 2016    Influenza Vaccine Quad Inj (Pf) 10/6/2016    Influenza Vaccine Quad Inj (Preserved) 2016    Tuberculin Skin Test 2017  1:33 PM      Below and/or attached are the medications your provider expects you to take. Review all of your home medications and newly ordered medications with your provider and/or pharmacist. Follow medication instructions as directed by your provider and/or pharmacist. Please keep your medication list with you and share with your provider. Update the information when medications are discontinued, doses are changed, or new medications (including over-the-counter products) are added; and carry medication information at all  times in the event of emergency situations     Allergies:  PCN - Hives               Medications  Valid as of: August 07, 2017 -  1:38 PM    Generic Name Brand Name Tablet Size Instructions for use    Cyclobenzaprine HCl (Tab) FLEXERIL 10 MG Take 1 Tab by mouth 3 times a day as needed.        .                 Medicines prescribed today were sent to:     [facility] [id number] [place] [address], [place] - [address] [facility] [address]    Phone: [phone] Fax: [phone]    Open 24 Hours?: No      Medication refill instructions:       If your prescription bottle indicates you have medication refills left, it is not necessary to call your provider’s office. Please contact your pharmacy and they will refill your medication.    If your prescription bottle indicates you do not have any refills left, you may request refills at any time through one of the following ways: The online [employer] system (except Urgent Care), by calling your provider’s office, or by asking your pharmacy to contact your provider’s office with a refill request. Medication refills are processed only during regular business hours and may not be available until the next business day. Your provider may request additional information or to have a follow-up visit with you prior to refilling your medication.   *Please Note: Medication refills are assigned a new Rx number when refilled electronically. Your pharmacy may indicate that no refills were authorized even though a new prescription for the same medication is available at the pharmacy. Please request the medicine by name with the pharmacy before contacting your provider for a refill.           [employer] Access Code: Activation code not generated  Current [employer] Status: Active

## 2017-08-14 ENCOUNTER — NON-PROVIDER VISIT (OUTPATIENT)
Dept: OCCUPATIONAL MEDICINE | Facility: CLINIC | Age: 27
End: 2017-08-14

## 2017-08-14 DIAGNOSIS — Z11.1 ENCOUNTER FOR PPD TEST: ICD-10-CM

## 2017-08-14 PROCEDURE — 86580 TB INTRADERMAL TEST: CPT | Performed by: PREVENTIVE MEDICINE

## 2017-08-17 ENCOUNTER — NON-PROVIDER VISIT (OUTPATIENT)
Dept: OCCUPATIONAL MEDICINE | Facility: CLINIC | Age: 27
End: 2017-08-17

## 2017-08-17 LAB — TB WHEAL 3D P 5 TU DIAM: NORMAL MM

## 2017-08-21 ENCOUNTER — NON-PROVIDER VISIT (OUTPATIENT)
Dept: OCCUPATIONAL MEDICINE | Facility: CLINIC | Age: 27
End: 2017-08-21

## 2017-08-21 DIAGNOSIS — Z11.1 PPD SCREENING TEST: ICD-10-CM

## 2017-08-21 PROCEDURE — 86580 TB INTRADERMAL TEST: CPT | Performed by: PREVENTIVE MEDICINE

## 2017-08-21 NOTE — NON-PROVIDER
SELF PAY - 2 STEP TB PLACEMENT  1st placement administered.   Patient is advised to come back to [address] [place] [address] for the reading in 48-72 hours.   Pt was given the original copy of TB patient instructions.

## 2017-08-21 NOTE — MR AVS SNAPSHOT
Belgica Cesar   2017 12:40 PM   Non-Provider Visit   MRN: [id number]    Department:  [facility]   Dept Phone:  [phone]    Description:  Female : 1990   Provider:  [place] NON RENLUCIE SAUNDERS           Reason for Visit     PPD Placement           Allergies as of 2017     Allergen Noted Reactions    Pcn [Penicillins] 2017   Hives      You were diagnosed with     PPD screening test   [[id number]]         Basic Information     Date Of Birth Sex Race Ethnicity Preferred Language    1990 Female White Non- English      Health Maintenance        Date Due Completion Dates    IMM HEP A VACCINE (1 of 2 - Standard Series) 1991 ---    IMM HPV VACCINE (1 of 3 - Female 3 Dose Series) 2001 ---    IMM VARICELLA (CHICKENPOX) VACCINE (1 of 2 - 2 Dose Adolescent Series) 2003 ---    IMM DTaP/Tdap/Td Vaccine (1 - Tdap) 2009 ---    IMM INFLUENZA (1) 2017 10/6/2016, 2016    PAP SMEAR 2020            Current Immunizations     Hepatitis B Vaccine Recombivax (Adol/Adult) 2017, 3/17/2016, 2016    Influenza Vaccine Quad Inj (Pf) 10/6/2016    Influenza Vaccine Quad Inj (Preserved) 2016    Tuberculin Skin Test 2017 12:46 PM, 2017  2:35 PM, 2017  1:33 PM      Below and/or attached are the medications your provider expects you to take. Review all of your home medications and newly ordered medications with your provider and/or pharmacist. Follow medication instructions as directed by your provider and/or pharmacist. Please keep your medication list with you and share with your provider. Update the information when medications are discontinued, doses are changed, or new medications (including over-the-counter products) are added; and carry medication information at all times in the event of emergency situations     Allergies:  PCN - Hives               Medications  Valid as of: 2017 -  1:07 PM    Generic Name Brand Name  Tablet Size Instructions for use    Cyclobenzaprine HCl (Tab) FLEXERIL 10 MG Take 1 Tab by mouth 3 times a day as needed.        .                 Medicines prescribed today were sent to:     [id number] - [place], [place] - [address] [place] [address] [address] AT [facility]    [address] [place] [address]    Phone: [phone] Fax: [phone]    Open 24 Hours?: No      Medication refill instructions:       If your prescription bottle indicates you have medication refills left, it is not necessary to call your provider’s office. Please contact your pharmacy and they will refill your medication.    If your prescription bottle indicates you do not have any refills left, you may request refills at any time through one of the following ways: The online [employer] system (except Urgent Care), by calling your provider’s office, or by asking your pharmacy to contact your provider’s office with a refill request. Medication refills are processed only during regular business hours and may not be available until the next business day. Your provider may request additional information or to have a follow-up visit with you prior to refilling your medication.   *Please Note: Medication refills are assigned a new Rx number when refilled electronically. Your pharmacy may indicate that no refills were authorized even though a new prescription for the same medication is available at the pharmacy. Please request the medicine by name with the pharmacy before contacting your provider for a refill.           [employer] Access Code: Activation code not generated  Current [employer] Status: Active

## 2017-08-24 ENCOUNTER — NON-PROVIDER VISIT (OUTPATIENT)
Dept: OCCUPATIONAL MEDICINE | Facility: CLINIC | Age: 27
End: 2017-08-24

## 2017-08-24 LAB — TB WHEAL 3D P 5 TU DIAM: NORMAL MM

## 2017-10-05 ENCOUNTER — IMMUNIZATION (OUTPATIENT)
Dept: OCCUPATIONAL MEDICINE | Facility: CLINIC | Age: 27
End: 2017-10-05

## 2017-10-05 DIAGNOSIS — Z23 NEED FOR VACCINATION: ICD-10-CM

## 2017-10-05 PROCEDURE — 90686 IIV4 VACC NO PRSV 0.5 ML IM: CPT | Performed by: PREVENTIVE MEDICINE

## 2017-10-11 ENCOUNTER — HOSPITAL ENCOUNTER (EMERGENCY)
Facility: MEDICAL CENTER | Age: 27
End: 2017-10-11
Attending: EMERGENCY MEDICINE
Payer: COMMERCIAL

## 2017-10-11 ENCOUNTER — APPOINTMENT (OUTPATIENT)
Dept: RADIOLOGY | Facility: MEDICAL CENTER | Age: 27
End: 2017-10-11
Attending: EMERGENCY MEDICINE
Payer: COMMERCIAL

## 2017-10-11 VITALS
HEIGHT: 64 IN | DIASTOLIC BLOOD PRESSURE: 73 MMHG | BODY MASS INDEX: 22.73 KG/M2 | HEART RATE: 69 BPM | OXYGEN SATURATION: 95 % | SYSTOLIC BLOOD PRESSURE: 140 MMHG | RESPIRATION RATE: 16 BRPM | WEIGHT: 133.16 LBS | TEMPERATURE: 98.2 F

## 2017-10-11 DIAGNOSIS — N83.209 HEMORRHAGIC OVARIAN CYST: ICD-10-CM

## 2017-10-11 LAB
ALBUMIN SERPL BCP-MCNC: 4.4 G/DL (ref 3.2–4.9)
ALBUMIN/GLOB SERPL: 1.8 G/DL
ALP SERPL-CCNC: 106 U/L (ref 30–99)
ALT SERPL-CCNC: 19 U/L (ref 2–50)
ANION GAP SERPL CALC-SCNC: 11 MMOL/L (ref 0–11.9)
APPEARANCE UR: CLEAR
AST SERPL-CCNC: 25 U/L (ref 12–45)
BACTERIA GENITAL QL WET PREP: NORMAL
BASOPHILS # BLD AUTO: 0.5 % (ref 0–1.8)
BASOPHILS # BLD: 0.05 K/UL (ref 0–0.12)
BILIRUB SERPL-MCNC: 0.5 MG/DL (ref 0.1–1.5)
BILIRUB UR QL STRIP.AUTO: NEGATIVE
BUN SERPL-MCNC: 21 MG/DL (ref 8–22)
CALCIUM SERPL-MCNC: 9.2 MG/DL (ref 8.4–10.2)
CHLORIDE SERPL-SCNC: 106 MMOL/L (ref 96–112)
CO2 SERPL-SCNC: 22 MMOL/L (ref 20–33)
COLOR UR: YELLOW
CREAT SERPL-MCNC: 0.91 MG/DL (ref 0.5–1.4)
CULTURE IF INDICATED INDCX: NO UA CULTURE
EOSINOPHIL # BLD AUTO: 0.26 K/UL (ref 0–0.51)
EOSINOPHIL NFR BLD: 2.4 % (ref 0–6.9)
ERYTHROCYTE [DISTWIDTH] IN BLOOD BY AUTOMATED COUNT: 41.2 FL (ref 35.9–50)
GFR SERPL CREATININE-BSD FRML MDRD: >60 ML/MIN/1.73 M 2
GLOBULIN SER CALC-MCNC: 2.5 G/DL (ref 1.9–3.5)
GLUCOSE SERPL-MCNC: 112 MG/DL (ref 65–99)
GLUCOSE UR STRIP.AUTO-MCNC: NEGATIVE MG/DL
HCG SERPL QL: NEGATIVE
HCT VFR BLD AUTO: 39.5 % (ref 37–47)
HGB BLD-MCNC: 13.4 G/DL (ref 12–16)
IMM GRANULOCYTES # BLD AUTO: 0.02 K/UL (ref 0–0.11)
IMM GRANULOCYTES NFR BLD AUTO: 0.2 % (ref 0–0.9)
KETONES UR STRIP.AUTO-MCNC: NEGATIVE MG/DL
LEUKOCYTE ESTERASE UR QL STRIP.AUTO: NEGATIVE
LIPASE SERPL-CCNC: 26 U/L (ref 7–58)
LYMPHOCYTES # BLD AUTO: 3.75 K/UL (ref 1–4.8)
LYMPHOCYTES NFR BLD: 34.4 % (ref 22–41)
MCH RBC QN AUTO: 30.7 PG (ref 27–33)
MCHC RBC AUTO-ENTMCNC: 33.9 G/DL (ref 33.6–35)
MCV RBC AUTO: 90.4 FL (ref 81.4–97.8)
MICRO URNS: NORMAL
MONOCYTES # BLD AUTO: 0.9 K/UL (ref 0–0.85)
MONOCYTES NFR BLD AUTO: 8.3 % (ref 0–13.4)
NEUTROPHILS # BLD AUTO: 5.92 K/UL (ref 2–7.15)
NEUTROPHILS NFR BLD: 54.2 % (ref 44–72)
NITRITE UR QL STRIP.AUTO: NEGATIVE
NRBC # BLD AUTO: 0 K/UL
NRBC BLD AUTO-RTO: 0 /100 WBC
PH UR STRIP.AUTO: 5.5 [PH]
PLATELET # BLD AUTO: 211 K/UL (ref 164–446)
PMV BLD AUTO: 11.4 FL (ref 9–12.9)
POTASSIUM SERPL-SCNC: 3.4 MMOL/L (ref 3.6–5.5)
PROT SERPL-MCNC: 6.9 G/DL (ref 6–8.2)
PROT UR QL STRIP: NEGATIVE MG/DL
RBC # BLD AUTO: 4.37 M/UL (ref 4.2–5.4)
RBC UR QL AUTO: NEGATIVE
SIGNIFICANT IND 70042: NORMAL
SITE SITE: NORMAL
SODIUM SERPL-SCNC: 139 MMOL/L (ref 135–145)
SOURCE SOURCE: NORMAL
SP GR UR STRIP.AUTO: 1.02
WBC # BLD AUTO: 10.9 K/UL (ref 4.8–10.8)

## 2017-10-11 PROCEDURE — 87491 CHLMYD TRACH DNA AMP PROBE: CPT

## 2017-10-11 PROCEDURE — 76830 TRANSVAGINAL US NON-OB: CPT

## 2017-10-11 PROCEDURE — 99285 EMERGENCY DEPT VISIT HI MDM: CPT

## 2017-10-11 PROCEDURE — 80053 COMPREHEN METABOLIC PANEL: CPT

## 2017-10-11 PROCEDURE — 87591 N.GONORRHOEAE DNA AMP PROB: CPT

## 2017-10-11 PROCEDURE — 81003 URINALYSIS AUTO W/O SCOPE: CPT

## 2017-10-11 PROCEDURE — 85025 COMPLETE CBC W/AUTO DIFF WBC: CPT

## 2017-10-11 PROCEDURE — 96376 TX/PRO/DX INJ SAME DRUG ADON: CPT

## 2017-10-11 PROCEDURE — 700111 HCHG RX REV CODE 636 W/ 250 OVERRIDE (IP): Performed by: EMERGENCY MEDICINE

## 2017-10-11 PROCEDURE — 96374 THER/PROPH/DIAG INJ IV PUSH: CPT

## 2017-10-11 PROCEDURE — 83690 ASSAY OF LIPASE: CPT

## 2017-10-11 PROCEDURE — 96375 TX/PRO/DX INJ NEW DRUG ADDON: CPT

## 2017-10-11 PROCEDURE — 84703 CHORIONIC GONADOTROPIN ASSAY: CPT

## 2017-10-11 RX ORDER — ONDANSETRON 2 MG/ML
4 INJECTION INTRAMUSCULAR; INTRAVENOUS ONCE
Status: COMPLETED | OUTPATIENT
Start: 2017-10-11 | End: 2017-10-11

## 2017-10-11 RX ORDER — MORPHINE SULFATE 4 MG/ML
4 INJECTION, SOLUTION INTRAMUSCULAR; INTRAVENOUS ONCE
Status: COMPLETED | OUTPATIENT
Start: 2017-10-11 | End: 2017-10-11

## 2017-10-11 RX ORDER — ONDANSETRON 4 MG/1
4 TABLET, ORALLY DISINTEGRATING ORAL ONCE
Qty: 10 TAB | Refills: 0 | Status: SHIPPED | OUTPATIENT
Start: 2017-10-11 | End: 2017-10-11

## 2017-10-11 RX ORDER — HYDROCODONE BITARTRATE AND ACETAMINOPHEN 5; 325 MG/1; MG/1
1 TABLET ORAL EVERY 6 HOURS PRN
Qty: 5 TAB | Refills: 0 | Status: SHIPPED | OUTPATIENT
Start: 2017-10-11 | End: 2018-01-26

## 2017-10-11 RX ADMIN — ONDANSETRON 4 MG: 2 INJECTION INTRAMUSCULAR; INTRAVENOUS at 20:01

## 2017-10-11 RX ADMIN — MORPHINE SULFATE 4 MG: 4 INJECTION INTRAVENOUS at 20:42

## 2017-10-11 RX ADMIN — MORPHINE SULFATE 4 MG: 4 INJECTION INTRAVENOUS at 20:02

## 2017-10-11 ASSESSMENT — PAIN SCALES - GENERAL
PAINLEVEL_OUTOF10: 10
PAINLEVEL_OUTOF10: 10

## 2017-10-12 ENCOUNTER — PATIENT OUTREACH (OUTPATIENT)
Dept: HEALTH INFORMATION MANAGEMENT | Facility: OTHER | Age: 27
End: 2017-10-12

## 2017-10-12 LAB
C TRACH DNA SPEC QL NAA+PROBE: NEGATIVE
N GONORRHOEA DNA SPEC QL NAA+PROBE: NEGATIVE
SPECIMEN SOURCE: NORMAL

## 2017-10-12 NOTE — ED NOTES
Pelvic exam performed by ERP w/female tech chaperone.  Pt privacy maintained and pt tolerated exam well.  Samples sent to lab.

## 2017-10-12 NOTE — ED PROVIDER NOTES
"ER Provider Note         CHIEF COMPLAINT  Chief Complaint   Patient presents with   • Abdominal Pain   • Pelvic Pain   • Vomiting       HPI  Belgica Cesar is a 26 y.o. female who presents to the Emergency Department  With sudden onset of extreme right lower quadrant tenderness to palpation and radiation down into her pelvic region. The patient denies any fevers or chills. The patient denies any epigastric tenderness. The patient said she vomited once secondary to the extreme pain. The patient denies any vaginal discharge or bleeding. The patient is sexually active with one partner and uses protection. The patient denies any pain with sex. The patient denies any dysuria. The patient says the pain is worsened when she sits. She describes it as somewhat between a 7 and a 9 out of 10 in intensity. It does get somewhat better and then gets worse. The patient does have a history of cysts and she says that the pain is similar but more intense this time. Patient denies any diarrhea, chest pain or shortness of breath.    REVIEW OF SYSTEMS  See HPI for further details. All other systems are negative.     PAST MEDICAL HISTORY   has a past medical history of Migraines.    SURGICAL HISTORY  patient denies any surgical history    SOCIAL HISTORY  Social History   Substance Use Topics   • Smoking status: Never Smoker   • Smokeless tobacco: Never Used   • Alcohol use No      History   Drug Use No       FAMILY HISTORY  History reviewed. No pertinent family history.    CURRENT MEDICATIONS  Home Medications     Reviewed by Cathy Burger R.N. (Registered Nurse) on 10/11/17 at 1930  Med List Status: Complete   Medication Last Dose Status        Patient Solomon Taking any Medications                       ALLERGIES  Allergies   Allergen Reactions   • Pcn [Penicillins] Hives       PHYSICAL EXAM  VITAL SIGNS: /73   Pulse 69   Temp 36.8 °C (98.2 °F)   Resp 16   Ht 1.626 m (5' 4\")   Wt 60.4 kg (133 lb 2.5 oz)   LMP 09/27/2017   " SpO2 95%   BMI 22.86 kg/m²      Constitutional: Alert And occasionally grimacing in pain.  HENT: No signs of trauma, Bilateral external ears normal, Nose normal.   Eyes: Pupils are equal and reactive, Conjunctiva normal, Non-icteric.   Neck: Normal range of motion, No tenderness, Supple, No stridor.   Lymphatic: No lymphadenopathy noted.   Cardiovascular: Regular rate and rhythm, no murmurs.   Thorax & Lungs: Normal breath sounds, No respiratory distress, No wheezing, No chest tenderness.   Abdomen: Bowel sounds normal, Soft,Voluntary guarding on palpation to the left and right lower quadrants with right being more tender than left.   Skin: Warm, Dry, No erythema, No rash.   Back: No bony tenderness, No CVA tenderness.   Extremities: Intact distal pulses, No edema, No tenderness, No cyanosis.  Musculoskeletal: Good range of motion in all major joints. No tenderness to palpation or major deformities noted.   Neurologic: Alert , Normal motor function, Normal sensory function, No focal deficits noted.   Gu: Patient has no discharge but does have right and left but right more than left adnexal tenderness. There is no masses palpated on bimanual exam. Os closed.  Psychiatric: Affect normal, Judgment normal, Mood normal.         DIAGNOSTIC STUDIES / PROCEDURES    LABS  Results for orders placed or performed during the hospital encounter of 10/11/17   CBC WITH DIFFERENTIAL   Result Value Ref Range    WBC 10.9 (H) 4.8 - 10.8 K/uL    RBC 4.37 4.20 - 5.40 M/uL    Hemoglobin 13.4 12.0 - 16.0 g/dL    Hematocrit 39.5 37.0 - 47.0 %    MCV 90.4 81.4 - 97.8 fL    MCH 30.7 27.0 - 33.0 pg    MCHC 33.9 33.6 - 35.0 g/dL    RDW 41.2 35.9 - 50.0 fL    Platelet Count 211 164 - 446 K/uL    MPV 11.4 9.0 - 12.9 fL    Neutrophils-Polys 54.20 44.00 - 72.00 %    Lymphocytes 34.40 22.00 - 41.00 %    Monocytes 8.30 0.00 - 13.40 %    Eosinophils 2.40 0.00 - 6.90 %    Basophils 0.50 0.00 - 1.80 %    Immature Granulocytes 0.20 0.00 - 0.90 %     Nucleated RBC 0.00 /100 WBC    Neutrophils (Absolute) 5.92 2.00 - 7.15 K/uL    Lymphs (Absolute) 3.75 1.00 - 4.80 K/uL    Monos (Absolute) 0.90 (H) 0.00 - 0.85 K/uL    Eos (Absolute) 0.26 0.00 - 0.51 K/uL    Baso (Absolute) 0.05 0.00 - 0.12 K/uL    Immature Granulocytes (abs) 0.02 0.00 - 0.11 K/uL    NRBC (Absolute) 0.00 K/uL   COMP METABOLIC PANEL   Result Value Ref Range    Sodium 139 135 - 145 mmol/L    Potassium 3.4 (L) 3.6 - 5.5 mmol/L    Chloride 106 96 - 112 mmol/L    Co2 22 20 - 33 mmol/L    Anion Gap 11.0 0.0 - 11.9    Glucose 112 (H) 65 - 99 mg/dL    Bun 21 8 - 22 mg/dL    Creatinine 0.91 0.50 - 1.40 mg/dL    Calcium 9.2 8.4 - 10.2 mg/dL    AST(SGOT) 25 12 - 45 U/L    ALT(SGPT) 19 2 - 50 U/L    Alkaline Phosphatase 106 (H) 30 - 99 U/L    Total Bilirubin 0.5 0.1 - 1.5 mg/dL    Albumin 4.4 3.2 - 4.9 g/dL    Total Protein 6.9 6.0 - 8.2 g/dL    Globulin 2.5 1.9 - 3.5 g/dL    A-G Ratio 1.8 g/dL   LIPASE   Result Value Ref Range    Lipase 26 7 - 58 U/L   URINALYSIS CULTURE, IF INDICATED   Result Value Ref Range    Color Yellow     Character Clear     Specific Gravity 1.025 <1.035    Ph 5.5 5.0 - 8.0    Glucose Negative Negative mg/dL    Ketones Negative Negative mg/dL    Protein Negative Negative mg/dL    Bilirubin Negative Negative    Nitrite Negative Negative    Leukocyte Esterase Negative Negative    Occult Blood Negative Negative    Micro Urine Req see below     Culture Indicated No UA Culture   HCG QUAL SERUM   Result Value Ref Range    Beta-Hcg Qualitative Serum Negative Negative   WET PREP   Result Value Ref Range    Significant Indicator NEG     Source GEN     Site CERVICAL     Wet Prep For Parasites       No yeast.  No motile Trichomonas seen.  No clue cells seen.     CHLAMYDIA/GC PCR URINE OR SWAB   Result Value Ref Range    Source Vaginal    ESTIMATED GFR   Result Value Ref Range    GFR If African American >60 >60 mL/min/1.73 m 2    GFR If Non African American >60 >60 mL/min/1.73 m 2       All labs  reviewed by me.    RADIOLOGY  US-GYN-PELVIS TRANSVAGINAL   Final Result      1.  4.2 cm complex complex mass versus hemorrhagic cyst in the LEFT ovary.   2.  No evidence for ovarian torsion.   3.  Complex pelvic free fluid suggesting hemorrhage.   4.  Thickened endometrial stripe, likely due to phase of cycle.          The radiologist's interpretation of all radiological studies have been reviewed by me.    COURSE & MEDICAL DECISION MAKING  Pertinent Labs & Imaging studies reviewed. (See chart for details)    This is a 26 y.o. female that presents who presents with sudden onset right lower quadrant pain. I'm concerned about torsion versus cyst rupture and less likely appendicitis or pyelonephritis. I will order a pelvic ultrasound as well as send vaginosis and GC/chlamydia chlamydia swabs. In additional also get a CBC looking for anemia and CMP looking for hepatitis. In addition I will send a lipase looking for pancreatitis. In addition we'll also send an hCG qualitative to assess for pregnancy. I will give the patient pain meds as well as nausea meds given her extreme pain and episode of vomiting..     8:22 PM - Patient seen and examined at bedside.     Patient found to have no anemia as well as no vital sign perturbation's. In addition the patient has a negative hCG therefore ectopic as ruled out. The ultrasound shows no evidence of torsion but a large cyst which is likely the cause of the patient's pain. There is also pelvic free fluid but the patient is not peritoneal on exam. The patient's vaginosis is negative. The patient does have a very mildly decreased potassium. After pain medication the patient is feeling much better. She has an ObGyn of which she will follow-up with him next week. Strict return precautions were given.      FINAL IMPRESSION  1. Hemorrhagic ovarian cyst              Electronically signed by: Keegan Renae, 10/11/2017

## 2017-10-12 NOTE — ED NOTES
Pt reports eating and then having sharp abdominal pain and vomiting. Pain is lower abdominal or pelvic area x 45 minutes

## 2017-10-12 NOTE — ED NOTES
Assisted with patient care. IV established. Patient medicated for 15/10 abdominal pain. All samples are in the lab.

## 2017-10-12 NOTE — ED NOTES
Pt and spouse given verbal and written discharge instructions with two prescriptions. Verbalized understanding.

## 2017-10-12 NOTE — DISCHARGE INSTRUCTIONS
"Ovarian Cyst  An ovarian cyst is a fluid-filled sac that forms on an ovary. The ovaries are small organs that produce eggs in women. Various types of cysts can form on the ovaries. Most are not cancerous. Many do not cause problems, and they often go away on their own. Some may cause symptoms and require treatment. Common types of ovarian cysts include:  · Functional cysts--These cysts may occur every month during the menstrual cycle. This is normal. The cysts usually go away with the next menstrual cycle if the woman does not get pregnant. Usually, there are no symptoms with a functional cyst.  · Endometrioma cysts--These cysts form from the tissue that lines the uterus. They are also called \"chocolate cysts\" because they become filled with blood that turns brown. This type of cyst can cause pain in the lower abdomen during intercourse and with your menstrual period.  · Cystadenoma cysts--This type develops from the cells on the outside of the ovary. These cysts can get very big and cause lower abdomen pain and pain with intercourse. This type of cyst can twist on itself, cut off its blood supply, and cause severe pain. It can also easily rupture and cause a lot of pain.  · Dermoid cysts--This type of cyst is sometimes found in both ovaries. These cysts may contain different kinds of body tissue, such as skin, teeth, hair, or cartilage. They usually do not cause symptoms unless they get very big.  · Theca lutein cysts--These cysts occur when too much of a certain hormone (human chorionic gonadotropin) is produced and overstimulates the ovaries to produce an egg. This is most common after procedures used to assist with the conception of a baby (in vitro fertilization).  CAUSES   · Fertility drugs can cause a condition in which multiple large cysts are formed on the ovaries. This is called ovarian hyperstimulation syndrome.  · A condition called polycystic ovary syndrome can cause hormonal imbalances that can lead to " nonfunctional ovarian cysts.  SIGNS AND SYMPTOMS   Many ovarian cysts do not cause symptoms. If symptoms are present, they may include:  · Pelvic pain or pressure.  · Pain in the lower abdomen.  · Pain during sexual intercourse.  · Increasing girth (swelling) of the abdomen.  · Abnormal menstrual periods.  · Increasing pain with menstrual periods.  · Stopping having menstrual periods without being pregnant.  DIAGNOSIS   These cysts are commonly found during a routine or annual pelvic exam. Tests may be ordered to find out more about the cyst. These tests may include:  · Ultrasound.  · X-ray of the pelvis.  · CT scan.  · MRI.  · Blood tests.  TREATMENT   Many ovarian cysts go away on their own without treatment. Your health care provider may want to check your cyst regularly for 2-3 months to see if it changes. For women in menopause, it is particularly important to monitor a cyst closely because of the higher rate of ovarian cancer in menopausal women. When treatment is needed, it may include any of the following:  · A procedure to drain the cyst (aspiration). This may be done using a long needle and ultrasound. It can also be done through a laparoscopic procedure. This involves using a thin, lighted tube with a tiny camera on the end (laparoscope) inserted through a small incision.  · Surgery to remove the whole cyst. This may be done using laparoscopic surgery or an open surgery involving a larger incision in the lower abdomen.  · Hormone treatment or birth control pills. These methods are sometimes used to help dissolve a cyst.  HOME CARE INSTRUCTIONS   · Only take over-the-counter or prescription medicines as directed by your health care provider.  · Follow up with your health care provider as directed.  · Get regular pelvic exams and Pap tests.  SEEK MEDICAL CARE IF:   · Your periods are late, irregular, or painful, or they stop.  · Your pelvic pain or abdominal pain does not go away.  · Your abdomen becomes  larger or swollen.  · You have pressure on your bladder or trouble emptying your bladder completely.  · You have pain during sexual intercourse.  · You have feelings of fullness, pressure, or discomfort in your stomach.  · You lose weight for no apparent reason.  · You feel generally ill.  · You become constipated.  · You lose your appetite.  · You develop acne.  · You have an increase in body and facial hair.  · You are gaining weight, without changing your exercise and eating habits.  · You think you are pregnant.  SEEK IMMEDIATE MEDICAL CARE IF:   · You have increasing abdominal pain.  · You feel sick to your stomach (nauseous), and you throw up (vomit).  · You develop a fever that comes on suddenly.  · You have abdominal pain during a bowel movement.  · Your menstrual periods become heavier than usual.  MAKE SURE YOU:  · Understand these instructions.  · Will watch your condition.  · Will get help right away if you are not doing well or get worse.     This information is not intended to replace advice given to you by your health care provider. Make sure you discuss any questions you have with your health care provider.     Document Released: 12/18/2006 Document Revised: 12/23/2014 Document Reviewed: 08/25/2014  [employer] Interactive Patient Education ©2016 [employer]

## 2017-10-12 NOTE — PROGRESS NOTES
Placed discharge outreach phone call to patient s/p ER discharge 10/11/17.  Left voicemail providing my contact information and instructions to call with any questions or concerns.

## 2018-01-26 ENCOUNTER — NON-PROVIDER VISIT (OUTPATIENT)
Dept: OCCUPATIONAL MEDICINE | Facility: CLINIC | Age: 28
End: 2018-01-26
Payer: COMMERCIAL

## 2018-01-26 ENCOUNTER — HOSPITAL ENCOUNTER (EMERGENCY)
Facility: MEDICAL CENTER | Age: 28
End: 2018-01-26
Attending: EMERGENCY MEDICINE
Payer: COMMERCIAL

## 2018-01-26 VITALS
RESPIRATION RATE: 16 BRPM | HEART RATE: 83 BPM | BODY MASS INDEX: 22.96 KG/M2 | WEIGHT: 134.48 LBS | OXYGEN SATURATION: 97 % | DIASTOLIC BLOOD PRESSURE: 76 MMHG | TEMPERATURE: 98.2 F | HEIGHT: 64 IN | SYSTOLIC BLOOD PRESSURE: 133 MMHG

## 2018-01-26 DIAGNOSIS — Z02.1 PRE-EMPLOYMENT DRUG SCREENING: ICD-10-CM

## 2018-01-26 DIAGNOSIS — M25.531 WRIST PAIN, RIGHT: ICD-10-CM

## 2018-01-26 DIAGNOSIS — Z02.83 ENCOUNTER FOR DRUG SCREENING: ICD-10-CM

## 2018-01-26 DIAGNOSIS — M25.532 WRIST PAIN, ACUTE, LEFT: ICD-10-CM

## 2018-01-26 PROCEDURE — 80305 DRUG TEST PRSMV DIR OPT OBS: CPT | Performed by: INTERNAL MEDICINE

## 2018-01-26 PROCEDURE — 99283 EMERGENCY DEPT VISIT LOW MDM: CPT

## 2018-01-26 PROCEDURE — 82075 ASSAY OF BREATH ETHANOL: CPT | Performed by: INTERNAL MEDICINE

## 2018-01-26 PROCEDURE — 99026 IN-HOSPITAL ON CALL SERVICE: CPT | Performed by: INTERNAL MEDICINE

## 2018-01-26 RX ORDER — MULTIVITAMIN WITH IRON
1 TABLET ORAL DAILY
Status: SHIPPED | COMMUNITY
End: 2018-03-15

## 2018-01-26 ASSESSMENT — PAIN SCALES - GENERAL: PAINLEVEL_OUTOF10: 4

## 2018-01-26 NOTE — LETTER
"  FORM C-4:  EMPLOYEE’S CLAIM FOR COMPENSATION/ REPORT OF INITIAL TREATMENT  EMPLOYEE’S CLAIM - PROVIDE ALL INFORMATION REQUESTED   First Name  Belgica Last Name  Arsenio Birthdate             Age  1990 27 y.o. Sex  female Claim Number   Home Employee Address  [address] [facility]                                     Zip  [address] Height  1.626 m (5' 4\") Weight  61 kg (134 lb 7.7 oz) [facility]     Mailing Employee Address                           [address]               Zip  [address] Telephone  [phone] (home)  Primary Language Spoken  ENGLISH   Insurer  [facility] Third Party   WORKERS CHOICE Employee's Occupation (Job Title) When Injury or Occupational Disease Occurred  [employer]   Employer's Name  [employer] Telephone  [phone]    Employer Address  [address]  [facility] [address]] Zip  [address]   Date of Injury  1/26/2018       Hour of Injury  7:55 PM Date Employer Notified   01/26/2018 Last Day of Work after Injury or Occupational Disease  1/26/2018 Supervisor to Whom Injury Reported  Lisette Cole   Address or Location of Accident (if applicable)  [[address] [facility] [facility] [address]]   What were you doing at the time of accident? (if applicable)  Lifting a tray off rack    How did this injury or occupational disease occur? Be specific and answer in detail. Use additional sheet if necessary)  I was lifting the polarstem inst. tray off of the rack in autoclave to transfer onto a cart  to bring upstairs.    If you believe that you have an occupational disease, when did you first have knowledge of the disability and it relationship to your employment?  N/A Witnesses to the Accident  [facility]     Nature of Injury or Occupational Disease  Workers' Compensation  Part(s) of Body Injured or Affected  Wrist (L) and Hand (L), Wrist (R) and Hand (R), N/A    I certify that the above is true and correct to the best " of my knowledge and that I have provided this information in order to obtain the benefits of [place] inclusive or Chapter 617 of [place]RS).  I hereby authorize any physician, chiropractor, surgeon, practitioner, or other person, any hospital, including [facility] or [facility], any medical service organization, any insurance company, or other institution or organization to release to each other, any medical or other information, including benefits paid or payable, pertinent to this injury or disease, except information relative to diagnosis, treatment and/or counseling for AIDS, psychological conditions, alcohol or controlled substances, for which I must give specific authorization.  A Photostat of this authorization shall be as valid as the original.   Date01- [facility]    Employee’s Signature   THIS REPORT MUST BE COMPLETED AND MAILED WITHIN 3 WORKING DAYS OF TREATMENT   Place  [facility], EMERGENCY DEPT  Name of Facility   [facility]   Date  1/26/2018 Diagnosis  (M25.532) Wrist pain, acute, left  (M25.531) Wrist pain, right Is there evidence the injured employee was under the influence of alcohol and/or another controlled substance at the time of accident?   Hour  9:38 PM Description of Injury or Disease  Wrist pain, acute, left  Wrist pain, right No   Treatment  Referral to occupation health.  Have you advised the patient to remain off work five days or more?         No   X-Ray Findings      If Yes   From Date    To Date      From information given by the employee, together with medical evidence, can you directly connect this injury or occupational disease as job incurred?  Yes If No, is the employee capable of: Full Duty  No Modified Duty  Yes   Is additional medical care by a physician indicated?  Yes  Comments:Occupational health If Modified Duty, Specify  "any Limitations / Restrictions  No lifting with upper extremities until cleared by occupational health     Do you know of any previous injury or disease contributing to this condition or occupational disease?  No   Date  1/26/2018 Print Doctor’s Name  Keegan Renae certify the employer’s copy of this form was mailed on:   Address  [address] [place] [id number] [phone] Insurer’s Use Only   [facility]  [address]    Provider’s Tax ID Number   [id number] Telephone  Dept: [phone]    Doctor’s Signature  e-KEEGAN Trimble M.D. Degree   MD    Original - TREATING PHYSICIAN OR CHIROPRACTOR   Pg 2-Insurer/TPA   Pg 3-Employer   Pg 4-Employee                                                                                                  Form C-4 (rev01/03)     BRIEF DESCRIPTION OF RIGHTS AND BENEFITS  (Pursuant to NRS 616C.050)    Notice of Injury or Occupational Disease (Incident Report Form C-1): If an injury or occupational disease (OD) arises out of and in the course of employment, you must provide written notice to your employer as soon as practicable, but no later than 7 days after the accident or OD. Your employer shall maintain a sufficient supply of the required forms.    Claim for Compensation (Form C-4): If medical treatment is sought, the form C-4 is available at the place of initial treatment. A completed \"Claim for Compensation\" (Form C-4) must be filed within 90 days after an accident or OD. The treating physician or chiropractor must, within 3 working days after treatment, complete and mail to the employer, the employer's insurer and third-party , the Claim for Compensation.    Medical Treatment: If you require medical treatment for your on-the-job injury or OD, you may be required to select a physician or chiropractor from a list provided by your workers’ compensation insurer, if it has contracted with an Organization for Managed Care (MCO) or " Preferred Provider Organization (PPO) or providers of health care. If your employer has not entered into a contract with an MCO or PPO, you may select a physician or chiropractor from the Panel of Physicians and Chiropractors. Any medical costs related to your industrial injury or OD will be paid by your insurer.    Temporary Total Disability (TTD): If your doctor has certified that you are unable to work for a period of at least 5 consecutive days, or 5 cumulative days in a 20-day period, or places restrictions on you that your employer does not accommodate, you may be entitled to TTD compensation.    Temporary Partial Disability (TPD): If the wage you receive upon reemployment is less than the compensation for TTD to which you are entitled, the insurer may be required to pay you TPD compensation to make up the difference. TPD can only be paid for a maximum of 24 months.    Permanent Partial Disability (PPD): When your medical condition is stable and there is an indication of a PPD as a result of your injury or OD, within 30 days, your insurer must arrange for an evaluation by a rating physician or chiropractor to determine the degree of your PPD. The amount of your PPD award depends on the date of injury, the results of the PPD evaluation and your age and wage.    Permanent Total Disability (PTD): If you are medically certified by a treating physician or chiropractor as permanently and totally disabled and have been granted a PTD status by your insurer, you are entitled to receive monthly benefits not to exceed 66 2/3% of your average monthly wage. The amount of your PTD payments is subject to reduction if you previously received a PPD award.    Vocational Rehabilitation Services: You may be eligible for vocational rehabilitation services if you are unable to return to the job due to a permanent physical impairment or permanent restrictions as a result of your injury or occupational disease.    Transportation and  Per Srinivasan Reimbursement: You may be eligible for travel expenses and per srinivasan associated with medical treatment.  Reopening: You may be able to reopen your claim if your condition worsens after claim closure.    Appeal Process: If you disagree with a written determination issued by the insurer or the insurer does not respond to your request, you may appeal to the Department of Administration, , by following the instructions contained in your determination letter. You must appeal the determination within 70 days from the date of the determination letter at [address], [address], [place], [place] [address], or [address], [address], [place], [place] [address]. If you disagree with the  decision, you may appeal to the Department of Administration, . You must file your appeal within 30 days from the date of the  decision letter at [address], [address], [place], [place] [address], or [address], [place], [place] [address]. If you disagree with a decision of an , you may file a petition for judicial review with the District Court. You must do so within 30 days of the Appeal Officer’s decision. You may be represented by an  at your own expense or you may contact the [facility] for possible representation.    [place]  for Injured Workers (NAIW): If you disagree with a  decision, you may request that NAIW represent you without charge at an  Hearing. For information regarding denial of benefits, you may contact the [facility] at: [address], [address], [place] [address], [phone], or [address], [place] [address], [phone]    To File a Complaint with the Division: If you wish to file a complaint with the  of the Division of Industrial Relations (DIR), please contact the Workers’ Compensation Section, [facility]  [facility] [address], [address], [place], [place] [address], telephone ([phone]) [phone], or [address], [address], [place], [place] [address], telephone [phone].    For assistance with Workers’ Compensation Issues: you may contact the Office of the [facility] Consumer Health Assistance, [address] [place] [address], [address], [place], [place] [address], Toll Free 1-645.689.9393, Web site: http://govcha.[facility].nv., E-mail [email or web address].                                                                                                                                                                               __________________________________________________________________                                    _________________            Employee Name / Signature                                                                                                                            Date                                       D-2 (rev. 10/07)

## 2018-01-26 NOTE — LETTER
"  FORM C-4:  EMPLOYEE’S CLAIM FOR COMPENSATION/ REPORT OF INITIAL TREATMENT  EMPLOYEE’S CLAIM - PROVIDE ALL INFORMATION REQUESTED   First Name  Belgica Last Name  Arsenio Birthdate             Age  1990 27 y.o. 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 [facility] [address]]   What were you doing at the time of accident? (if applicable)  Lifting a tray off rack    How did this injury or occupational disease occur? Be specific and answer in detail. Use additional sheet if necessary)  I was lifting the polarstem inst. tray off of the rack in autoclave to transfer onto a cart  to bring upstairs.    If you believe that you have an occupational disease, when did you first have knowledge of the disability and it relationship to your employment?  N/A Witnesses to the Accident  [facility]     Nature of Injury or Occupational Disease  Workers' Compensation  Part(s) of Body Injured or Affected  Wrist (L) and Hand (L), Wrist (R) and Hand (R), N/A    I certify that the above is true and correct to the best of my knowledge and that " I have provided this information in order to obtain the benefits of [place]’s [place] and [place] ([place] 616A to [place]D, inclusive or Chapter 617 of [place]RS).  I hereby authorize any physician, chiropractor, surgeon, practitioner, or other person, any hospital, including [facility] hospital, any medical service organization, any insurance company, or other institution or organization to release to each other, any medical or other information, including benefits paid or payable, pertinent to this injury or disease, except information relative to diagnosis, treatment and/or counseling for AIDS, psychological conditions, alcohol or controlled substances, for which I must give specific authorization.  A Photostat of this authorization shall be as valid as the original.   Date Place   Employee’s Signature   THIS REPORT MUST BE COMPLETED AND MAILED WITHIN 3 WORKING DAYS OF TREATMENT   Place  [facility], EMERGENCY DEPT  Name of Facility   [facility]   Date  1/26/2018 Diagnosis  (M25.532) Wrist pain, acute, left  (M25.531) Wrist pain, right Is there evidence the injured employee was under the influence of alcohol and/or another controlled substance at the time of accident?   Hour  9:37 PM Description of Injury or Disease  Wrist pain, acute, left  Wrist pain, right     Treatment     Have you advised the patient to remain off work five days or more?             X-Ray Findings      If Yes   From Date    To Date      From information given by the employee, together with medical evidence, can you directly connect this injury or occupational disease as job incurred?    If No, is the employee capable of: Full Duty    Modified Duty      Is additional medical care by a physician indicated?    If Modified Duty, Specify any Limitations / Restrictions        Do you know of any previous injury or disease contributing to this condition or  "occupational disease?      Date  1/26/2018 Print Doctor’s Name  Keegan Renae ISRRAEL certify the employer’s copy of this form was mailed on:   Address  [address] [place] [address] [phone] Insurer’s Use Only   [facility] Zip  [address]    Provider’s Tax ID Number    Telephone  Dept: [phone]    Doctor’s Signature    Degree       Original - TREATING PHYSICIAN OR CHIROPRACTOR   Pg 2-Insurer/TPA   Pg 3-Employer   Pg 4-Employee                                                                                                  Form C-4 (rev01/03)     BRIEF DESCRIPTION OF RIGHTS AND BENEFITS  (Pursuant to [place]RS 616C.050)    Notice of Injury or Occupational Disease (Incident Report Form C-1): If an injury or occupational disease (OD) arises out of and in the course of employment, you must provide written notice to your employer as soon as practicable, but no later than 7 days after the accident or OD. Your employer shall maintain a sufficient supply of the required forms.    Claim for Compensation (Form C-4): If medical treatment is sought, the form C-4 is available at the place of initial treatment. A completed \"Claim for Compensation\" (Form C-4) must be filed within 90 days after an accident or OD. The treating physician or chiropractor must, within 3 working days after treatment, complete and mail to the employer, the employer's insurer and third-party , the Claim for Compensation.    Medical Treatment: If you require medical treatment for your on-the-job injury or OD, you may be required to select a physician or chiropractor from a list provided by your workers’ compensation insurer, if it has contracted with an Organization for Managed Care (MCO) or Preferred Provider Organization (PPO) or providers of health care. If your employer has not entered into a contract with an MCO or PPO, you may select a physician or chiropractor from the Panel of Physicians and Chiropractors. Any " medical costs related to your industrial injury or OD will be paid by your insurer.    Temporary Total Disability (TTD): If your doctor has certified that you are unable to work for a period of at least 5 consecutive days, or 5 cumulative days in a 20-day period, or places restrictions on you that your employer does not accommodate, you may be entitled to TTD compensation.    Temporary Partial Disability (TPD): If the wage you receive upon reemployment is less than the compensation for TTD to which you are entitled, the insurer may be required to pay you TPD compensation to make up the difference. TPD can only be paid for a maximum of 24 months.    Permanent Partial Disability (PPD): When your medical condition is stable and there is an indication of a PPD as a result of your injury or OD, within 30 days, your insurer must arrange for an evaluation by a rating physician or chiropractor to determine the degree of your PPD. The amount of your PPD award depends on the date of injury, the results of the PPD evaluation and your age and wage.    Permanent Total Disability (PTD): If you are medically certified by a treating physician or chiropractor as permanently and totally disabled and have been granted a PTD status by your insurer, you are entitled to receive monthly benefits not to exceed 66 2/3% of your average monthly wage. The amount of your PTD payments is subject to reduction if you previously received a PPD award.    Vocational Rehabilitation Services: You may be eligible for vocational rehabilitation services if you are unable to return to the job due to a permanent physical impairment or permanent restrictions as a result of your injury or occupational disease.    Transportation and Per Srinivasan Reimbursement: You may be eligible for travel expenses and per srinivasan associated with medical treatment.  Reopening: You may be able to reopen your claim if your condition worsens after claim closure.    Appeal Process: If  you disagree with a written determination issued by the insurer or the insurer does not respond to your request, you may appeal to the Department of Administration, , by following the instructions contained in your determination letter. You must appeal the determination within 70 days from the date of the determination letter at [address], [address], [place], [place] [address], or [address], [address], [place], [place] [address]. If you disagree with the  decision, you may appeal to the Department of Administration, . You must file your appeal within 30 days from the date of the  decision letter at [address], [address], [place], [place] [address], or [address], [facility] [address], [place], [place] [address]. If you disagree with a decision of an , you may file a petition for judicial review with the District Court. You must do so within 30 days of the Appeal Officer’s decision. You may be represented by an  at your own expense or you may contact the [facility] for possible representation.    [place]  for Injured Workers ([place]IW): If you disagree with a  decision, you may request that NAIW represent you without charge at an  Hearing. For information regarding denial of benefits, you may contact the [facility] at: [address], [address], [place] [address], [phone], or [address], [address], [place] [address], [phone]    To File a Complaint with the Division: If you wish to file a complaint with the  of the Division of Industrial Relations (DIR), please contact the Workers’ Compensation Section, [address], [address], [place], [place] [address], telephone (605) [phone], or [address] [facility] [address], [place] [address], telephone [phone].    For assistance with Workers’ Compensation Issues: you  may contact the Office of the Governor Consumer Health Assistance, [address] [place] [address], [facility] [facility] [address], [place] [address], Toll Free 1-406.554.6693, Web site: http://govcha.[facility].[place], E-mail [email or web address].                                                                                                                                                                               __________________________________________________________________                                    _________________            Employee Name / Signature                                                                                                                            Date                                       D-2 (rev. 10/07)

## 2018-01-27 ENCOUNTER — PATIENT OUTREACH (OUTPATIENT)
Dept: HEALTH INFORMATION MANAGEMENT | Facility: OTHER | Age: 28
End: 2018-01-27

## 2018-01-27 NOTE — ED NOTES
Results noted by erp-pt for d/c, f/u with occ health. Rest, ice and elevate with return for worsening s/s

## 2018-01-27 NOTE — ED PROVIDER NOTES
"CHIEF COMPLAINT  Chief Complaint   Patient presents with   • Wrist Injury     Bilat SIIS- [employer] employee Bilat wrist pain S/P lifting a heavy tray at 1955       [facility]  Belgica Cesar is a 27 y.o. female who presents with bilateral wrist pain status post lifting a heavy tray earlier this evening. The patient has not taken any medication for this. The patient says it is localized to the ulnar aspect of her bilateral wrists. She denies any numbness or tingling distal to the wound. She says that she is not weak but squeezing in her hands causes pain. She denies any direct blow to either of her wrist. The patient denies hitting her elbows or any other part of her body. She denies any chest pain shortness of breath or any head pains.    REVIEW OF SYSTEMS  See [facility] for further details. All other systems are negative.     PAST MEDICAL HISTORY   has a past medical history of Migraines.    SOCIAL HISTORY  Social History     Social History Main Topics   • Smoking status: Never Smoker   • Smokeless tobacco: Never Used   • Alcohol use No   • Drug use: No   • Sexual activity: Not on file       SURGICAL HISTORY  patient denies any surgical history    CURRENT MEDICATIONS  Home Medications     Reviewed by Virgie Fitzgerald R.N. (Registered Nurse) on 01/26/18 at 2101  Med List Status: Complete   Medication Last Dose Status   Desogestrel-Ethinyl Estradiol (APRI PO) 1/25/2018 Active   Magnesium 250 MG Tab 1/26/2018 Active                ALLERGIES  Allergies   Allergen Reactions   • Pcn [Penicillins] Hives       PHYSICAL EXAM  VITAL SIGNS: /65   Pulse 78   Temp 37.2 °C (98.9 °F)   Resp 14   Ht 1.626 m (5' 4\")   Wt 61 kg (134 lb 7.7 oz)   SpO2 98%   BMI 23.08 kg/m²  @[id number]::@  Pulse ox interpretation: I interpret this pulse ox as normal.  Constitutional: Alert in no apparent distress.  HENT: Normocephalic, Atraumatic, Bilateral external ears normal. Nose normal.   Eyes: Pupils are equal and reactive. Conjunctiva normal, " non-icteric.   Heart: Regular rate and rythm, no murmurs.    Lungs: Clear to auscultation bilaterally.  Skin: Warm, Dry, No erythema, No rash.   Neurologic: Alert, Grossly non-focal.   Psychiatric: Affect normal, Judgment normal, Mood normal, Appears appropriate and not intoxicated.   Msk: The patient has tenderness on the ulnar aspect of bilateral wrists but she has no bony tenderness. She has no snuffbox tenderness. She has no tenderness on axial loading. She has brisk capillary refill and strong radial pulse. Sensation intact to light touch in bilateral upper extremities. The patient has no volar tenderness in her wrist and there are no areas of bony tenderness on either wrist. Patient able to squeeze with bilateral hands but weaker  on the right side secondary to pain        COURSE & MEDICAL DECISION MAKING  Pertinent Labs & Imaging studies reviewed. (See chart for details)    27-year-old female presents with bilateral wrist pain. There is no mechanism to suggest a need for x-rays given there is no striking motion to the wrists. This appears to be ligamentous injury in origin. There is no numbness and only weakness secondary to pain. At this point I believe this is likely a ligament strain. There is no symptoms to suggest carpal tunnel syndrome. At this time I recommended rice therapy and follow-up with her not occupational health. Strict return precautions were given.    The patient will not drink alcohol nor drive with prescribed medications. The patient will return for worsening symptoms and is stable at the time of discharge. The patient verbalizes understanding and will comply.    FINAL IMPRESSION  1. Wrist pain, acute, left    2. Wrist pain, right              Electronically signed by: Keegan Renae, 1/26/2018 9:27 PM

## 2018-01-27 NOTE — DISCHARGE INSTRUCTIONS
RICE for Routine Care of Injuries  The routine care of many injuries includes rest, ice, compression, and elevation (RICE). The RICE strategy is often recommended for injuries to soft tissues, such as a muscle strain, ligament injuries, bruises, and overuse injuries. It can also be used for some bony injuries. Using the RICE strategy can help to relieve pain, lessen swelling, and enable your body to heal.  Rest  Rest is required to allow your body to heal. This usually involves reducing your normal activities and avoiding use of the injured part of your body. Generally, you can return to your normal activities when you are comfortable and have been given permission by your health care provider.  Ice  Icing your injury helps to keep the swelling down, and it lessens pain. Do not apply ice directly to your skin.  · Put ice in a plastic bag.  · Place a towel between your skin and the bag.  · Leave the ice on for 20 minutes, 2-3 times a day.  Do this for as long as you are directed by your health care provider.  Compression  Compression means putting pressure on the injured area. Compression helps to keep swelling down, gives support, and helps with discomfort. Compression may be done with an elastic bandage. If an elastic bandage has been applied, follow these general tips:  · Remove and reapply the bandage every 3-4 hours or as directed by your health care provider.  · Make sure the bandage is not wrapped too tightly, because this can cut off circulation. If part of your body beyond the bandage becomes blue, numb, cold, swollen, or more painful, your bandage is most likely too tight. If this occurs, remove your bandage and reapply it more loosely.  · See your health care provider if the bandage seems to be making your problems worse rather than better.  Elevation  Elevation means keeping the injured area raised. This helps to lessen swelling and decrease pain. If possible, your injured area should be elevated at or  above the level of your heart or the center of your chest.  WHEN SHOULD I SEEK MEDICAL CARE?  You should seek medical care if:  · Your pain and swelling continue.  · Your symptoms are getting worse rather than improving.  These symptoms may indicate that further evaluation or further X-rays are needed. Sometimes, X-rays may not show a small broken bone (fracture) until a number of days later. Make a follow-up appointment with your health care provider.  WHEN SHOULD I SEEK IMMEDIATE MEDICAL CARE?  You should seek immediate medical care if:  · You have sudden severe pain at or below the area of your injury.  · You have redness or increased swelling around your injury.  · You have tingling or numbness at or below the area of your injury that does not improve after you remove the elastic bandage.     This information is not intended to replace advice given to you by your health care provider. Make sure you discuss any questions you have with your health care provider.     Document Released: 04/01/2002 Document Revised: 12/23/2014 Document Reviewed: 11/25/2015  [employer] Interactive Patient Education ©2016 [employer]

## 2018-01-29 NOTE — PROGRESS NOTES
vick was called out after business hours to [facility] for a post accident UDS and BAT on 1/26/18 for [employer].    UDS collected at 9:44 pm.  BAT collected at 21:28.

## 2018-01-31 ENCOUNTER — OCCUPATIONAL MEDICINE (OUTPATIENT)
Dept: OCCUPATIONAL MEDICINE | Facility: CLINIC | Age: 28
End: 2018-01-31
Payer: COMMERCIAL

## 2018-01-31 VITALS
DIASTOLIC BLOOD PRESSURE: 60 MMHG | BODY MASS INDEX: 22.2 KG/M2 | TEMPERATURE: 98.2 F | OXYGEN SATURATION: 63 % | HEIGHT: 64 IN | WEIGHT: 130 LBS | RESPIRATION RATE: 14 BRPM | HEART RATE: 100 BPM | SYSTOLIC BLOOD PRESSURE: 110 MMHG

## 2018-01-31 DIAGNOSIS — S66.912A STRAIN OF WRIST, BILATERAL: ICD-10-CM

## 2018-01-31 DIAGNOSIS — S66.911A STRAIN OF WRIST, BILATERAL: ICD-10-CM

## 2018-01-31 PROCEDURE — 99202 OFFICE O/P NEW SF 15 MIN: CPT | Performed by: PREVENTIVE MEDICINE

## 2018-01-31 ASSESSMENT — PAIN SCALES - GENERAL: PAINLEVEL: 3=SLIGHT PAIN

## 2018-01-31 NOTE — LETTER
[address],   [address] [place] [address]  Phone:  [phone] - Fax:  [phone]   Occupational Health [facility] Progress Report and Disability Certification  Date of Service: 1/31/2018   No Show:  No  Date / Time of Next Visit:   Claim Information   Patient Name: Belgica Cesar  Claim Number:     Employer: [employer]  Date of Injury: 1/26/2018     Insurer / TPA: [employer]  ID / SSN:     Occupation: [employer]  Diagnosis: The encounter diagnosis was Strain of wrist, bilateral.    Medical Information   Related to Industrial Injury? Yes    Subjective Complaints:  Date of injury 1/26/2018. Mechanism of injury-felt pain in both wrists lifting 30 pound tray. 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 She was seen in emergency department 3 days ago. Today, she reports she is much improved. Right wrist pain is resolved. Left wrist pain is intermittent and mild. No numbness. No weakness.   Objective Findings: Appearance: Well-developed, well-nourished.   Mental Status: Mood and Affect normal. Pleasant. Cooperative. Appropriate.   ENT: Oropharynx clear. Moist mucous membranes. Hearing normal.   Eyes: Pupils reactive. Conjunctiva normal. No scleral icterus.   Neck: Trachea Midline. No thyromegaly. No masses.  Cardiovascular: Normal rate. Regular rhythm. Normal heart sounds.   Chest: Effort normal. Breath sounds clear.   Skin: Skin is warm and dry. No rash.   Musculoskeletal: Bilateral wrist show no swelling, ecchymosis, heat. No tenderness ulnar or radial. Good  strength. Good resisted extension and flexion.     Pre-Existing Condition(s):     Assessment:   Condition Improved    Status: Discharged /  MMI  Permanent Disability:No    Plan:      Diagnostics:      Comments:       Disability Information   Status: Released to Full Duty    From:  1/31/2018  Through:   Restrictions are:     Physical Restrictions   Sitting:    Standing:    Stooping:     Bending:      Squatting:    Walking:    Climbing:    Pushing:      Pulling:    Other:    Reaching Above Shoulder (L):   Reaching Above Shoulder (R):       Reaching Below Shoulder (L):    Reaching Below Shoulder (R):      Not to exceed Weight Limits   Carrying(hrs):   Weight Limit(lb):   Lifting(hrs):   Weight  Limit(lb):     Comments:      Repetitive Actions   Hands: i.e. Fine Manipulations from Grasping:     Feet: i.e. Operating Foot Controls:     Driving / Operate Machinery:     Physician Name: Stefan Arroyo M.D. Physician Signature: STEFAN Herron M.D. e-Signature: Dr. Ras Wilde, Medical Director   Clinic Name / Location: [address],   [address] [place] [address] Clinic Phone Number: Dept: [phone]   Appointment Time: 3:30 Pm Visit Start Time: 3:38 PM   Check-In Time:  3:29 Pm Visit Discharge Time:  4:04 PM    Original-Treating Physician or Chiropractor    Page 2-Insurer/TPA    Page 3-Employer    Page 4-Employee

## 2018-02-01 NOTE — PROGRESS NOTES
"Subjective:      Belgica Cesar is a 27 y.o. female who presents with Follow-Up ([facility] DOI 1/26/2018 - R/L wrist - better - room 2)      Date of injury 1/26/2018. Mechanism of injury-felt pain in both wrists lifting 30 pound tray. 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 She was seen in emergency department 3 days ago. Today, she reports she is much improved. Right wrist pain is resolved. Left wrist pain is intermittent and mild. No numbness. No weakness.     HPI    ROS  Comprehensive medical history form reviewed. Pertinent positives and negatives included in HPI.    PFSH: reviewed in Epic    PMH:  has a past medical history of Migraines. She also has no past medical history of Asthma; CAD (coronary artery disease); Cancer (CMS-[facility]); Chronic obstructive pulmonary disease (CMS-[facility]); Congestive heart failure (CMS-HCC); Diabetes (CMS-HCC); Hypertension; Infectious disease; Liver disease; Psychiatric disorder; Renal disorder; Seizure disorder (CMS-[facility]); or Stroke (CMS-HCC).  MEDS:   Current Outpatient Prescriptions:   •  Magnesium 250 MG Tab, Take 1 Tab by mouth every day., Disp: , Rfl:   •  Desogestrel-Ethinyl Estradiol (APRI PO), Take  by mouth every bedtime., Disp: , Rfl:   ALLERGIES:   Allergies   Allergen Reactions   • Pcn [Penicillins] Hives     SURGHX: No past surgical history on file.  SOCHX:  reports that she has never smoked. She has never used smokeless tobacco. She reports that she does not drink alcohol or use drugs.  Work Status: Works for [employer] in [employer]  FH: No pertinent hereditary disorders.        Objective:     /60   Pulse 100   Temp 36.8 °C (98.2 °F)   Resp 14   Ht 1.626 m (5' 4\")   Wt 59 kg (130 lb)   SpO2 (!) 63%   BMI 22.31 kg/m²      Physical Exam    Appearance: Well-developed, well-nourished.   Mental Status: Mood and Affect normal. Pleasant. Cooperative. Appropriate.   ENT: Oropharynx clear. Moist mucous membranes. Hearing normal.   Eyes: Pupils " reactive. Conjunctiva normal. No scleral icterus.   Neck: Trachea Midline. No thyromegaly. No masses.  Cardiovascular: Normal rate. Regular rhythm. Normal heart sounds.   Chest: Effort normal. Breath sounds clear.   Skin: Skin is warm and dry. No rash.   Musculoskeletal: Bilateral wrist show no swelling, ecchymosis, heat. No tenderness ulnar or radial. Good  strength. Good resisted extension and flexion.         Assessment/Plan:     1. Strain of wrist, bilateral  New to Occupational Health from emergency department   Condition much improved  Regular work  Release from care

## 2018-03-15 ENCOUNTER — OFFICE VISIT (OUTPATIENT)
Dept: MEDICAL GROUP | Facility: PHYSICIAN GROUP | Age: 28
End: 2018-03-15
Payer: COMMERCIAL

## 2018-03-15 ENCOUNTER — HOSPITAL ENCOUNTER (OUTPATIENT)
Dept: LAB | Facility: MEDICAL CENTER | Age: 28
End: 2018-03-15
Attending: NURSE PRACTITIONER
Payer: COMMERCIAL

## 2018-03-15 VITALS
DIASTOLIC BLOOD PRESSURE: 62 MMHG | TEMPERATURE: 98.4 F | HEART RATE: 69 BPM | SYSTOLIC BLOOD PRESSURE: 96 MMHG | HEIGHT: 64 IN | WEIGHT: 130 LBS | OXYGEN SATURATION: 97 % | BODY MASS INDEX: 22.2 KG/M2

## 2018-03-15 DIAGNOSIS — G43.009 MIGRAINE WITHOUT AURA AND WITHOUT STATUS MIGRAINOSUS, NOT INTRACTABLE: ICD-10-CM

## 2018-03-15 DIAGNOSIS — Z82.49 FAMILY HISTORY OF CEREBRAL ANEURYSM: ICD-10-CM

## 2018-03-15 DIAGNOSIS — J45.20 MILD INTERMITTENT ASTHMA WITHOUT COMPLICATION: ICD-10-CM

## 2018-03-15 DIAGNOSIS — Z87.11 HISTORY OF STOMACH ULCERS: ICD-10-CM

## 2018-03-15 LAB
ALBUMIN SERPL BCP-MCNC: 4.1 G/DL (ref 3.2–4.9)
ALBUMIN/GLOB SERPL: 1.5 G/DL
ALP SERPL-CCNC: 76 U/L (ref 30–99)
ALT SERPL-CCNC: 14 U/L (ref 2–50)
ANION GAP SERPL CALC-SCNC: 7 MMOL/L (ref 0–11.9)
AST SERPL-CCNC: 20 U/L (ref 12–45)
BILIRUB SERPL-MCNC: 0.7 MG/DL (ref 0.1–1.5)
BUN SERPL-MCNC: 12 MG/DL (ref 8–22)
CALCIUM SERPL-MCNC: 9.6 MG/DL (ref 8.5–10.5)
CHLORIDE SERPL-SCNC: 107 MMOL/L (ref 96–112)
CO2 SERPL-SCNC: 25 MMOL/L (ref 20–33)
CREAT SERPL-MCNC: 0.88 MG/DL (ref 0.5–1.4)
GLOBULIN SER CALC-MCNC: 2.8 G/DL (ref 1.9–3.5)
GLUCOSE SERPL-MCNC: 75 MG/DL (ref 65–99)
POTASSIUM SERPL-SCNC: 4.1 MMOL/L (ref 3.6–5.5)
PROT SERPL-MCNC: 6.9 G/DL (ref 6–8.2)
SODIUM SERPL-SCNC: 139 MMOL/L (ref 135–145)

## 2018-03-15 PROCEDURE — 86003 ALLG SPEC IGE CRUDE XTRC EA: CPT | Mod: 91

## 2018-03-15 PROCEDURE — 99214 OFFICE O/P EST MOD 30 MIN: CPT | Performed by: NURSE PRACTITIONER

## 2018-03-15 PROCEDURE — 36415 COLL VENOUS BLD VENIPUNCTURE: CPT

## 2018-03-15 PROCEDURE — 82785 ASSAY OF IGE: CPT

## 2018-03-15 PROCEDURE — 80053 COMPREHEN METABOLIC PANEL: CPT

## 2018-03-15 ASSESSMENT — PAIN SCALES - GENERAL: PAINLEVEL: NO PAIN

## 2018-03-15 ASSESSMENT — PATIENT HEALTH QUESTIONNAIRE - PHQ9: CLINICAL INTERPRETATION OF PHQ2 SCORE: 0

## 2018-03-15 NOTE — PROGRESS NOTES
Belgica Cesar is a 27 y.o. female here today to establish care and for evaluation and management of:    HPI:    Migraine without aura and without status migrainosus, not intractable  Not on any medications for preventative.  Takes excedrin prn.  Recently got new glass and is on BCP and this has helped this.  Last excedrin was last week.     Mild intermittent asthma without complication  Uses occasional albuterol during crossfit .     History of stomach ulcers  Reporting some IBS with diarrhea and constipation. Has tried fiber and minimal (kombacho occasionally) probiotics. Has not tried omeprazole. No fever, chills.  Positive reports of bloating and constipation.       Current medicines (including changes today)  Current Outpatient Prescriptions   Medication Sig Dispense Refill   • Desogestrel-Ethinyl Estradiol (APRI PO) Take  by mouth every bedtime.       No current facility-administered medications for this visit.        She  has a past medical history of Asthma and Migraines. She also has no past medical history of CAD (coronary artery disease); Cancer (CMS-HCC); Chronic obstructive pulmonary disease (CMS-HCC); Congestive heart failure (CMS-HCC); Diabetes (CMS-HCC); Hypertension; Infectious disease; Liver disease; Psychiatric disorder; Renal disorder; Seizure disorder (CMS-HCC); or Stroke (CMS-HCC).    She  has no past surgical history on file.    Social History   Substance Use Topics   • Smoking status: Never Smoker   • Smokeless tobacco: Never Used   • Alcohol use No       Social History     Social History Narrative   • No narrative on file       Family History   Problem Relation Age of Onset   • Hypertension Mother    • No Known Problems Father        Family Status   Relation Status   • Mother Alive   • Father          ROS  As stated in hpi.  Patient reports family history of cerebral aneurysm and would like CT ro tule out.  Worried since she has hixtory of migraines.   All other systems reviewed and are  "negative     Objective:     Blood pressure (!) 96/62, pulse 69, temperature 36.9 °C (98.4 °F), height 1.626 m (5' 4\"), weight 59 kg (130 lb), SpO2 97 %. Body mass index is 22.31 kg/m².  Physical Exam:    Constitutional: Alert, no distress.  Skin: Warm, dry, good turgor, no rashes in visible areas.  Eye: Equal, round and reactive, conjunctiva clear, lids normal.  ENMT: Lips without lesions, good dentition, oropharynx clear.  Neck: Trachea midline, no masses, no thyromegaly. No cervical or supraclavicular lymphadenopathy.  Respiratory: Unlabored respiratory effort, lungs clear to auscultation, no wheezes, no ronchi.  Cardiovascular: Normal S1, S2, no murmur, no edema.  Abdomen: Soft, non-tender, no masses, no hepatosplenomegaly.  Psych: Alert and oriented x3, normal affect and mood.        Assessment and Plan:   The following treatment plan was discussed    1. Migraine without aura and without status migrainosus, not intractable  This is any problem to me. Chronic. Ongoing issue. Stable at this time. Has improved with this control pill and new glasses. Excedrin when necessary. Patient is very concerned about family history of cerebral aneurysm and would like a CT scan. Order placed. Monitor and follow.    2. Mild intermittent asthma without complication  This is a new problem to me. Chronic. Stable at this time. Recommended that patient try using a puff of her inhaler prior to a intense workout to avoid the inflammation and needing to use the rescue inhaler during her exercise. She will try this monitor and follow.    3. Family history of cerebral aneurysm  See problem #1.  - CT-HEAD W/O; Future  - COMP METABOLIC PANEL; Future    4. History of stomach ulcers  This is a new problem to me. Chronic. Ongoing issue. Her symptoms most likely represents irritable bowel syndrome with intermittent constipation and diarrhea. Suggested that patient work on increasing fiber in her food intake. Recommended she try a two-week trial " of omeprazole or Nexium over-the-counter to see if this improves. If this doesn't my recommendation would be to try 30 D trial of a probiotic and take it daily. We'll also check for any BASIC allergens and food as this may be causing some of the bloating. Monitor and follow results.  - ALLERGEN PROFILE, BASIC FOOD  - ALLERGY ZONE 15      Records requested.  Followup: Return in about 1 year (around 3/15/2019).

## 2018-03-15 NOTE — ASSESSMENT & PLAN NOTE
Reporting some IBS with diarrhea and constipation. Has tried fiber and minimal (kombacho occasionally) probiotics. Has not tried omeprazole. No fever, chills.  Positive reports of bloating and constipation.

## 2018-03-15 NOTE — ASSESSMENT & PLAN NOTE
Not on any medications for preventative.  Takes excedrin prn.  Recently got new glass and is on BCP and this has helped this.  Last excedrin was last week.

## 2018-03-17 LAB
A ALTERNATA IGE QN: <0.1 KU/L
A FUMIGATUS IGE QN: <0.1 KU/L
ALMOND IGE QN: <0.1 KU/L
AVOCADO IGE QN: <0.1 KU/L
BANANA IGE QN: <0.1 KU/L
BERMUDA GRASS IGE QN: <0.1 KU/L
BOXELDER IGE QN: <0.1 KU/L
C SPHAEROSPERMUM IGE QN: <0.1 KU/L
CAT DANDER IGE QN: <0.1 KU/L
CELERY IGE QN: <0.1 KU/L
CHESTNUT IGE QN: <0.1 KU/L
CMN PIGWEED IGE QN: <0.1 KU/L
COCONUT IGE QN: <0.1 KU/L
COMMON RAGWEED IGE QN: <0.1 KU/L
COTTONWOOD IGE QN: <0.1 KU/L
COW MILK IGE QN: <0.1 KU/L
D FARINAE IGE QN: <0.1 KU/L
D PTERONYSS IGE QN: <0.1 KU/L
DEPRECATED MISC ALLERGEN IGE RAST QL: NORMAL
DOG DANDER IGE QN: <0.1 KU/L
EGG WHITE IGE QN: 0.1 KU/L
GRAPE IGE QN: <0.1 KU/L
IGE SERPL-ACNC: 6 KU/L
KIWIFRUIT IGE QN: <0.1 KU/L
M RACEMOSUS IGE QN: <0.1 KU/L
MOUSE EPITH IGE QN: <0.1 KU/L
MT JUNIPER IGE QN: <0.1 KU/L
MUGWORT IGE QN: <0.1 KU/L
OAT IGE QN: <0.1 KU/L
OLIVE POLN IGE QN: <0.1 KU/L
P NOTATUM IGE QN: <0.1 KU/L
PAPAYA IGE QN: <0.1 KU/L
PEANUT IGE QN: <0.1 KU/L
PECAN/HICK NUT IGE QN: <0.1 KU/L
POTATO IGE QN: <0.1 KU/L
ROACH IGE QN: <0.1 KU/L
SALTWORT IGE QN: <0.1 KU/L
SESAME SEED IGE QN: <0.1 KU/L
SOYBEAN IGE QN: <0.1 KU/L
TIMOTHY IGE QN: <0.1 KU/L
TOMATO IGE QN: <0.1 KU/L
WATERMELON IGE QN: <0.1 KU/L
WHEAT IGE QN: <0.1 KU/L
WHITE ELM IGE QN: <0.1 KU/L
WHITE MULBERRY IGE QN: <0.1 KU/L
WHITE OAK IGE QN: <0.1 KU/L

## 2018-04-04 ENCOUNTER — APPOINTMENT (OUTPATIENT)
Dept: RADIOLOGY | Facility: IMAGING CENTER | Age: 28
End: 2018-04-04
Attending: PREVENTIVE MEDICINE
Payer: COMMERCIAL

## 2018-04-04 ENCOUNTER — OCCUPATIONAL MEDICINE (OUTPATIENT)
Dept: OCCUPATIONAL MEDICINE | Facility: CLINIC | Age: 28
End: 2018-04-04
Payer: COMMERCIAL

## 2018-04-04 VITALS
DIASTOLIC BLOOD PRESSURE: 68 MMHG | RESPIRATION RATE: 14 BRPM | WEIGHT: 132 LBS | TEMPERATURE: 98 F | SYSTOLIC BLOOD PRESSURE: 100 MMHG | HEIGHT: 64 IN | OXYGEN SATURATION: 99 % | HEART RATE: 86 BPM | BODY MASS INDEX: 22.53 KG/M2

## 2018-04-04 DIAGNOSIS — S66.912A STRAIN OF WRIST, BILATERAL: ICD-10-CM

## 2018-04-04 DIAGNOSIS — S66.911A STRAIN OF WRIST, BILATERAL: ICD-10-CM

## 2018-04-04 DIAGNOSIS — M25.532 LEFT WRIST PAIN: ICD-10-CM

## 2018-04-04 PROCEDURE — 73110 X-RAY EXAM OF WRIST: CPT | Mod: 26,LT | Performed by: PREVENTIVE MEDICINE

## 2018-04-04 PROCEDURE — 99214 OFFICE O/P EST MOD 30 MIN: CPT | Performed by: PREVENTIVE MEDICINE

## 2018-04-04 RX ORDER — PREDNISONE 20 MG/1
20 TABLET ORAL 2 TIMES DAILY
Qty: 14 TAB | Refills: 0 | Status: SHIPPED | OUTPATIENT
Start: 2018-04-04 | End: 2018-04-19

## 2018-04-04 ASSESSMENT — ENCOUNTER SYMPTOMS
NEUROLOGICAL NEGATIVE: 1
CONSTITUTIONAL NEGATIVE: 1

## 2018-04-04 NOTE — LETTER
[address],   [address] [place] [address]  Phone:  [phone] - Fax:  [phone]   [facility] Progress Report and Disability Certification  Date of Service: 4/4/2018   No Show:  No  Date / Time of Next Visit: 5/3/2018@9:45am   Claim Information   Patient Name: Belgica Cesar  Claim Number:     Employer: [employer]  Date of Injury: 1/26/2018     Insurer / TPA: Workers [employer]  ID / SSN:     Occupation: [employer]  Diagnosis: Diagnoses of Strain of wrist, bilateral and Left wrist pain were pertinent to this visit.    Medical Information   Related to Industrial Injury?   Comments:Indeterminate    Subjective Complaints:  Date of injury 1/26/2018. Mechanism of injury-felt pain in both wrists lifting 30 pound tray. 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 She returns today 2 months after discharge with complaints of recurrent left wrist pain which developed 2 weeks after release. She now complains of frequent throbbing ulnar wrist pain which worsens with activity. No numbness or tingling.   Objective Findings: Left wrist shows no swelling, ecchymosis, heat. Mild tenderness over the distal ulna. Good range of motion.   Pre-Existing Condition(s):     Assessment:   Condition Worsened    Status: Additional Care Required  Permanent Disability:No    Plan: MedicationDiagnostics    Diagnostics: X-rayMRI    Comments:       Disability Information   Status: Released to Full Duty    From:  4/4/2018  Through: 5/3/2018 Restrictions are:     Physical Restrictions   Sitting:    Standing:    Stooping:    Bending:      Squatting:    Walking:    Climbing:    Pushing:      Pulling:    Other:    Reaching Above Shoulder (L):   Reaching Above Shoulder (R):       Reaching Below Shoulder (L):    Reaching Below Shoulder (R):      Not to exceed Weight Limits   Carrying(hrs):   Weight Limit(lb):   Lifting(hrs):   Weight  Limit(lb):     Comments: Activity as  tolerated    Repetitive Actions   Hands: i.e. Fine Manipulations from Grasping:     Feet: i.e. Operating Foot Controls:     Driving / Operate Machinery:     Physician Name: Stefan Arroyo M.D. Physician Signature: STEFAN Herron M.D. e-Signature: Dr. Ras Wilde, Medical Director   Clinic Name / Location: [address],   [address]  [place] [address] Clinic Phone Number: Dept: [phone]   Appointment Time: 11:35 Am Visit Start Time: 11:12 AM   Check-In Time:  10:59 Am Visit Discharge Time:  12:17pm   Original-Treating Physician or Chiropractor    Page 2-Insurer/TPA    Page 3-Employer    Page 4-Employee

## 2018-04-04 NOTE — PROGRESS NOTES
"Subjective:      Belgica Cesar is a 27 y.o. female who presents with Other ([facility] DOI 1/26/18, (L) wrist, worse, Rm 2)      Date of injury 1/26/2018. Mechanism of injury-felt pain in both wrists lifting 30 pound tray. 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 She returns today 2 months after discharge with complaints of recurrent left wrist pain which developed 2 weeks after release. She now complains of frequent throbbing ulnar wrist pain which worsens with activity. No numbness or tingling.     HPI    Review of Systems   Constitutional: Negative.    Neurological: Negative.      PFSH:  WORK STATUS: Restricted activity  MEDS:   Current Outpatient Prescriptions:   •  predniSONE (DELTASONE) 20 MG Tab, Take 1 Tab by mouth 2 times a day., Disp: 14 Tab, Rfl: 0  •  Desogestrel-Ethinyl Estradiol (APRI PO), Take  by mouth every bedtime., Disp: , Rfl:        Objective:     /68   Pulse 86   Temp 36.7 °C (98 °F)   Resp 14   Ht 1.626 m (5' 4\")   Wt 59.9 kg (132 lb)   SpO2 99%   BMI 22.66 kg/m²      Physical Exam    Left wrist shows no swelling, ecchymosis, heat. Mild tenderness over the distal ulna. Good range of motion.       Assessment/Plan:     1. Strain of wrist, bilateral  2. Left wrist pain  Condition worsened  - DX-WRIST-COMPLETE 3+ LEFT; Future  - MR-WRIST W/O LEFT; Future  - REFERRAL TO RADIOLOGY  - predniSONE (DELTASONE) 20 MG Tab; Take 1 Tab by mouth 2 times a day.  Dispense: 14 Tab; Refill: 0  - Recheck in 4 weeks or sooner if MRI accomplished  - Anticipate specialty referral      "

## 2018-04-17 ENCOUNTER — HOSPITAL ENCOUNTER (OUTPATIENT)
Dept: RADIOLOGY | Facility: MEDICAL CENTER | Age: 28
End: 2018-04-17
Attending: PREVENTIVE MEDICINE
Payer: COMMERCIAL

## 2018-04-17 DIAGNOSIS — S66.912A STRAIN OF WRIST, BILATERAL: ICD-10-CM

## 2018-04-17 DIAGNOSIS — M25.532 LEFT WRIST PAIN: ICD-10-CM

## 2018-04-17 DIAGNOSIS — S66.911A STRAIN OF WRIST, BILATERAL: ICD-10-CM

## 2018-04-17 PROCEDURE — 73221 MRI JOINT UPR EXTREM W/O DYE: CPT | Mod: LT

## 2018-04-19 ENCOUNTER — OCCUPATIONAL MEDICINE (OUTPATIENT)
Dept: OCCUPATIONAL MEDICINE | Facility: CLINIC | Age: 28
End: 2018-04-19
Payer: COMMERCIAL

## 2018-04-19 ENCOUNTER — OFFICE VISIT (OUTPATIENT)
Dept: MEDICAL GROUP | Facility: PHYSICIAN GROUP | Age: 28
End: 2018-04-19
Payer: COMMERCIAL

## 2018-04-19 ENCOUNTER — HOSPITAL ENCOUNTER (OUTPATIENT)
Dept: LAB | Facility: MEDICAL CENTER | Age: 28
End: 2018-04-19
Attending: NURSE PRACTITIONER
Payer: COMMERCIAL

## 2018-04-19 VITALS
BODY MASS INDEX: 23.05 KG/M2 | WEIGHT: 135 LBS | RESPIRATION RATE: 14 BRPM | DIASTOLIC BLOOD PRESSURE: 84 MMHG | TEMPERATURE: 98.1 F | HEIGHT: 64 IN | SYSTOLIC BLOOD PRESSURE: 112 MMHG | OXYGEN SATURATION: 95 % | HEART RATE: 63 BPM

## 2018-04-19 VITALS
HEART RATE: 65 BPM | HEIGHT: 64 IN | DIASTOLIC BLOOD PRESSURE: 64 MMHG | WEIGHT: 135 LBS | BODY MASS INDEX: 23.05 KG/M2 | RESPIRATION RATE: 14 BRPM | OXYGEN SATURATION: 96 % | SYSTOLIC BLOOD PRESSURE: 100 MMHG

## 2018-04-19 DIAGNOSIS — Z00.00 WELL ADULT EXAM: ICD-10-CM

## 2018-04-19 DIAGNOSIS — Z87.11 HISTORY OF STOMACH ULCERS: ICD-10-CM

## 2018-04-19 DIAGNOSIS — M25.532 LEFT WRIST PAIN: ICD-10-CM

## 2018-04-19 DIAGNOSIS — S66.911A STRAIN OF WRIST, BILATERAL: ICD-10-CM

## 2018-04-19 DIAGNOSIS — S66.912A STRAIN OF WRIST, BILATERAL: ICD-10-CM

## 2018-04-19 DIAGNOSIS — Z23 NEED FOR VACCINATION: ICD-10-CM

## 2018-04-19 PROCEDURE — 99395 PREV VISIT EST AGE 18-39: CPT | Mod: 25 | Performed by: NURSE PRACTITIONER

## 2018-04-19 PROCEDURE — 90471 IMMUNIZATION ADMIN: CPT | Performed by: NURSE PRACTITIONER

## 2018-04-19 PROCEDURE — 86735 MUMPS ANTIBODY: CPT

## 2018-04-19 PROCEDURE — 99213 OFFICE O/P EST LOW 20 MIN: CPT | Performed by: PREVENTIVE MEDICINE

## 2018-04-19 PROCEDURE — 90715 TDAP VACCINE 7 YRS/> IM: CPT | Performed by: NURSE PRACTITIONER

## 2018-04-19 ASSESSMENT — ENCOUNTER SYMPTOMS
NEUROLOGICAL NEGATIVE: 1
CONSTITUTIONAL NEGATIVE: 1

## 2018-04-19 ASSESSMENT — PAIN SCALES - GENERAL: PAINLEVEL: 7=MODERATE-SEVERE PAIN

## 2018-04-19 NOTE — PROGRESS NOTES
"Subjective:      Belgica Cesar is a 27 y.o. female who presents with Follow-Up ([facility] 1/26/18, (L) wrist, worse, Rm 2)      Date of injury 1/26/2018. Mechanism of injury-felt pain in both wrists lifting 30 pound tray. 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 She continues to report interval worsening 7/10 ulnar left wrist pain. The pain is mostly activity related and throbbing in quality. She does report episodic sharp shooting pains. She has difficulty with many of her work tasks which involved lifting. She reports difficulty opening jars.     HPI    Review of Systems   Constitutional: Negative.    Neurological: Negative.      PFSH:  WORK STATUS: Full duty  PMH:  has a past medical history of Asthma and Migraines. She also has no past medical history of CAD (coronary artery disease); Cancer (CMS-[facility]); Chronic obstructive pulmonary disease (CMS-HCC); Congestive heart failure (CMS-[facility]); Diabetes (CMS-[facility]); Hypertension; Infectious disease; Liver disease; Psychiatric disorder; Renal disorder; Seizure disorder (CMS-[facility]); or Stroke (CMS-[facility]).  MEDS:   Current Outpatient Prescriptions:   •  Desogestrel-Ethinyl Estradiol (APRI PO), Take  by mouth every bedtime., Disp: , Rfl:        Objective:     /64   Pulse 65   Resp 14   Ht 1.626 m (5' 4\")   Wt 61.2 kg (135 lb)   SpO2 96%   BMI 23.17 kg/m²      Physical Exam    Left wrist shows no swelling, ecchymosis, heat. There is mild tenderness adjacent to the ulnar styloid.   MRI left wrist: 1.  Ulnar positive variance with marrow edema involving the proximal pole of the lunate which can be seen in the clinical setting of ulnar abutment. There is no evidence of tear of the triangular fibrocartilage. 2.  Mild marrow edema involving the triquetrum dorsally which may be related to contusion or degenerative change. 3.  Small synovial cysts emanating from the pisiform articulation and the distal radial ulnar joint volarly. 4.  Intact ligaments " and tendons.       Assessment/Plan:     1. Strain of wrist, bilateral  2. Left wrist pain  3. 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

## 2018-04-19 NOTE — LETTER
[address],   [address] [place] [address]  Phone:  [phone] - Fax:  [phone]   [facility] Progress Report and Disability Certification  Date of Service: 4/19/2018   No Show:  No  Date / Time of Next Visit: 5/17/2018 @ 11:15 AM    Claim Information   Patient Name: Belgica Cesar  Claim Number:     Employer: [employer]  Date of Injury: 1/26/2018     Insurer / TPA: Workers Choice  ID / SSN:     Occupation: [employer]  Diagnosis: Diagnoses of Strain of wrist, bilateral and Left wrist pain were pertinent to this visit.    Medical Information   Related to Industrial Injury?   Comments:Indeterminate    Subjective Complaints:  Date of injury 1/26/2018. Mechanism of injury-felt pain in both wrists lifting 30 pound tray. 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 She continues to report interval worsening 7/10 ulnar left wrist pain. The pain is mostly activity related and throbbing in quality. She does report episodic sharp shooting pains. She has difficulty with many of her work tasks which involved lifting. She reports difficulty opening jars.   Objective Findings: Left wrist shows no swelling, ecchymosis, heat. There is mild tenderness adjacent to the ulnar styloid.   MRI left wrist: 1.  Ulnar positive variance with marrow edema involving the proximal pole of the lunate which can be seen in the clinical setting of ulnar abutment. There is no evidence of tear of the triangular fibrocartilage. 2.  Mild marrow edema involving the triquetrum dorsally which may be related to contusion or degenerative change. 3.  Small synovial cysts emanating from the pisiform articulation and the distal radial ulnar joint volarly. 4.  Intact ligaments and tendons.   Pre-Existing Condition(s):     Assessment:   Condition Worsened    Status: Additional Care Required  Permanent Disability:No    Plan: Consultation    Diagnostics:      Comments:   Orthopedic consultation preference Dr. Veras    Disability Information   Status: Released to Restricted Duty    From:  4/19/2018  Through: 5/17/2018 Restrictions are: Temporary   Physical Restrictions   Sitting:    Standing:    Stooping:    Bending:      Squatting:    Walking:    Climbing:    Pushing:  < or = to 2 hrs/day   Pulling:    Other:    Reaching Above Shoulder (L):   Reaching Above Shoulder (R):       Reaching Below Shoulder (L):    Reaching Below Shoulder (R):      Not to exceed Weight Limits   Carrying(hrs):   Weight Limit(lb):   Lifting(hrs):   Weight  Limit(lb): < or = to 10 pounds   Comments: Limited use of left arm as tolerated    Repetitive Actions   Hands: i.e. Fine Manipulations from Grasping:     Feet: i.e. Operating Foot Controls:     Driving / Operate Machinery:     Physician Name: Stefan Arroyo M.D. Physician Signature: STEFAN Herron M.D. e-Signature: Dr. Ras Wilde, Medical Director   Clinic Name / Location: [address],   [address] [address] Clinic Phone Number: Dept: [phone]   Appointment Time: 1:35 Pm Visit Start Time: 1:36 PM   Check-In Time:  1:21 Pm Visit Discharge Time:  2:18 PM    Original-Treating Physician or Chiropractor    Page 2-Insurer/TPA    Page 3-Employer    Page 4-Employee

## 2018-04-19 NOTE — ASSESSMENT & PLAN NOTE
Here for school physical exam.  Normal findings.  Recommended daily multiple vitamin, hand hygiene and self care.  TDAP due today and needs titer for Mumps.  Orders provided.

## 2018-04-19 NOTE — PROGRESS NOTES
"Chief Complaint   Patient presents with   • Employment Physical   • Immunizations     TDAP       Subjective:   Belgica Cesar is a 27 y.o. female here today for annual prevenative exam andevaluation and management of:    History of stomach ulcers  Improved with omeprazole.  Taking daily.  Discussed GERD and lifestyle changes.     Well adult exam  Here for school physical exam.  Normal findings.  Recommended daily multiple vitamin, hand hygiene and self care.  TDAP due today and needs titer for Mumps.  Orders provided.          Current medicines (including changes today)  Current Outpatient Prescriptions   Medication Sig Dispense Refill   • Desogestrel-Ethinyl Estradiol (APRI PO) Take  by mouth every bedtime.       No current facility-administered medications for this visit.      She  has a past medical history of Asthma and Migraines. She also has no past medical history of CAD (coronary artery disease); Cancer (CMS-HCC); Chronic obstructive pulmonary disease (CMS-HCC); Congestive heart failure (CMS-HCC); Diabetes (CMS-HCC); Hypertension; Infectious disease; Liver disease; Psychiatric disorder; Renal disorder; Seizure disorder (CMS-HCC); or Stroke (CMS-HCC).    ROS as stated in hpi  No chest pain, no shortness of breath, no abdominal pain       Objective:     Blood pressure 112/84, pulse 63, temperature 36.7 °C (98.1 °F), resp. rate 14, height 1.626 m (5' 4\"), weight 61.2 kg (135 lb), SpO2 95 %. Body mass index is 23.17 kg/m².   Physical Exam:  Constitutional: Alert, no distress.  Skin: Warm, dry, good turgor,no cyanosis, no rashes in visible areas.  Eye: Equal, round and reactive, conjunctiva clear, lids normal.  Ears: No tenderness, no discharge.  External canals are without any significant edema or erythema.  Tympanic membranes are without any inflammation, no effusion.  Gross auditory acuity is intact.  Nose: symmetrical without tenderness, no discharge.  Mouth/Throat: lips without lesion.  Oropharynx clear.  " Throat without erythema, exudates or tonsillar enlargement.  Neck: Trachea midline, no masses, no obvious thyroid enlargement.. No cervical or supraclavicular lymphadenopathy. Range of motion within normal limits.  Neuro: Cranial nerves 2-12 grossly intact.  No sensory deficit.  Respiratory: Unlabored respiratory effort, lungs clear to auscultation, no wheezes, no ronchi.  Cardiovascular: Normal S1, S2, no murmur, no edema.  Abdomen: Soft, non-tender, no masses, no guarding,  no hepatosplenomegaly.  Psych: Alert and oriented x3, normal affect and mood and judgement.        Assessment and Plan:   The following treatment plan was discussed    1. Need for vaccination  Due for TDAP.  Consent obtained.  No acute illness  I have placed the below orders and discussed them with an approved delegating provider.  The MA is performing the below orders under the direction of Maryellen Charlton D.O..    - TDAP VACCINE =>6YO IM  - MUMPS AB IGG; Future    2. History of stomach ulcers  Chronic. Improved with omeprazole.  Discussed diet and lifestyle changes that can improve symtpoms.  All allergy/h pylori negative.     3. 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  No abnormal findings.  Discussed lifestyle and self care.  Return in one year.       Followup: Return in about 1 year (around 4/19/2019).

## 2018-04-20 LAB — MUV IGG SER IA-ACNC: 1.98

## 2018-04-26 ENCOUNTER — PATIENT MESSAGE (OUTPATIENT)
Dept: MEDICAL GROUP | Facility: PHYSICIAN GROUP | Age: 28
End: 2018-04-26

## 2018-04-27 ENCOUNTER — OFFICE VISIT (OUTPATIENT)
Dept: URGENT CARE | Facility: PHYSICIAN GROUP | Age: 28
End: 2018-04-27
Payer: COMMERCIAL

## 2018-04-27 VITALS
HEIGHT: 64 IN | DIASTOLIC BLOOD PRESSURE: 74 MMHG | HEART RATE: 79 BPM | BODY MASS INDEX: 23.05 KG/M2 | WEIGHT: 135 LBS | SYSTOLIC BLOOD PRESSURE: 116 MMHG | TEMPERATURE: 98.6 F | OXYGEN SATURATION: 98 % | RESPIRATION RATE: 16 BRPM

## 2018-04-27 DIAGNOSIS — B96.89 ACUTE BACTERIAL SINUSITIS: ICD-10-CM

## 2018-04-27 DIAGNOSIS — J01.90 ACUTE BACTERIAL SINUSITIS: ICD-10-CM

## 2018-04-27 PROCEDURE — 99214 OFFICE O/P EST MOD 30 MIN: CPT | Performed by: PHYSICIAN ASSISTANT

## 2018-04-27 RX ORDER — DOXYCYCLINE HYCLATE 100 MG
100 TABLET ORAL 2 TIMES DAILY
Qty: 20 TAB | Refills: 0 | Status: SHIPPED | OUTPATIENT
Start: 2018-04-27 | End: 2018-05-07

## 2018-04-27 RX ORDER — OMEPRAZOLE 20 MG/1
20 CAPSULE, DELAYED RELEASE ORAL DAILY
COMMUNITY
End: 2019-11-04

## 2018-04-27 NOTE — PROGRESS NOTES
Chief Complaint   Patient presents with   • Cough     worse in last 4 days       HISTORY OF PRESENT ILLNESS: Patient is a 27 y.o. female who presents today because she has a 4 week history of nasal and sinus congestion, cough with phlegm production starting clear, now turning green. She has been trying some over-the-counter echinacea and that has not been helping.    Patient Active Problem List    Diagnosis Date Noted   • Well adult exam 04/19/2018   • Migraine without aura and without status migrainosus, not intractable 03/15/2018   • Mild intermittent asthma without complication 03/15/2018   • History of stomach ulcers 03/15/2018       Allergies:Pcn [penicillins]    Current Outpatient Prescriptions Ordered in [facility]   Medication Sig Dispense Refill   • omeprazole (PRILOSEC) 20 MG delayed-release capsule Take 20 mg by mouth every day.     • doxycycline (VIBRAMYCIN) 100 MG Tab Take 1 Tab by mouth 2 times a day for 10 days. 20 Tab 0   • Desogestrel-Ethinyl Estradiol (APRI PO) Take  by mouth every bedtime.       No current Epic-ordered facility-administered medications on file.        Past Medical History:   Diagnosis Date   • Asthma    • Migraines        Social History   Substance Use Topics   • Smoking status: Never Smoker   • Smokeless tobacco: Never Used   • Alcohol use No       Family Status   Relation Status   • Mother Alive   • Father      Family History   Problem Relation Age of Onset   • Hypertension Mother    • No Known Problems Father        ROS:  Review of Systems   Constitutional: Negative for fever, chills, weight loss and malaise/fatigue.   HENT: Negative for ear pain, nosebleeds, positive for nasal and sinus congestion, sore throat and no neck pain.    Eyes: Negative for blurred vision.   Respiratory: Positive for cough, with sputum production, no shortness of breath and wheezing.    Cardiovascular: Negative for chest pain, palpitations, orthopnea and leg swelling.   Gastrointestinal: Negative for  "heartburn, nausea, vomiting and abdominal pain.   Genitourinary: Negative for dysuria, urgency and frequency.     Exam:  Blood pressure 116/74, pulse 79, temperature 37 °C (98.6 °F), resp. rate 16, height 1.626 m (5' 4\"), weight 61.2 kg (135 lb), SpO2 98 %.  General:  Well nourished, well developed female in NAD, nasal vocal tone  Head:Normocephalic, atraumatic  Eyes: PERRLA, EOM within normal limits, no conjunctival injection, no scleral icterus, visual fields and acuity grossly intact.  Ears: Normal shape and symmetry, no tenderness, no discharge. External canals are without any significant edema or erythema. Tympanic membranes are without any inflammation, no effusion. Gross auditory acuity is intact  Nose: Symmetrical without tenderness, no discharge. Nasal mucosa on the right is edematous, with thick green posterior nasal cavity drainage  Mouth: reasonable hygiene, no erythema exudates or tonsillar enlargement.  Neck: no masses, range of motion within normal limits, no tracheal deviation. No obvious thyroid enlargement.  Pulmonary: chest is symmetrical with respiration, no wheezes, crackles, or rhonchi.  Cardiovascular: regular rate and rhythm without murmurs, rubs, or gallops.  Extremities: no clubbing, cyanosis, or edema.    Please note that this dictation was created using voice recognition software. I have made every reasonable attempt to correct obvious errors, but I expect that there are errors of grammar and possibly content that I did not discover before finalizing the note.    Assessment/Plan:  1. Acute bacterial sinusitis  doxycycline (VIBRAMYCIN) 100 MG Tab   Recommended over-the-counter Mucinex D.    Followup with primary care in the next 7-10 days if not significantly improving, return to the urgent care or go to the emergency room sooner for any worsening of symptoms.       "

## 2018-07-10 ENCOUNTER — PATIENT MESSAGE (OUTPATIENT)
Dept: MEDICAL GROUP | Facility: PHYSICIAN GROUP | Age: 28
End: 2018-07-10

## 2018-07-10 RX ORDER — DESOGESTREL AND ETHINYL ESTRADIOL 0.15-0.03
1 KIT ORAL
Qty: 28 TAB | Refills: 11 | Status: SHIPPED | OUTPATIENT
Start: 2018-07-10 | End: 2018-10-09 | Stop reason: SDUPTHER

## 2018-07-10 NOTE — TELEPHONE ENCOUNTER
Was the patient seen in the last year in this department? Yes     Does patient have an active prescription for medications requested? No     Received Request Via: Patient  
car

## 2018-07-17 ENCOUNTER — APPOINTMENT (OUTPATIENT)
Dept: RADIOLOGY | Facility: MEDICAL CENTER | Age: 28
End: 2018-07-17
Attending: EMERGENCY MEDICINE
Payer: COMMERCIAL

## 2018-07-17 ENCOUNTER — HOSPITAL ENCOUNTER (EMERGENCY)
Facility: MEDICAL CENTER | Age: 28
End: 2018-07-17
Attending: EMERGENCY MEDICINE
Payer: COMMERCIAL

## 2018-07-17 VITALS
OXYGEN SATURATION: 98 % | TEMPERATURE: 98.2 F | WEIGHT: 135.36 LBS | DIASTOLIC BLOOD PRESSURE: 77 MMHG | BODY MASS INDEX: 23.98 KG/M2 | HEIGHT: 63 IN | SYSTOLIC BLOOD PRESSURE: 117 MMHG | HEART RATE: 63 BPM | RESPIRATION RATE: 16 BRPM

## 2018-07-17 DIAGNOSIS — S63.509A SPRAIN OF WRIST, UNSPECIFIED LATERALITY, INITIAL ENCOUNTER: ICD-10-CM

## 2018-07-17 PROCEDURE — 73110 X-RAY EXAM OF WRIST: CPT | Mod: LT

## 2018-07-17 PROCEDURE — 99284 EMERGENCY DEPT VISIT MOD MDM: CPT

## 2018-07-17 ASSESSMENT — PAIN SCALES - GENERAL: PAINLEVEL_OUTOF10: 7

## 2018-07-17 NOTE — LETTER
[facility], EMERGENCY DEPT   [address] [place] [address]  Phone: Dept: [phone] - Fax:        [facility] Progress Report and Disability Certification  Date of Service: 7/17/2018   No Show:  No  Date / Time of Next Visit: 7/22/2018   Claim Information   Patient Name: Belgica Cesar  Claim Number:     Employer: [employer]  Date of Injury: 1/26/2018     Insurer / TPA: [employer] ID / SSN: [id number]    Occupation: [employer] Diagnosis: The encounter diagnosis was Sprain of wrist, unspecified laterality, initial encounter.    Medical Information   Related to Industrial Injury?   ***   Subjective Complaints:  Complaining of mild-to-moderate pain of left wrist after twisting injury. She has a history of injury previously in January and states that his exacerbation of her injury.   Objective Findings: Tenderness to the wrist, no deformity, no swelling. No neurological deficits   Pre-Existing Condition(s): Previous wrist injury   Assessment:   Initial Visit    Status:    Permanent Disability:     Plan: Medication    Diagnostics: X-ray  Comments:negative for fracture, dislocation or significant abnormality    Comments:       Disability Information   Status: Released to Restricted Duty    From:  7/17/2018  Through: 7/22/2018 Restrictions are: Temporary   Physical Restrictions   Sitting:    Standing:    Stooping:    Bending:      Squatting:    Walking:    Climbing:    Pushing:  Never   Pulling:  Never Other:    Reaching Above Shoulder (L):   Reaching Above Shoulder (R):       Reaching Below Shoulder (L):    Reaching Below Shoulder (R):      Not to exceed Weight Limits   Carrying(hrs):   Weight Limit(lb):   Lifting(hrs):   Weight  Limit(lb):     Comments: No use of the left wrist until cleared by orthopedics or her Workmen's Compensation    Repetitive Actions   Hands: i.e. Fine Manipulations from Grasping:     Feet: i.e. Operating Foot Controls:     Driving /  Operate Machinery:     Physician Name: Gansert, Gary Physician Signature: sea-EFRAIN Nelson D.O. e-Signature:  , Medical Director   Clinic Name / Location: [facility], EMERGENCY DEPT  [address]  [place] [address]  [phone]     Clinic Phone Number: Dept: [phone]   Appointment Time:  Visit Start Time:    Check-In Time:  7:50 PM Visit Discharge Time:    Original-Treating Physician or Chiropractor    Page 2-Insurer/TPA    Page 3-Employer    Page 4-Employee

## 2018-07-17 NOTE — LETTER
[facility], EMERGENCY DEPT   [address] [place] [address]  Phone: Dept: [phone] - Fax:        [facility] Progress Report and Disability Certification  Date of Service: 7/17/2018   No Show:  No  Date / Time of Next Visit:     Claim Information   Patient Name: Belgica Cesar  Claim Number:     Employer: [employer]  Date of Injury: 1/26/2018     Insurer / TPA: [employer] ID / SSN: [id number]    Occupation: [employer] Diagnosis: The encounter diagnosis was Sprain of wrist, unspecified laterality, initial encounter.    Medical Information   Related to Industrial Injury?  yes    Subjective Complaints:   right wrist pain after twisting injury    Objective Findings:  pain with motions of the right wrist    Pre-Existing Condition(s):  none    Assessment:     right wrist sprain    Status:    Permanent Disability:     Plan:      Diagnostics:      Comments:       Disability Information   Status:      From:     Through:   Restrictions are:     Physical Restrictions   Sitting:    Standing:    Stooping:    Bending:      Squatting:    Walking:    Climbing:    Pushing:      Pulling:    Other:    Reaching Above Shoulder (L):   Reaching Above Shoulder (R):       Reaching Below Shoulder (L):    Reaching Below Shoulder (R):      Not to exceed Weight Limits   Carrying(hrs):   Weight Limit(lb):   Lifting(hrs):   Weight  Limit(lb):     Comments:      Repetitive Actions   Hands: i.e. Fine Manipulations from Grasping:     Feet: i.e. Operating Foot Controls:     Driving / Operate Machinery:     Physician Name: Gansert, Gary Physician Signature:   e-Signature:  , Medical Director   Clinic Name / Location: [facility], EMERGENCY DEPT  [address] [place] [address]  [phone]     Clinic Phone Number: Dept: [phone]   Appointment Time:  Visit Start Time:    Check-In Time:  7:50 PM Visit Discharge Time:       Original-Treating Physician or Chiropractor    Page 2-Insurer/TPA    Page 3-Employer    Page 4-Employee

## 2018-07-17 NOTE — LETTER
"  FORM C-4:  EMPLOYEE’S CLAIM FOR COMPENSATION/ REPORT OF INITIAL TREATMENT  EMPLOYEE’S CLAIM - PROVIDE ALL INFORMATION REQUESTED   First Name  Belgica Last Name  Arsenio Birthdate             Age  1990 27 y.o. Sex  female Claim Number   Home Employee Address  [address]  [facility]                                     Zip  [address] Height  1.6 m (5' 3\") Weight  61.4 kg (135 lb 5.8 oz) [facility]  [id number]   Mailing Employee Address                           [address]   [facility]               Zip  [address] Telephone  [phone] (home)  Primary Language Spoken  ENGLISH   Insurer  *** Third Party   WORKERS CHOICE Employee's Occupation (Job Title) When Injury or Occupational Disease Occurred  [employer]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   Address or Location of Accident (if applicable)  [[address] [facility]. [facility] [address]]   What were you doing at the time of accident? (if applicable)  Lifting a tray off rack    How did this injury or occupational disease occur? Be specific and answer in detail. Use additional sheet if necessary)  I was lifting the polarstem inst. tray off of the rack in autoclave to transfer onto a cart  to bring upstairs.    If you believe that you have an occupational disease, when did you first have knowledge of the disability and it relationship to your employment?  N/A Witnesses to the Accident  [facility]     Nature of Injury or Occupational Disease  Workers' Compensation  Part(s) of Body Injured or Affected  Wrist (L) and Hand (L), Wrist (R) and Hand (R), N/A    I certify that the above is true and correct to the best of my knowledge and " that I have provided this information in order to obtain the benefits of [place] inclusive or Chapter 617 of [place]).  I hereby authorize any physician, chiropractor, surgeon, practitioner, or other person, any hospital, including [facility] or [facility], any medical service organization, any insurance company, or other institution or organization to release to each other, any medical or other information, including benefits paid or payable, pertinent to this injury or disease, except information relative to diagnosis, treatment and/or counseling for AIDS, psychological conditions, alcohol or controlled substances, for which I must give specific authorization.  A Photostat of this authorization shall be as valid as the original.   Date Place   Employee’s Signature   THIS REPORT MUST BE COMPLETED AND MAILED WITHIN 3 WORKING DAYS OF TREATMENT   Place  [facility], EMERGENCY DEPT  Name of Facility   [facility]   Date  7/17/2018 Diagnosis  (S63.509A) Sprain of wrist, unspecified laterality, initial encounter Is there evidence the injured employee was under the influence of alcohol and/or another controlled substance at the time of accident?   Hour  10:02 PM Description of Injury or Disease  Sprain of wrist, unspecified laterality, initial encounter No   Treatment  Ice rest and orthopedic follow-up  Have you advised the patient to remain off work five days or more?         No   X-Ray Findings  Negative   If Yes   From Date    To Date      From information given by the employee, together with medical evidence, can you directly connect this injury or occupational disease as job incurred?  Yes If No, is the employee capable of: Full Duty  Yes Modified Duty      Is additional medical care by a physician indicated?  Yes If Modified Duty, Specify any Limitations / Restrictions        Do  "you know of any previous injury or disease contributing to this condition or occupational disease?  No   Date  7/17/2018 Print Doctor’s Name  Gansert, Gary I certify the employer’s copy of this form was mailed on:   Address  [address] [id number] [phone] Insurer’s Use Only   [facility]  [address]    Provider’s Tax ID Number    Telephone  Dept: [phone]    Doctor’s Signature  e-SignGANSERT, GARY M.D. Degree       Original - TREATING PHYSICIAN OR CHIROPRACTOR   Pg 2-Insurer/TPA   Pg 3-Employer   Pg 4-Employee                                                                                                  Form C-4 (rev01/03)     BRIEF DESCRIPTION OF RIGHTS AND BENEFITS  (Pursuant to NRS 616C.050)    Notice of Injury or Occupational Disease (Incident Report Form C-1): If an injury or occupational disease (OD) arises out of and in the course of employment, you must provide written notice to your employer as soon as practicable, but no later than 7 days after the accident or OD. Your employer shall maintain a sufficient supply of the required forms.    Claim for Compensation (Form C-4): If medical treatment is sought, the form C-4 is available at the place of initial treatment. A completed \"Claim for Compensation\" (Form C-4) must be filed within 90 days after an accident or OD. The treating physician or chiropractor must, within 3 working days after treatment, complete and mail to the employer, the employer's insurer and third-party , the Claim for Compensation.    Medical Treatment: If you require medical treatment for your on-the-job injury or OD, you may be required to select a physician or chiropractor from a list provided by your workers’ compensation insurer, if it has contracted with an Organization for Managed Care (MCO) or Preferred Provider Organization (PPO) or providers of health care. If your employer has not entered into a contract with an MCO or " PPO, you may select a physician or chiropractor from the Panel of Physicians and Chiropractors. Any medical costs related to your industrial injury or OD will be paid by your insurer.    Temporary Total Disability (TTD): If your doctor has certified that you are unable to work for a period of at least 5 consecutive days, or 5 cumulative days in a 20-day period, or places restrictions on you that your employer does not accommodate, you may be entitled to TTD compensation.    Temporary Partial Disability (TPD): If the wage you receive upon reemployment is less than the compensation for TTD to which you are entitled, the insurer may be required to pay you TPD compensation to make up the difference. TPD can only be paid for a maximum of 24 months.    Permanent Partial Disability (PPD): When your medical condition is stable and there is an indication of a PPD as a result of your injury or OD, within 30 days, your insurer must arrange for an evaluation by a rating physician or chiropractor to determine the degree of your PPD. The amount of your PPD award depends on the date of injury, the results of the PPD evaluation and your age and wage.    Permanent Total Disability (PTD): If you are medically certified by a treating physician or chiropractor as permanently and totally disabled and have been granted a PTD status by your insurer, you are entitled to receive monthly benefits not to exceed 66 2/3% of your average monthly wage. The amount of your PTD payments is subject to reduction if you previously received a PPD award.    Vocational Rehabilitation Services: You may be eligible for vocational rehabilitation services if you are unable to return to the job due to a permanent physical impairment or permanent restrictions as a result of your injury or occupational disease.    Transportation and Per Srinivasan Reimbursement: You may be eligible for travel expenses and per srinivasan associated with medical treatment.  Reopening: You may  be able to reopen your claim if your condition worsens after claim closure.    Appeal Process: If you disagree with a written determination issued by the insurer or the insurer does not respond to your request, you may appeal to the Department of Administration, , by following the instructions contained in your determination letter. You must appeal the determination within 70 days from the date of the determination letter at [address], [address], [place], [place] [address], or [address], [place], [place] [address]. If you disagree with the  decision, you may appeal to the Department of Administration, . You must file your appeal within 30 days from the date of the  decision letter at [address], [address], [place], [place] [address], or [address], [place], [place] [address]. If you disagree with a decision of an , you may file a petition for judicial review with the District Court. You must do so within 30 days of the Appeal Officer’s decision. You may be represented by an  at your own expense or you may contact the [facility] for possible representation.    [place]  for Injured Workers (NAIW): If you disagree with a  decision, you may request that NAIW represent you without charge at an  Hearing. For information regarding denial of benefits, you may contact the [facility] at: [address], [address], [place] [address], [phone], or [address], [place] [address], [phone]    To File a Complaint with the Division: If you wish to file a complaint with the  of the Division of Industrial Relations (DIR), please contact the Workers’ Compensation Section, [address], [address], [place], [place] [address], telephone (744) 426-6302, or [address], [address],  [place], [place] [address], telephone [phone].    For assistance with Workers’ Compensation Issues: you may contact the Office of the Governor Consumer Health Assistance, [address] [place] [address], [address], [place], [place] [address], Toll Free 1-702.104.5389, Web site: http://[email or web address]., E-mail [email or web address].                                                                                                                                                                               __________________________________________________________________                                    _________________            Employee Name / Signature                                                                                                                            Date                                       D-2 (rev. 10/07)

## 2018-07-17 NOTE — LETTER
[facility], EMERGENCY DEPT   [address] [address]  Phone: Dept: [phone] - Fax:        [facility] Progress Report and Disability Certification  Date of Service: 7/17/2018   No Show:  No  Date / Time of Next Visit:     Claim Information   Patient Name: Belgica Cesar  Claim Number:     Employer: [employer]  Date of Injury: 1/26/2018     Insurer / TPA: Workers Choice ID / SSN:    Occupation: [employer] Diagnosis: The encounter diagnosis was Sprain of wrist, unspecified laterality, initial encounter.    Medical Information   Related to Industrial Injury?   ***   Subjective Complaints:      Objective Findings:     Pre-Existing Condition(s):     Assessment:        Status:    Permanent Disability:     Plan:      Diagnostics:      Comments:       Disability Information   Status:      From:     Through:   Restrictions are:     Physical Restrictions   Sitting:    Standing:    Stooping:    Bending:      Squatting:    Walking:    Climbing:    Pushing:      Pulling:    Other:    Reaching Above Shoulder (L):   Reaching Above Shoulder (R):       Reaching Below Shoulder (L):    Reaching Below Shoulder (R):      Not to exceed Weight Limits   Carrying(hrs):   Weight Limit(lb):   Lifting(hrs):   Weight  Limit(lb):     Comments:      Repetitive Actions   Hands: i.e. Fine Manipulations from Grasping:     Feet: i.e. Operating Foot Controls:     Driving / Operate Machinery:     Physician Name: Gansert, Gary Physician Signature:   e-Signature:  , Medical Director   Clinic Name / Location: [facility], EMERGENCY DEPT  [address] [place] [id number] [phone]     Clinic Phone Number: Dept: [phone]   Appointment Time:  Visit Start Time:    Check-In Time:  7:50 PM Visit Discharge Time:    Original-Treating Physician or Chiropractor    Page 2-Insurer/TPA    Page 3-Employer    Page  4-Employee

## 2018-07-17 NOTE — LETTER
"  FORM C-4:  EMPLOYEE’S CLAIM FOR COMPENSATION/ REPORT OF INITIAL TREATMENT  EMPLOYEE’S CLAIM - PROVIDE ALL INFORMATION REQUESTED   First Name  Belgica Last Name  Arsenio Birthdate             Age  1990 27 y.o. Sex  female Claim Number   Home Employee Address  [address]  [facility]                                     Zip  [address] Height  1.6 m (5' 3\") Weight  61.4 kg (135 lb 5.8 oz) 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 [facility] [address]]   What were you doing at the time of accident? (if applicable)  Lifting a tray off rack    How did this injury or occupational disease occur? Be specific and answer in detail. Use additional sheet if necessary)  I was lifting the polarstem inst. tray off of the rack in autoclave to transfer onto a cart  to bring upstairs.    If you believe that you have an occupational disease, when did you first have knowledge of the disability and it relationship to your employment?  N/A Witnesses to the Accident  [facility]     Nature of Injury or Occupational Disease  Workers' Compensation  Part(s) of Body Injured or Affected  Wrist (L) and Hand (L), Wrist (R) and Hand (R), N/A    I certify that the above is true and correct to the best of my knowledge and " that I have provided this information in order to obtain the benefits of [place]’s [place] and [place] Acts ([place] 616A to 616D, inclusive or Chapter 617 of [place]RS).  I hereby authorize any physician, chiropractor, surgeon, practitioner, or other person, any hospital, including [facility], any medical service organization, any insurance company, or other institution or organization to release to each other, any medical or other information, including benefits paid or payable, pertinent to this injury or disease, except information relative to diagnosis, treatment and/or counseling for AIDS, psychological conditions, alcohol or controlled substances, for which I must give specific authorization.  A Photostat of this authorization shall be as valid as the original.   Date Place   Employee’s Signature   THIS REPORT MUST BE COMPLETED AND MAILED WITHIN 3 WORKING DAYS OF TREATMENT   Place  [facility], EMERGENCY DEPT  Name of Facility   [facility]   Date  7/17/2018 Diagnosis  (S63.509A) Sprain of wrist, unspecified laterality, initial encounter Is there evidence the injured employee was under the influence of alcohol and/or another controlled substance at the time of accident?   Hour  10:01 PM Description of Injury or Disease  Sprain of wrist, unspecified laterality, initial encounter No   Treatment  Ice rest and orthopedic follow-up  Have you advised the patient to remain off work five days or more?         No   X-Ray Findings  Negative   If Yes   From Date    To Date      From information given by the employee, together with medical evidence, can you directly connect this injury or occupational disease as job incurred?  Yes If No, is the employee capable of: Full Duty  Yes Modified Duty      Is additional medical care by a physician indicated?  Yes If Modified Duty, Specify any Limitations / Restrictions        Do  "you know of any previous injury or disease contributing to this condition or occupational disease?  No   Date  7/17/2018 Print Doctor’s Name  Gansert, Gary I certify the employer’s copy of this form was mailed on:   Address  [address] [id number] [phone] Insurer’s Use Only   [facility]  [address]    Provider’s Tax ID Number    Telephone  Dept: [phone]    Doctor’s Signature  e-SignGANSERT, GARY M.D. Degree       Original - TREATING PHYSICIAN OR CHIROPRACTOR   Pg 2-Insurer/TPA   Pg 3-Employer   Pg 4-Employee                                                                                                  Form C-4 (rev01/03)     BRIEF DESCRIPTION OF RIGHTS AND BENEFITS  ([place]ursuant to [place]RS 616C.050)    Notice of Injury or Occupational Disease (Incident Report Form C-1): If an injury or occupational disease (OD) arises out of and in the course of employment, you must provide written notice to your employer as soon as practicable, but no later than 7 days after the accident or OD. Your employer shall maintain a sufficient supply of the required forms.    Claim for Compensation (Form C-4): If medical treatment is sought, the form C-4 is available at the place of initial treatment. A completed \"Claim for Compensation\" (Form C-4) must be filed within 90 days after an accident or OD. The treating physician or chiropractor must, within 3 working days after treatment, complete and mail to the employer, the employer's insurer and third-party , the Claim for Compensation.    Medical Treatment: If you require medical treatment for your on-the-job injury or OD, you may be required to select a physician or chiropractor from a list provided by your workers’ compensation insurer, if it has contracted with an Organization for Managed Care (MCO) or Preferred Provider Organization (PPO) or providers of health care. If your employer has not entered into a contract with an MCO or " PPO, you may select a physician or chiropractor from the Panel of Physicians and Chiropractors. Any medical costs related to your industrial injury or OD will be paid by your insurer.    Temporary Total Disability (TTD): If your doctor has certified that you are unable to work for a period of at least 5 consecutive days, or 5 cumulative days in a 20-day period, or places restrictions on you that your employer does not accommodate, you may be entitled to TTD compensation.    Temporary Partial Disability (TPD): If the wage you receive upon reemployment is less than the compensation for TTD to which you are entitled, the insurer may be required to pay you TPD compensation to make up the difference. TPD can only be paid for a maximum of 24 months.    Permanent Partial Disability (PPD): When your medical condition is stable and there is an indication of a PPD as a result of your injury or OD, within 30 days, your insurer must arrange for an evaluation by a rating physician or chiropractor to determine the degree of your PPD. The amount of your PPD award depends on the date of injury, the results of the PPD evaluation and your age and wage.    Permanent Total Disability (PTD): If you are medically certified by a treating physician or chiropractor as permanently and totally disabled and have been granted a PTD status by your insurer, you are entitled to receive monthly benefits not to exceed 66 2/3% of your average monthly wage. The amount of your PTD payments is subject to reduction if you previously received a PPD award.    Vocational Rehabilitation Services: You may be eligible for vocational rehabilitation services if you are unable to return to the job due to a permanent physical impairment or permanent restrictions as a result of your injury or occupational disease.    Transportation and Per Srinivasan Reimbursement: You may be eligible for travel expenses and per srinivasan associated with medical treatment.  Reopening: You may  be able to reopen your claim if your condition worsens after claim closure.    Appeal Process: If you disagree with a written determination issued by the insurer or the insurer does not respond to your request, you may appeal to the Department of Administration, , by following the instructions contained in your determination letter. You must appeal the determination within 70 days from the date of the determination letter at [address], [address], [place], [place] [address], or [address], [place], [place] [address]. If you disagree with the  decision, you may appeal to the Department of Administration, . You must file your appeal within 30 days from the date of the  decision letter at [address], [address], [place], [place] [address], or [address], [place], [place] [address]. If you disagree with a decision of an , you may file a petition for judicial review with the District Court. You must do so within 30 days of the Appeal Officer’s decision. You may be represented by an  at your own expense or you may contact the [facility] for possible representation.    [place]  for Injured Workers ([place]IW): If you disagree with a  decision, you may request that NAIW represent you without charge at an  Hearing. For information regarding denial of benefits, you may contact the [facility] at: [address], [address], [place] [address], [phone], or [address], [place] [address], [phone]    To File a Complaint with the Division: If you wish to file a complaint with the  of the Division of Industrial Relations (DIR), please contact the Workers’ Compensation Section, [address], [address], [place], [place] [address], telephone (734) [phone], or [address], [address],  [place], [place] [address], telephone [phone].    For assistance with Workers’ Compensation Issues: you may contact the Office of the Governor Consumer Health Assistance, [address] [place] [address], [address], [place], [place] [address], Toll Free 1-505.131.7596, Web site: [email or web address].[facility].[email or web address]., E-mail [email or web address].[email or web address].                                                                                                                                                                               __________________________________________________________________                                    _________________            Employee Name / Signature                                                                                                                            Date                                       D-2 (rev. 10/07)

## 2018-07-17 NOTE — LETTER
[facility], EMERGENCY DEPT   [address] [address]  Phone: Dept: [phone] - Fax:        [facility] Progress Report and Disability Certification  Date of Service: 7/17/2018   No Show:  No  Date / Time of Next Visit:     Claim Information   Patient Name: Belgica Cesar  Claim Number:     Employer: [employer]  Date of Injury: 1/26/2018     Insurer / TPA: Workers Choice ID / SSN:     Occupation: [employer] Diagnosis: The encounter diagnosis was Sprain of wrist, unspecified laterality, initial encounter.    Medical Information   Related to Industrial Injury?   ***   Subjective Complaints:      Objective Findings:     Pre-Existing Condition(s):     Assessment:        Status:    Permanent Disability:     Plan:      Diagnostics:      Comments:       Disability Information   Status:      From:     Through:   Restrictions are:     Physical Restrictions   Sitting:    Standing:    Stooping:    Bending:      Squatting:    Walking:    Climbing:    Pushing:      Pulling:    Other:    Reaching Above Shoulder (L):   Reaching Above Shoulder (R):       Reaching Below Shoulder (L):    Reaching Below Shoulder (R):      Not to exceed Weight Limits   Carrying(hrs):   Weight Limit(lb):   Lifting(hrs):   Weight  Limit(lb):     Comments:      Repetitive Actions   Hands: i.e. Fine Manipulations from Grasping:     Feet: i.e. Operating Foot Controls:     Driving / Operate Machinery:     Physician Name: Gansert, Gary Physician Signature:   e-Signature:  , Medical Director   Clinic Name / Location: [facility], EMERGENCY DEPT  [address] [place] [address] [phone]     Clinic Phone Number: Dept: [phone]   Appointment Time:  Visit Start Time:    Check-In Time:  7:50 PM Visit Discharge Time:    Original-Treating Physician or Chiropractor    Page 2-Insurer/TPA    Page 3-Employer    Page  4-Employee

## 2018-07-17 NOTE — LETTER
[facility], EMERGENCY DEPT   [address] [address]  Phone: Dept: [phone] - Fax:        [facility] Progress Report and Disability Certification  Date of Service: 7/17/2018   No Show:  No  Date / Time of Next Visit:     Claim Information   Patient Name: Belgica Cesar  Claim Number:     Employer: [employer]  Date of Injury: 1/26/2018     Insurer / TPA: Workers Choice ID / SSN: [id number]    Occupation: [employer] Diagnosis: The encounter diagnosis was Sprain of wrist, unspecified laterality, initial encounter.    Medical Information   Related to Industrial Injury?   yes   Subjective Complaints:   wrist pain    Objective Findings:  pain with motion to the right wrist    Pre-Existing Condition(s):  none    Assessment:     right wrist sprain    Status:    Permanent Disability:     Plan:      Diagnostics:      Comments:       Disability Information   Status:      From:     Through:   Restrictions are:     Physical Restrictions   Sitting:    Standing:    Stooping:    Bending:      Squatting:    Walking:    Climbing:    Pushing:      Pulling:    Other:    Reaching Above Shoulder (L):   Reaching Above Shoulder (R):       Reaching Below Shoulder (L):    Reaching Below Shoulder (R):      Not to exceed Weight Limits   Carrying(hrs):   Weight Limit(lb):   Lifting(hrs):   Weight  Limit(lb):     Comments:      Repetitive Actions   Hands: i.e. Fine Manipulations from Grasping:     Feet: i.e. Operating Foot Controls:     Driving / Operate Machinery:     Physician Name: Gansert, Gary Physician Signature:   e-Signature:  , Medical Director   Clinic Name / Location: [facility], EMERGENCY DEPT  [address] [place] [address]  [phone]     Clinic Phone Number: Dept: [phone]   Appointment Time:  Visit Start Time:    Check-In Time:  7:50 PM Visit Discharge Time:    Original-Treating Physician  or Chiropractor    Page 2-Insurer/TPA    Page 3-Employer    Page 4-Employee

## 2018-07-17 NOTE — LETTER
[facility], EMERGENCY DEPT   [address] [address]  Phone: Dept: [phone] - Fax:        [facility] Progress Report and Disability Certification  Date of Service: 7/17/2018   No Show:  No  Date / Time of Next Visit: 7/22/2018   Claim Information   Patient Name: Belgica Cesar  Claim Number:     Employer: [employer]  Date of Injury: 1/26/2018     Insurer / TPA: Workers Choice ID / SSN:    Occupation: [employer] Diagnosis: The encounter diagnosis was Sprain of wrist, unspecified laterality, initial encounter.    Medical Information   Related to Industrial Injury?   ***   Subjective Complaints:  Complaining of mild-to-moderate pain of left wrist after twisting injury. She has a history of injury previously in January and states that his exacerbation of her injury.   Objective Findings: Tenderness to the wrist, no deformity, no swelling. No neurological deficits   Pre-Existing Condition(s): Previous wrist injury   Assessment:   Initial Visit    Status:    Permanent Disability:     Plan: Medication    Diagnostics: X-ray  Comments:negative for fracture, dislocation or significant abnormality    Comments:       Disability Information   Status: Released to Restricted Duty    From:  7/17/2018  Through: 7/22/2018 Restrictions are: Temporary   Physical Restrictions   Sitting:    Standing:    Stooping:    Bending:      Squatting:    Walking:    Climbing:    Pushing:  Never   Pulling:  Never Other:    Reaching Above Shoulder (L):   Reaching Above Shoulder (R):       Reaching Below Shoulder (L):    Reaching Below Shoulder (R):      Not to exceed Weight Limits   Carrying(hrs):   Weight Limit(lb):   Lifting(hrs):   Weight  Limit(lb):     Comments: No use of the left wrist until cleared by orthopedics or her Workmen's Compensation    Repetitive Actions   Hands: i.e. Fine Manipulations from Grasping:     Feet: i.e. Operating Foot Controls:     Driving /  Operate Machinery:     Physician Name: Gansert, Gary Physician Signature: sea-EFRAIN Nelson D.O. e-Signature:  , Medical Director   Clinic Name / Location: [facility], EMERGENCY DEPT  [address]  [place] [address]  [phone]     Clinic Phone Number: Dept: [phone]   Appointment Time:  Visit Start Time:    Check-In Time:  7:50 PM Visit Discharge Time:    Original-Treating Physician or Chiropractor    Page 2-Insurer/TPA    Page 3-Employer    Page 4-Employee

## 2018-07-17 NOTE — LETTER
"  FORM C-4:  EMPLOYEE’S CLAIM FOR COMPENSATION/ REPORT OF INITIAL TREATMENT  EMPLOYEE’S CLAIM - PROVIDE ALL INFORMATION REQUESTED   First Name  Belgica Last Name  Arsenio Birthdate             Age  1990 27 y.o. Sex  female Claim Number   Home Employee Address  [address]  [place]                                     Zip  [address] Height  1.6 m (5' 3\") Weight  61.4 kg (135 lb 5.8 oz) [facility]  [id number]   Mailing Employee Address                           [address]   [place]               Zip  [address] Telephone  [phone] (home)  Primary Language Spoken  ENGLISH   Insurer  *** Third Party   WORKERS CHOICE Employee's Occupation (Job Title) When Injury or Occupational Disease Occurred  [employer]   Employer's Name  [employer] Telephone  [phone]    Employer Address  [address]] Zip  [address]   Date of Injury  1/26/2018       Hour of Injury  7:55 PM Date Employer Notified  1/26/2018 Last Day of Work after Injury or Occupational Disease  1/26/2018 Supervisor to Whom Injury Reported  [facility]   Address or Location of Accident (if applicable)  [[address] [place] [facility] [address]]   What were you doing at the time of accident? (if applicable)  Lifting a tray off rack    How did this injury or occupational disease occur? Be specific and answer in detail. Use additional sheet if necessary)  I was lifting the polarstem inst. tray off of the rack in autoclave to transfer onto a cart  to bring upstairs.    If you believe that you have an occupational disease, when did you first have knowledge of the disability and it relationship to your employment?  N/A Witnesses to the Accident  [facility]     Nature of Injury or Occupational Disease  Workers' Compensation  Part(s) of Body Injured or Affected  Wrist (L) and Hand (L), Wrist (R) and Hand (R), N/A    I certify that the above is true and correct to the best of my knowledge and " that I have provided this information in order to obtain the benefits of [place]  I hereby authorize any physician, chiropractor, surgeon, practitioner, or other person, any hospital, including [facility] or [facility] hospital, any medical service organization, any insurance company, or other institution or organization to release to each other, any medical or other information, including benefits paid or payable, pertinent to this injury or disease, except information relative to diagnosis, treatment and/or counseling for AIDS, psychological conditions, alcohol or controlled substances, for which I must give specific authorization.  A Photostat of this authorization shall be as valid as the original.   Date Place   Employee’s Signature   THIS REPORT MUST BE COMPLETED AND MAILED WITHIN 3 WORKING DAYS OF TREATMENT   Place  [facility], EMERGENCY DEPT  Name of Facility   [facility]   Date  7/17/2018 Diagnosis  (S63.509A) Sprain of wrist, unspecified laterality, initial encounter Is there evidence the injured employee was under the influence of alcohol and/or another controlled substance at the time of accident?   Hour  10:09 PM Description of Injury or Disease  Sprain of wrist, unspecified laterality, initial encounter No   Treatment  Ice rest and orthopedic follow-up  Have you advised the patient to remain off work five days or more?         No   X-Ray Findings  Negative   If Yes   From Date    To Date      From information given by the employee, together with medical evidence, can you directly connect this injury or occupational disease as job incurred?  Yes If No, is the employee capable of: Full Duty  Yes Modified Duty      Is additional medical care by a physician indicated?  Yes If Modified Duty, Specify any Limitations / Restrictions        Do  "you know of any previous injury or disease contributing to this condition or occupational disease?  No   Date  7/17/2018 Print Doctor’s Name  Gansert Jensen I certify the employer’s copy of this form was mailed on:   Address  [address] [id number] [phone] Insurer’s Use Only   [facility]  [address]    Provider’s Tax ID Number    Telephone  Dept: [phone]    Doctor’s Signature    Degree       Original - TREATING PHYSICIAN OR CHIROPRACTOR   Pg 2-Insurer/TPA   Pg 3-Employer   Pg 4-Employee                                                                                                  Form C-4 (rev01/03)     BRIEF DESCRIPTION OF RIGHTS AND BENEFITS  (Pursuant to [place] 616C.050)    Notice of Injury or Occupational Disease (Incident Report Form C-1): If an injury or occupational disease (OD) arises out of and in the course of employment, you must provide written notice to your employer as soon as practicable, but no later than 7 days after the accident or OD. Your employer shall maintain a sufficient supply of the required forms.    Claim for Compensation (Form C-4): If medical treatment is sought, the form C-4 is available at the place of initial treatment. A completed \"Claim for Compensation\" (Form C-4) must be filed within 90 days after an accident or OD. The treating physician or chiropractor must, within 3 working days after treatment, complete and mail to the employer, the employer's insurer and third-party , the Claim for Compensation.    Medical Treatment: If you require medical treatment for your on-the-job injury or OD, you may be required to select a physician or chiropractor from a list provided by your workers’ compensation insurer, if it has contracted with an Organization for Managed Care (MCO) or Preferred Provider Organization (PPO) or providers of health care. If your employer has not entered into a contract with an MCO or PPO, you may select a " physician or chiropractor from the Panel of Physicians and Chiropractors. Any medical costs related to your industrial injury or OD will be paid by your insurer.    Temporary Total Disability (TTD): If your doctor has certified that you are unable to work for a period of at least 5 consecutive days, or 5 cumulative days in a 20-day period, or places restrictions on you that your employer does not accommodate, you may be entitled to TTD compensation.    Temporary Partial Disability (TPD): If the wage you receive upon reemployment is less than the compensation for TTD to which you are entitled, the insurer may be required to pay you TPD compensation to make up the difference. TPD can only be paid for a maximum of 24 months.    Permanent Partial Disability (PPD): When your medical condition is stable and there is an indication of a PPD as a result of your injury or OD, within 30 days, your insurer must arrange for an evaluation by a rating physician or chiropractor to determine the degree of your PPD. The amount of your PPD award depends on the date of injury, the results of the PPD evaluation and your age and wage.    Permanent Total Disability (PTD): If you are medically certified by a treating physician or chiropractor as permanently and totally disabled and have been granted a PTD status by your insurer, you are entitled to receive monthly benefits not to exceed 66 2/3% of your average monthly wage. The amount of your PTD payments is subject to reduction if you previously received a PPD award.    Vocational Rehabilitation Services: You may be eligible for vocational rehabilitation services if you are unable to return to the job due to a permanent physical impairment or permanent restrictions as a result of your injury or occupational disease.    Transportation and Per Srinivasan Reimbursement: You may be eligible for travel expenses and per srinivasan associated with medical treatment.  Reopening: You may be able to reopen  your claim if your condition worsens after claim closure.    Appeal Process: If you disagree with a written determination issued by the insurer or the insurer does not respond to your request, you may appeal to the Department of Administration, , by following the instructions contained in your determination letter. You must appeal the determination within 70 days from the date of the determination letter at [address], [address], [place], [place] [address], or [address], [address], [place], [place] [address]. If you disagree with the  decision, you may appeal to the Department of Administration, . You must file your appeal within 30 days from the date of the  decision letter at [address], [address], [place], [place] [address], or [address], [address], [place], [place] [address]. If you disagree with a decision of an , you may file a petition for judicial review with the District Court. You must do so within 30 days of the Appeal Officer’s decision. You may be represented by an  at your own expense or you may contact the [facility] for possible representation.    [place]  for Injured Workers (NAIW): If you disagree with a  decision, you may request that NAIW represent you without charge at an  Hearing. For information regarding denial of benefits, you may contact the [facility] at: [address], [address], [place] [address], [phone], or [address], [address], [place] [address], [phone]    To File a Complaint with the Division: If you wish to file a complaint with the  of the Division of Industrial Relations (DIR), please contact the Workers’ Compensation Section, [address], [address], [place], [place] [address], telephone [phone] [phone], or [address], [address], [place], [place] [address],  telephone [phone].    For assistance with Workers’ Compensation Issues: you may contact the Office of the Governor Consumer Health Assistance, [address] [place] [address], [address] [facility] [address], [place] [address], Toll Free 1-784.566.7885, Web site: http://NextG[employer]DX.[facility].nv., E-mail [email or web address].                                                                                                                                                                               __________________________________________________________________                                    _________________            Employee Name / Signature                                                                                                                            Date                                       D-2 (rev. 10/07)

## 2018-07-18 ENCOUNTER — PATIENT OUTREACH (OUTPATIENT)
Dept: HEALTH INFORMATION MANAGEMENT | Facility: OTHER | Age: 28
End: 2018-07-18

## 2018-07-18 NOTE — PROGRESS NOTES
Placed discharge outreach phone call to patient s/p ER discharge 7/17/18.  Left voicemail providing my contact information and instructions to call with any questions or concerns.

## 2018-07-18 NOTE — ED NOTES
Pt has been seeing Dr Stokes, with ortho, for a crush injury sustained while at work. Increased pain after twisting today.

## 2018-07-18 NOTE — ED PROVIDER NOTES
"ED Provider Note  CHIEF COMPLAINT  Chief Complaint   Patient presents with   • Wrist Injury       HPI  Belgica Cesar is a 27 y.o. female who presents with mild to moderate pain in the wrist after a twisting injury. She's injured her wrist before. No other injury. No injured the hand or the arm.    REVIEW OF SYSTEMS  See HPI for further details. Giraldo healthy no other complaint.  PAST MEDICAL HISTORY  Past Medical History:   Diagnosis Date   • Asthma    • Migraines        FAMILY HISTORY  Family History   Problem Relation Age of Onset   • Hypertension Mother    • No Known Problems Father        SOCIAL HISTORY  Social History     Social History   • Marital status: Single     Spouse name: N/A   • Number of children: N/A   • Years of education: N/A     Social History Main Topics   • Smoking status: Never Smoker   • Smokeless tobacco: Never Used   • Alcohol use No   • Drug use: No   • Sexual activity: Yes     Partners: Male     Birth control/ protection: Pill     Other Topics Concern   • Not on file     Social History Narrative   • No narrative on file         ALLERGIES  Allergies   Allergen Reactions   • Pcn [Penicillins] Hives       PHYSICAL EXAM  VITAL SIGNS: /67   Pulse 70   Temp 36.3 °C (97.4 °F)   Resp 18   Ht 1.6 m (5' 3\")   Wt 61.4 kg (135 lb 5.8 oz)   SpO2 98%   BMI 23.98 kg/m²   vital signs are noted  Constitutional: Alert healthy-appearing adult in no distress   HENT: Normocephalic   Cardiovascular: Regular rhythm   Thorax & Lungs: No respiratory distress   Abdomen: nontender  Skin: Warm, Dry, No rash. Musculoskeletal: Pain to the wrist. No deformity. No swelling. Minimal tenderness.  Neurologic: Alert  Normal motor function,  No obvious focal deficits noted.   Psychiatric: Affect normal, and mood normal.       RADIOLOGY/PROCEDURES  DX-WRIST-COMPLETE 3+ LEFT   Final Result      No acute osseous abnormality.            COURSE & MEDICAL DECISION MAKING  X-ray of the wrist shows no fracture. She is " wrist sprain. Stable for discharge. She'll take Tylenol for the pain. Ice and rest. Follow-up with her family physician.    FINAL IMPRESSION  1. Wrist sprain     Electronically signed by: Gary Gansert, 7/17/2018 9:09 PM

## 2018-07-18 NOTE — DISCHARGE INSTRUCTIONS
Ligament Sprain  Ligaments are tough, fibrous tissues that hold bones together at the joints. A sprain can occur when a ligament is stretched. This injury may take several weeks to heal.  HOME CARE INSTRUCTIONS   · Rest the injured area for as long as directed by your caregiver. Then slowly start using the joint as directed by your caregiver and as the pain allows.  · Keep the affected joint raised if possible to lessen swelling.  · Apply ice for 15-20 minutes to the injured area every couple hours for the first half day, then 3-4 times per day for the first 48 hours. Put the ice in a plastic bag and place a towel between the bag of ice and your skin.  · Wear any splinting, casting, or elastic bandage applications as instructed.  · Only take over-the-counter or prescription medicines for pain, discomfort, or fever as directed by your caregiver. Do not use aspirin immediately after the injury unless instructed by your caregiver. Aspirin can cause increased bleeding and bruising of the tissues.  · If you were given crutches, continue to use them as instructed and do not resume weight bearing on the affected extremity until instructed.  SEEK MEDICAL CARE IF:   · Your bruising, swelling, or pain increases.  · You have cold and numb fingers or toes if your arm or leg was injured.  SEEK IMMEDIATE MEDICAL CARE IF:   · Your toes are numb or blue if your leg was injured.  · Your fingers are numb or blue if your arm was injured.  · Your pain is not responding to medicines and continues to stay the same or gets worse.  MAKE SURE YOU:   · Understand these instructions.  · Will watch your condition.  · Will get help right away if you are not doing well or get worse.  Document Released: 12/15/2001 Document Revised: 03/11/2013 Document Reviewed: 10/13/2009  ExitCare® Patient Information ©2014 [employer].

## 2018-07-18 NOTE — ED PROVIDER NOTES
ED Provider Note  Addendum: The nursing staff asked me to complete ED 39 secondary to not being completed prior to discharge. I did not evaluate the patient but evaluated the chart and D39 was completed accordingly.

## 2018-07-18 NOTE — ED NOTES
Patient reports pre existing injury to left wrist, pt reports re injury to left wrist today while at work.

## 2018-10-08 ENCOUNTER — PATIENT MESSAGE (OUTPATIENT)
Dept: MEDICAL GROUP | Facility: PHYSICIAN GROUP | Age: 28
End: 2018-10-08

## 2018-10-09 RX ORDER — DESOGESTREL AND ETHINYL ESTRADIOL 0.15-0.03
1 KIT ORAL
Qty: 28 TAB | Refills: 11 | Status: SHIPPED | OUTPATIENT
Start: 2018-10-09 | End: 2019-11-04

## 2018-11-05 ENCOUNTER — PATIENT MESSAGE (OUTPATIENT)
Dept: MEDICAL GROUP | Facility: PHYSICIAN GROUP | Age: 28
End: 2018-11-05

## 2018-11-13 ENCOUNTER — APPOINTMENT (OUTPATIENT)
Dept: MEDICAL GROUP | Facility: PHYSICIAN GROUP | Age: 28
End: 2018-11-13
Payer: COMMERCIAL

## 2019-05-21 ENCOUNTER — EH NON-PROVIDER (OUTPATIENT)
Dept: OCCUPATIONAL MEDICINE | Facility: CLINIC | Age: 29
End: 2019-05-21

## 2019-05-21 ENCOUNTER — EMPLOYEE HEALTH (OUTPATIENT)
Dept: OCCUPATIONAL MEDICINE | Facility: CLINIC | Age: 29
End: 2019-05-21

## 2019-05-21 ENCOUNTER — HOSPITAL ENCOUNTER (OUTPATIENT)
Facility: MEDICAL CENTER | Age: 29
End: 2019-05-21
Attending: PREVENTIVE MEDICINE
Payer: COMMERCIAL

## 2019-05-21 VITALS
DIASTOLIC BLOOD PRESSURE: 68 MMHG | OXYGEN SATURATION: 98 % | HEIGHT: 64 IN | WEIGHT: 133 LBS | TEMPERATURE: 98 F | BODY MASS INDEX: 22.71 KG/M2 | RESPIRATION RATE: 14 BRPM | SYSTOLIC BLOOD PRESSURE: 112 MMHG | HEART RATE: 76 BPM

## 2019-05-21 DIAGNOSIS — Z02.1 PHYSICAL EXAM, PRE-EMPLOYMENT: ICD-10-CM

## 2019-05-21 DIAGNOSIS — Z02.1 ENCOUNTER FOR PRE-EMPLOYMENT HEALTH SCREENING EXAMINATION: ICD-10-CM

## 2019-05-21 DIAGNOSIS — Z02.1 PRE-EMPLOYMENT DRUG SCREENING: ICD-10-CM

## 2019-05-21 LAB
AMP AMPHETAMINE: NORMAL
BAR BARBITURATES: NORMAL
BZO BENZODIAZEPINES: NORMAL
COC COCAINE: NORMAL
INT CON NEG: NORMAL
INT CON POS: NORMAL
MDMA ECSTASY: NORMAL
MET METHAMPHETAMINES: NORMAL
MTD METHADONE: NORMAL
OPI OPIATES: NORMAL
OXY OXYCODONE: NORMAL
PCP PHENCYCLIDINE: NORMAL
POC URINE DRUG SCREEN OCDRS: NORMAL
THC: NORMAL

## 2019-05-21 PROCEDURE — 8915 PR COMPREHENSIVE PHYSICAL: Performed by: PREVENTIVE MEDICINE

## 2019-05-21 PROCEDURE — 86480 TB TEST CELL IMMUN MEASURE: CPT | Performed by: PREVENTIVE MEDICINE

## 2019-05-21 PROCEDURE — 94375 RESPIRATORY FLOW VOLUME LOOP: CPT | Performed by: PREVENTIVE MEDICINE

## 2019-05-21 PROCEDURE — 80305 DRUG TEST PRSMV DIR OPT OBS: CPT | Performed by: PREVENTIVE MEDICINE

## 2019-05-22 LAB
GAMMA INTERFERON BACKGROUND BLD IA-ACNC: 0.02 IU/ML
M TB IFN-G BLD-IMP: NEGATIVE
M TB IFN-G CD4+ BCKGRND COR BLD-ACNC: 0.01 IU/ML
MITOGEN IGNF BCKGRD COR BLD-ACNC: >10 IU/ML
QFT TB2 - NIL TBQ2: 0.01 IU/ML

## 2019-05-24 ENCOUNTER — EH NON-PROVIDER (OUTPATIENT)
Dept: OCCUPATIONAL MEDICINE | Facility: CLINIC | Age: 29
End: 2019-05-24

## 2019-05-24 DIAGNOSIS — Z71.85 IMMUNIZATION COUNSELING: ICD-10-CM

## 2019-08-12 ENCOUNTER — NON-PROVIDER VISIT (OUTPATIENT)
Dept: URGENT CARE | Facility: PHYSICIAN GROUP | Age: 29
End: 2019-08-12

## 2019-08-12 DIAGNOSIS — Z11.1 PPD SCREENING TEST: Primary | ICD-10-CM

## 2019-08-12 PROCEDURE — 86580 TB INTRADERMAL TEST: CPT | Performed by: PHYSICIAN ASSISTANT

## 2019-08-14 ENCOUNTER — NON-PROVIDER VISIT (OUTPATIENT)
Dept: URGENT CARE | Facility: PHYSICIAN GROUP | Age: 29
End: 2019-08-14

## 2019-08-14 LAB — TB WHEAL 3D P 5 TU DIAM: NORMAL MM

## 2019-08-27 ENCOUNTER — APPOINTMENT (OUTPATIENT)
Dept: RADIOLOGY | Facility: IMAGING CENTER | Age: 29
End: 2019-08-27
Attending: FAMILY MEDICINE
Payer: MEDICAID

## 2019-08-27 ENCOUNTER — OFFICE VISIT (OUTPATIENT)
Dept: URGENT CARE | Facility: PHYSICIAN GROUP | Age: 29
End: 2019-08-27
Payer: MEDICAID

## 2019-08-27 VITALS
OXYGEN SATURATION: 99 % | RESPIRATION RATE: 16 BRPM | DIASTOLIC BLOOD PRESSURE: 76 MMHG | TEMPERATURE: 98.6 F | HEART RATE: 78 BPM | WEIGHT: 130 LBS | BODY MASS INDEX: 22.31 KG/M2 | SYSTOLIC BLOOD PRESSURE: 120 MMHG

## 2019-08-27 DIAGNOSIS — M54.2 NECK PAIN: ICD-10-CM

## 2019-08-27 PROCEDURE — 72040 X-RAY EXAM NECK SPINE 2-3 VW: CPT | Performed by: FAMILY MEDICINE

## 2019-08-27 PROCEDURE — 99214 OFFICE O/P EST MOD 30 MIN: CPT | Performed by: FAMILY MEDICINE

## 2019-08-27 RX ORDER — PREDNISONE 20 MG/1
TABLET ORAL
Qty: 12 TAB | Refills: 0 | Status: SHIPPED | OUTPATIENT
Start: 2019-08-27 | End: 2019-10-03

## 2019-09-05 ENCOUNTER — HOSPITAL ENCOUNTER (OUTPATIENT)
Dept: LAB | Facility: MEDICAL CENTER | Age: 29
End: 2019-09-05
Payer: MEDICAID

## 2019-09-05 LAB
BDY FAT % MEASURED: 18.7 %
BP DIAS: 80 MMHG
BP SYS: 118 MMHG
CHOLEST SERPL-MCNC: 161 MG/DL (ref 100–199)
DIABETES HTDIA: NO
EVENT NAME HTEVT: NORMAL
FASTING HTFAS: YES
GLUCOSE SERPL-MCNC: 80 MG/DL (ref 65–99)
HDLC SERPL-MCNC: 48 MG/DL
HYPERTENSION HTHYP: NO
LDLC SERPL CALC-MCNC: 89 MG/DL
SCREENING LOC CITY HTCIT: NORMAL
SCREENING LOC STATE HTSTA: NORMAL
SCREENING LOCATION HTLOC: NORMAL
SMOKING HTSMO: NO
SUBSCRIBER ID HTSID: NORMAL
TRIGL SERPL-MCNC: 120 MG/DL (ref 0–149)

## 2019-09-25 ENCOUNTER — OFFICE VISIT (OUTPATIENT)
Dept: URGENT CARE | Facility: PHYSICIAN GROUP | Age: 29
End: 2019-09-25
Payer: MEDICAID

## 2019-09-25 VITALS
HEART RATE: 72 BPM | BODY MASS INDEX: 22.2 KG/M2 | SYSTOLIC BLOOD PRESSURE: 102 MMHG | TEMPERATURE: 98.6 F | HEIGHT: 64 IN | OXYGEN SATURATION: 97 % | RESPIRATION RATE: 14 BRPM | DIASTOLIC BLOOD PRESSURE: 62 MMHG | WEIGHT: 130 LBS

## 2019-09-25 DIAGNOSIS — G89.29 CHRONIC NECK PAIN: ICD-10-CM

## 2019-09-25 DIAGNOSIS — M54.2 CHRONIC NECK PAIN: ICD-10-CM

## 2019-09-25 PROCEDURE — 99214 OFFICE O/P EST MOD 30 MIN: CPT | Performed by: PHYSICIAN ASSISTANT

## 2019-09-25 ASSESSMENT — ENCOUNTER SYMPTOMS
DOUBLE VISION: 0
SEIZURES: 0
PALPITATIONS: 0
CLAUDICATION: 0
DIARRHEA: 0
SPEECH CHANGE: 0
NECK PAIN: 1
WEAKNESS: 0
ABDOMINAL PAIN: 0
SHORTNESS OF BREATH: 0
VOMITING: 0
FOCAL WEAKNESS: 0
HEADACHES: 0
TINGLING: 0
ORTHOPNEA: 0
FEVER: 0
TREMORS: 0
LOSS OF CONSCIOUSNESS: 0
DIZZINESS: 0
BLURRED VISION: 0
SENSORY CHANGE: 0
NAUSEA: 0
CHILLS: 0
COUGH: 0

## 2019-09-25 NOTE — PATIENT INSTRUCTIONS
Cervical Strain and Sprain Rehab  Ask your health care provider which exercises are safe for you. Do exercises exactly as told by your health care provider and adjust them as directed. It is normal to feel mild stretching, pulling, tightness, or discomfort as you do these exercises, but you should stop right away if you feel sudden pain or your pain gets worse. Do not begin these exercises until told by your health care provider.  Stretching and range of motion exercises  These exercises warm up your muscles and joints and improve the movement and flexibility of your neck. These exercises also help to relieve pain, numbness, and tingling.  Exercise A: Cervical side bend  1. Using good posture, sit on a stable chair or stand up.  2. Without moving your shoulders, slowly tilt your left / right ear to your shoulder until you feel a stretch in your neck muscles. You should be looking straight ahead.  3. Hold for __________ seconds.  4. Repeat with the other side of your neck.  Repeat __________ times. Complete this exercise __________ times a day.  Exercise B: Cervical rotation  1. Using good posture, sit on a stable chair or stand up.  2. Slowly turn your head to the side as if you are looking over your left / right shoulder.  ¨ Keep your eyes level with the ground.  ¨ Stop when you feel a stretch along the side and the back of your neck.  3. Hold for __________ seconds.  4. Repeat this by turning to your other side.  Repeat __________ times. Complete this exercise __________ times a day.  Exercise C: Thoracic extension and pectoral stretch  1. Roll a towel or a small blanket so it is about 4 inches (10 cm) in diameter.  2. Lie down on your back on a firm surface.  3. Put the towel lengthwise, under your spine in the middle of your back. It should not be not under your shoulder blades. The towel should line up with your spine from your middle back to your lower back.  4. Put your hands behind your head and let your  elbows fall out to your sides.  5. Hold for __________ seconds.  Repeat __________ times. Complete this exercise __________ times a day.  Strengthening exercises  These exercises build strength and endurance in your neck. Endurance is the ability to use your muscles for a long time, even after your muscles get tired.  Exercise D: Upper cervical flexion, isometric  1. Lie on your back with a thin pillow behind your head and a small rolled-up towel under your neck.  2. Gently tuck your chin toward your chest and nod your head down to look toward your feet. Do not lift your head off the pillow.  3. Hold for __________ seconds.  4. Release the tension slowly. Relax your neck muscles completely before you repeat this exercise.  Repeat __________ times. Complete this exercise __________ times a day.  Exercise E: Cervical extension, isometric  1. Stand about 6 inches (15 cm) away from a wall, with your back facing the wall.  2. Place a soft object, about 6-8 inches (15-20 cm) in diameter, between the back of your head and the wall. A soft object could be a small pillow, a ball, or a folded towel.  3. Gently tilt your head back and press into the soft object. Keep your jaw and forehead relaxed.  4. Hold for __________ seconds.  5. Release the tension slowly. Relax your neck muscles completely before you repeat this exercise.  Repeat __________ times. Complete this exercise __________ times a day.  Posture and body mechanics     Body mechanics refers to the movements and positions of your body while you do your daily activities. Posture is part of body mechanics. Good posture and healthy body mechanics can help to relieve stress in your body's tissues and joints. Good posture means that your spine is in its natural S-curve position (your spine is neutral), your shoulders are pulled back slightly, and your head is not tipped forward. The following are general guidelines for applying improved posture and body mechanics to your  everyday activities.  Standing  · When standing, keep your spine neutral and keep your feet about hip-width apart. Keep a slight bend in your knees. Your ears, shoulders, and hips should line up.  · When you do a task in which you  one place for a long time, place one foot up on a stable object that is 2-4 inches (5-10 cm) high, such as a footstool. This helps keep your spine neutral.  Sitting  · When sitting, keep your spine neutral and your keep feet flat on the floor. Use a footrest, if necessary, and keep your thighs parallel to the floor. Avoid rounding your shoulders, and avoid tilting your head forward.  · When working at a desk or a computer, keep your desk at a height where your hands are slightly lower than your elbows. Slide your chair under your desk so you are close enough to maintain good posture.  · When working at a computer, place your monitor at a height where you are looking straight ahead and you do not have to tilt your head forward or downward to look at the screen.  Resting  When lying down and resting, avoid positions that are most painful for you. Try to support your neck in a neutral position. You can use a contour pillow or a small rolled-up towel. Your pillow should support your neck but not push on it.  This information is not intended to replace advice given to you by your health care provider. Make sure you discuss any questions you have with your health care provider.  Document Released: 12/18/2006 Document Revised: 08/24/2017 Document Reviewed: 11/23/2016  [employer] Patient Education © 2017 [employer]

## 2019-09-26 NOTE — PROGRESS NOTES
Subjective:      Belgica Cesar is a 28 y.o. female who presents with Neck Pain (x 9 months)            Neck Pain    This is a chronic problem. Episode onset: 9 months ago. The pain is present in the midline. The pain is moderate. Pertinent negatives include no chest pain, fever, headaches, tingling or weakness. She has tried chiropractic manipulation, NSAIDs and muscle relaxants for the symptoms. The treatment provided no relief.       Review of Systems   Constitutional: Negative for chills and fever.   Eyes: Negative for blurred vision and double vision.   Respiratory: Negative for cough and shortness of breath.    Cardiovascular: Negative for chest pain, palpitations, orthopnea, claudication and leg swelling.   Gastrointestinal: Negative for abdominal pain, diarrhea, nausea and vomiting.   Musculoskeletal: Positive for neck pain.   Skin: Negative for rash.   Neurological: Negative for dizziness, tingling, tremors, sensory change, speech change, focal weakness, seizures, loss of consciousness, weakness and headaches.   All other systems reviewed and are negative.    PMH:  has a past medical history of Asthma and Migraines. She also has no past medical history of CAD (coronary artery disease), Cancer (HCC), Chronic obstructive pulmonary disease (HCC), Congestive heart failure (HCC), Diabetes (HCC), Hypertension, Infectious disease, Liver disease, Psychiatric disorder, Renal disorder, Seizure disorder (HCC), or Stroke ([facility]).  MEDS:   Current Outpatient Medications:   •  Diclofenac Sodium 1 % Gel, Apply 0.5 g to affected area(s) 3 times a day for 10 days. Apply 2-4 grams to painful joint 4 times daily as needed for pain., Disp: 1 Tube, Rfl: 0  •  predniSONE (DELTASONE) 20 MG Tab, 2 TABS BY MOUTH ONCE A DAY ON DAYS 1-4, 1 TAB ONCE A DAY ON DAYS 5-8. TAKE WITH FOOD. (Patient not taking: Reported on 9/25/2019), Disp: 12 Tab, Rfl: 0  •  desogestrel-ethinyl estradiol (APRI) 0.15-30 MG-MCG per tablet, Take 1 Tab by mouth  "every bedtime. (Patient not taking: Reported on 8/27/2019), Disp: 28 Tab, Rfl: 11  •  omeprazole (PRILOSEC) 20 MG delayed-release capsule, Take 20 mg by mouth every day., Disp: , Rfl:   ALLERGIES:   Allergies   Allergen Reactions   • Pcn [Penicillins] Hives     SURGHX: History reviewed. No pertinent surgical history.  SOCHX:  reports that she has never smoked. She has never used smokeless tobacco. She reports that she does not drink alcohol or use drugs.  FH: Family history was reviewed, no pertinent findings to report  Medications, Allergies, and current problem list reviewed today in Epic       Objective:     Blood Pressure 102/62   Pulse 72   Temperature 37 °C (98.6 °F) (Temporal)   Respiration 14   Height 1.626 m (5' 4\")   Weight 59 kg (130 lb)   Oxygen Saturation 97%   Body Mass Index 22.31 kg/m²      Physical Exam   Constitutional: She is oriented to person, place, and time. She appears well-developed and well-nourished.  Non-toxic appearance. She does not have a sickly appearance. She does not appear ill. No distress.   HENT:   Head: Normocephalic and atraumatic.   Right Ear: External ear normal.   Left Ear: External ear normal.   Eyes: Conjunctivae and EOM are normal.   Neck: Normal range of motion. Neck supple.   Cardiovascular: Normal rate, regular rhythm, normal heart sounds, intact distal pulses and normal pulses.   Pulmonary/Chest: Effort normal and breath sounds normal.   Musculoskeletal: Normal range of motion. She exhibits tenderness. She exhibits no edema or deformity.   No joint pain above or below injury.  Neurovascularly intact distally from injury.  PTP of the post neck.  FULL ROM     Neurological: She is alert and oriented to person, place, and time. She has normal reflexes. She displays normal reflexes. She exhibits normal muscle tone. Coordination normal.   Skin: Skin is warm and dry. She is not diaphoretic.   Psychiatric: She has a normal mood and affect. Her behavior is normal. " Judgment and thought content normal.   Vitals reviewed.              Assessment/Plan:     1. Chronic neck pain  - exercises  - Diclofenac Sodium 1 % Gel; Apply 0.5 g to affected area(s) 3 times a day for 10 days. Apply 2-4 grams to painful joint 4 times daily as needed for pain.  Dispense: 1 Tube; Refill: 0  - follow up with physiatry

## 2019-10-03 ENCOUNTER — OFFICE VISIT (OUTPATIENT)
Dept: PHYSICAL MEDICINE AND REHAB | Facility: MEDICAL CENTER | Age: 29
End: 2019-10-03
Payer: MEDICAID

## 2019-10-03 VITALS
TEMPERATURE: 98.7 F | BODY MASS INDEX: 21.75 KG/M2 | WEIGHT: 127.43 LBS | HEIGHT: 64 IN | HEART RATE: 73 BPM | SYSTOLIC BLOOD PRESSURE: 105 MMHG | DIASTOLIC BLOOD PRESSURE: 62 MMHG | OXYGEN SATURATION: 98 %

## 2019-10-03 DIAGNOSIS — R20.0 LEFT ARM NUMBNESS: ICD-10-CM

## 2019-10-03 DIAGNOSIS — G89.29 CHRONIC NECK PAIN: ICD-10-CM

## 2019-10-03 DIAGNOSIS — M54.12 CERVICAL RADICULITIS: ICD-10-CM

## 2019-10-03 DIAGNOSIS — M54.2 CHRONIC NECK PAIN: ICD-10-CM

## 2019-10-03 PROCEDURE — 99205 OFFICE O/P NEW HI 60 MIN: CPT | Performed by: PHYSICAL MEDICINE & REHABILITATION

## 2019-10-03 RX ORDER — MELOXICAM 15 MG/1
15 TABLET ORAL DAILY
Qty: 30 TAB | Refills: 0 | Status: SHIPPED | OUTPATIENT
Start: 2019-10-03 | End: 2019-11-02

## 2019-10-03 RX ORDER — GABAPENTIN 300 MG/1
300 CAPSULE ORAL NIGHTLY PRN
Qty: 30 CAP | Refills: 0 | Status: SHIPPED | OUTPATIENT
Start: 2019-10-03 | End: 2019-10-16

## 2019-10-03 ASSESSMENT — PATIENT HEALTH QUESTIONNAIRE - PHQ9
4. FEELING TIRED OR HAVING LITTLE ENERGY: 0
7. TROUBLE CONCENTRATING ON THINGS, SUCH AS READING THE NEWSPAPER OR WATCHING TELEVISION: 0
3. TROUBLE FALLING OR STAYING ASLEEP OR SLEEPING TOO MUCH: 0
CLINICAL INTERPRETATION OF PHQ2 SCORE: 0
5. POOR APPETITE OR OVEREATING: 0
SUM OF ALL RESPONSES TO PHQ9 QUESTIONS 1 AND 2: 0
1. LITTLE INTEREST OR PLEASURE IN DOING THINGS: 0
2. FEELING DOWN, DEPRESSED, IRRITABLE, OR HOPELESS: 0
6. FEELING BAD ABOUT YOURSELF - OR THAT YOU ARE A FAILURE OR HAVE LET YOURSELF OR YOUR FAMILY DOWN: 0
8. MOVING OR SPEAKING SO SLOWLY THAT OTHER PEOPLE COULD HAVE NOTICED. OR THE OPPOSITE, BEING SO FIGETY OR RESTLESS THAT YOU HAVE BEEN MOVING AROUND A LOT MORE THAN USUAL: 0
SUM OF ALL RESPONSES TO PHQ QUESTIONS 1-9: 0
9. THOUGHTS THAT YOU WOULD BE BETTER OFF DEAD, OR OF HURTING YOURSELF: 0

## 2019-10-03 ASSESSMENT — PAIN SCALES - GENERAL: PAINLEVEL: 5=MODERATE PAIN

## 2019-10-03 NOTE — PROGRESS NOTES
New patient note    Physiatry (physical medicine and  Rehabilitation), interventional spine and sports medicine    Date of Service: 10/3/2019    Chief complaint:   Chief Complaint   Patient presents with   • New Patient     Neck Pain        HISTORY    HPI: Belgica eCsar 28 y.o. female who presents today with Diagnoses of Cervical radiculitis, likely mid to lower left, Chronic neck pain, and Left arm numbness were pertinent to this visit.    HPI  Chronic bilateral neck pain radiating down the medial aspect of the left arm, burning in quality, 6-7/10 in intensity. Acute onset after a headstand when in crossfit with a pop sensation originally. Denies weakness. Positive for numbness in the left medial arm. She has tried NSAIDs, topical, steroid taper, PT, massage therapy, chiropractor with no improvement. Difficulty with doing her job, exercise. Pain with driving.        Medical records review:  Bandar Payne PA-C 9/25/2019.  Chronic neck pain, exercise given.  Given diclofenac gel.    Reviewed the note from Dr. Dave Bobby from 8/27/2019.  Neck pain, x-rays ordered, given prednisone taper and referred to physiatry.      ROS:   Red Flags ROS:   Fever, Chills, Sweats: Denies  Involuntary Weight Loss: Denies  Bladder Incontinence: Denies  Bowel Incontinence: denies  Saddle Anesthesia: Denies    All other systems reviewed and negative.       PMHx:   Past Medical History:   Diagnosis Date   • Asthma    • Migraines          Current Outpatient Medications on File Prior to Visit   Medication Sig Dispense Refill   • Diclofenac Sodium 1 % Gel Apply 0.5 g to affected area(s) 3 times a day for 10 days. Apply 2-4 grams to painful joint 4 times daily as needed for pain. 1 Tube 0   • desogestrel-ethinyl estradiol (APRI) 0.15-30 MG-MCG per tablet Take 1 Tab by mouth every bedtime. (Patient not taking: Reported on 8/27/2019) 28 Tab 11   • omeprazole (PRILOSEC) 20 MG delayed-release capsule Take 20 mg by mouth every day.       No  current facility-administered medications on file prior to visit.         PSHx:   History reviewed. No pertinent surgical history.    Family history   Family History   Problem Relation Age of Onset   • Hypertension Mother    • No Known Problems Father          Medications: reviewed on epic.   Outpatient Medications Marked as Taking for the 10/3/19 encounter (Office Visit) with Gilles Ordonez M.D.   Medication Sig Dispense Refill   • meloxicam (MOBIC) 15 MG tablet Take 1 Tab by mouth every day for 30 days. For 30 days then stop. Do not take other NSAIDs. No refills. 30 Tab 0   • gabapentin (NEURONTIN) 300 MG Cap Take 1 Cap by mouth at bedtime as needed (pain). 30 Cap 0   • Diclofenac Sodium 1 % Gel Apply 0.5 g to affected area(s) 3 times a day for 10 days. Apply 2-4 grams to painful joint 4 times daily as needed for pain. 1 Tube 0        Allergies:   Allergies   Allergen Reactions   • Pcn [Penicillins] Hives       Social Hx:   Social History     Socioeconomic History   • Marital status: Single     Spouse name: Not on file   • Number of children: Not on file   • Years of education: Not on file   • Highest education level: Not on file   Occupational History   • Not on file   Social Needs   • Financial resource strain: Not on file   • Food insecurity:     Worry: Not on file     Inability: Not on file   • Transportation needs:     Medical: Not on file     Non-medical: Not on file   Tobacco Use   • Smoking status: Never Smoker   • Smokeless tobacco: Never Used   Substance and Sexual Activity   • Alcohol use: No   • Drug use: No   • Sexual activity: Yes     Partners: Male     Birth control/protection: Pill   Lifestyle   • Physical activity:     Days per week: Not on file     Minutes per session: Not on file   • Stress: Not on file   Relationships   • Social connections:     Talks on phone: Not on file     Gets together: Not on file     Attends [employer] service: Not on file     Active member of club or organization: Not  "on file     Attends meetings of clubs or organizations: Not on file     Relationship status: Not on file   • Intimate partner violence:     Fear of current or ex partner: Not on file     Emotionally abused: Not on file     Physically abused: Not on file     Forced sexual activity: Not on file   Other Topics Concern   •  Service No   • Blood Transfusions No   • Caffeine Concern No   • Occupational Exposure No   • Hobby Hazards No   • Sleep Concern No   • Stress Concern No   • Weight Concern No   • Special Diet No   • Back Care No   • Exercise Yes   • Bike Helmet Yes   • Seat Belt Yes   • Self-Exams Yes   Social History Narrative   • Not on file         EXAMINATION     Physical Exam:   Vitals: /62 (BP Location: Right arm, Patient Position: Sitting, BP Cuff Size: Adult)   Pulse 73   Temp 37.1 °C (98.7 °F) (Temporal)   Ht 1.626 m (5' 4\")   Wt 57.8 kg (127 lb 6.8 oz)   SpO2 98%     Constitutional:   Body Habitus: Body mass index is 21.87 kg/m².  Cooperation: Fully cooperates with exam  Appearance: Well-groomed, well-nourished, not disheveled     Eyes: No scleral icterus to suggest severe liver disease, no proptosis to suggest severe hyperthyroid    ENT -no obvious auditory deficits, no obvious tongue lesions, tongue midline, no facial droop     Skin -no rashes or lesions noted     Respiratory-  breathing comfortable on room air, no audible wheezing    Cardiovascular- capillary refills less than 2 seconds. No lower extremity edema is noted.     Gastrointestinal - no obvious abdominal masses, No tenderness to palpation in the abdomen    Psychiatric- alert and oriented ×3. Normal affect.     Gait - normal gait, no use of ambulatory device, nonantalgic. the patient can toe walk with ease. the patient can heel walk with ease. the patient can tandem walk with ease. balance is appropriate..     Musculoskeletal -   right and left Shoulder  Inspection: No rashes are noted on the bilateral shoulders. Humerus is " not grossly high riding when comparing the right and left sides.  Palpation: No tenderness to palpation over the biceps tendon, acromioclavicular joint, scapular spine, supraspinous, infraspinatus, greater tuberosity, lesser tuberosity, trapezius.  Range of motion: Active range of motion forward ark and lateral arc within normal limits. The patient is able to lower his arm slowly with no drop arm.  Special tests:  Neers: negative  Enamorado: negative  Speeds: Negative  Jürgensen signs: Negative  Empty can: negative  O'Briens test: Negative  Crossarm test: Negative  Resisted internal rotation: Negative  Resisted external rotation: neagtive  Resisted elbow extension: Negative     Cervical spine   Inspection: No deformities of the skin over the cervical spine. No rashes or lesions.    full   active range of motion in all directions, with  pain      Spurling’s sign: negative right, positive left    No signs of muscular atrophy in bilateral upper extremities     Positive for tenderness to palpation on the LEFT side in the cervical facets.       Neuro       Key points for the international standards for neurological classification of spinal cord injury (ISNCSCI) to light touch.     Dermatome R L   C4 2 2   C5 2 2   C6 2 2   C7 2 2   C8 2 2   T1 2 2   T2 2 2                                   Left C5 is normal right now but this is typically the area of numbness when she has a flare.      Motor Exam Upper Extremities   ? Myotome R L   Shoulder flexion C5 5 5   Elbow flexion C5 5 5   Wrist extension C6 5 5   Elbow extension C7 5 5   Finger flexion C8 5 5   Finger abduction T1 5 5             Yen’s sign negative bilaterally     Reflexes  ?  R L   Biceps  2+ 2+   Brachioradialis  2+ 2+   Patella  2+ 2+                 MEDICAL DECISION MAKING    Medical records review: see under HPI section.     DATA    Labs:   Lab Results   Component Value Date/Time    SODIUM 139 03/15/2018 12:12 PM    POTASSIUM 4.1 03/15/2018 12:12 PM     CHLORIDE 107 03/15/2018 12:12 PM    CO2 25 03/15/2018 12:12 PM    ANION 7.0 03/15/2018 12:12 PM    GLUCOSE 80 09/05/2019 09:03 AM    BUN 12 03/15/2018 12:12 PM    CREATININE 0.88 03/15/2018 12:12 PM    CALCIUM 9.6 03/15/2018 12:12 PM    ASTSGOT 20 03/15/2018 12:12 PM    ALTSGPT 14 03/15/2018 12:12 PM    TBILIRUBIN 0.7 03/15/2018 12:12 PM    ALBUMIN 4.1 03/15/2018 12:12 PM    TOTPROTEIN 6.9 03/15/2018 12:12 PM    GLOBULIN 2.8 03/15/2018 12:12 PM    AGRATIO 1.5 03/15/2018 12:12 PM   ]    No results found for: PROTHROMBTM, INR     Lab Results   Component Value Date/Time    WBC 10.9 (H) 10/11/2017 07:57 PM    RBC 4.37 10/11/2017 07:57 PM    HEMOGLOBIN 13.4 10/11/2017 07:57 PM    HEMATOCRIT 39.5 10/11/2017 07:57 PM    MCV 90.4 10/11/2017 07:57 PM    MCH 30.7 10/11/2017 07:57 PM    MCHC 33.9 10/11/2017 07:57 PM    MPV 11.4 10/11/2017 07:57 PM    NEUTSPOLYS 54.20 10/11/2017 07:57 PM    LYMPHOCYTES 34.40 10/11/2017 07:57 PM    MONOCYTES 8.30 10/11/2017 07:57 PM    EOSINOPHILS 2.40 10/11/2017 07:57 PM    BASOPHILS 0.50 10/11/2017 07:57 PM        No results found for: HBA1C     Imaging:   I personally reviewed following images, these are my reads  X-ray cervical spine 8/27/2019  Normal study.  No acute changes.    IMAGING radiology reads. I reviewed the following radiology reads           Results for orders placed in visit on 08/27/19   DX-CERVICAL SPINE-2 OR 3 VIEWS    Impression Normal cervical spine series.                                                                                  Results for orders placed during the hospital encounter of 07/17/18   DX-WRIST-COMPLETE 3+ LEFT    Impression No acute osseous abnormality.           Diagnosis   Visit Diagnoses     ICD-10-CM   1. Cervical radiculitis, likely mid to lower left M54.12   2. Chronic neck pain M54.2    G89.29   3. Left arm numbness R20.0           ASSESSMENT AND PLAN:  Belgica Cesar 28 y.o. female      Belgica was seen today for new patient.    Diagnoses and  all orders for this visit:    Cervical radiculitis, likely mid to lower left  -     meloxicam (MOBIC) 15 MG tablet; Take 1 Tab by mouth every day for 30 days. For 30 days then stop. Do not take other NSAIDs. No refills.  -     gabapentin (NEURONTIN) 300 MG Cap; Take 1 Cap by mouth at bedtime as needed (pain).  -     MR-CERVICAL SPINE-W/O; Future    Chronic neck pain  -     meloxicam (MOBIC) 15 MG tablet; Take 1 Tab by mouth every day for 30 days. For 30 days then stop. Do not take other NSAIDs. No refills.  -     gabapentin (NEURONTIN) 300 MG Cap; Take 1 Cap by mouth at bedtime as needed (pain).  -     MR-CERVICAL SPINE-W/O; Future    Left arm numbness  -     meloxicam (MOBIC) 15 MG tablet; Take 1 Tab by mouth every day for 30 days. For 30 days then stop. Do not take other NSAIDs. No refills.  -     gabapentin (NEURONTIN) 300 MG Cap; Take 1 Cap by mouth at bedtime as needed (pain).  -     MR-CERVICAL SPINE-W/O; Future        -The patient very likely has a left cervical radiculopathy causing significant functional deficits, pain, difficulty doing her job and exercise program.  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  She is tried many conservative treatments including NSAIDs, topical medications, steroid taper, home exercise program and the patient is highly active, massage therapy, chiropractor and has had no significant improvement of her symptoms.  I do not believe physical therapy would be helpful for the patient she is already quite active involved in CrossFit and daily exercise which have not been helpful for this current problem.  I believe an MRI is medically necessary for this patient.  I will follow-up with patient after the MRI cervical spine.        Follow-up: After the above diagnostic studies         Please note that this dictation was created using voice recognition software. I have made every reasonable attempt to correct obvious errors but there may be  errors of grammar and content that I may have overlooked prior to finalization of this note.      Gilles Ordonez MD  Physical Medicine and Rehabilitation  Interventional Spine and Sports Physiatry  [facility]               CC GABINO Chen.

## 2019-10-03 NOTE — Clinical Note
Dear GABINO Chen. , Thank you for the referral of Belgica Cesar.  Please see my note for more details Should you have any questions or concerns please do not hesitate to contact me. Gilles Ordonez M.D.

## 2019-10-14 ENCOUNTER — HOSPITAL ENCOUNTER (OUTPATIENT)
Dept: RADIOLOGY | Facility: MEDICAL CENTER | Age: 29
End: 2019-10-14
Attending: PHYSICAL MEDICINE & REHABILITATION
Payer: MEDICAID

## 2019-10-14 DIAGNOSIS — M54.12 CERVICAL RADICULITIS: ICD-10-CM

## 2019-10-14 DIAGNOSIS — R20.0 LEFT ARM NUMBNESS: ICD-10-CM

## 2019-10-14 DIAGNOSIS — G89.29 CHRONIC NECK PAIN: ICD-10-CM

## 2019-10-14 DIAGNOSIS — M54.2 CHRONIC NECK PAIN: ICD-10-CM

## 2019-10-14 PROCEDURE — 72141 MRI NECK SPINE W/O DYE: CPT

## 2019-10-15 ENCOUNTER — TELEPHONE (OUTPATIENT)
Dept: PHYSICAL MEDICINE AND REHAB | Facility: MEDICAL CENTER | Age: 29
End: 2019-10-15

## 2019-10-15 NOTE — TELEPHONE ENCOUNTER
What do you mean by insurance? I can see her today to discuss the results. OK to double book at 245pm    Dr Ordonez

## 2019-10-16 ENCOUNTER — OFFICE VISIT (OUTPATIENT)
Dept: PHYSICAL MEDICINE AND REHAB | Facility: MEDICAL CENTER | Age: 29
End: 2019-10-16
Payer: MEDICAID

## 2019-10-16 VITALS
HEIGHT: 64 IN | BODY MASS INDEX: 21.68 KG/M2 | SYSTOLIC BLOOD PRESSURE: 106 MMHG | DIASTOLIC BLOOD PRESSURE: 62 MMHG | TEMPERATURE: 98.2 F | WEIGHT: 126.98 LBS | OXYGEN SATURATION: 98 % | HEART RATE: 68 BPM

## 2019-10-16 DIAGNOSIS — M54.12 CERVICAL RADICULOPATHY: ICD-10-CM

## 2019-10-16 DIAGNOSIS — R20.0 LEFT ARM NUMBNESS: ICD-10-CM

## 2019-10-16 DIAGNOSIS — M50.20 CERVICAL DISC HERNIATION: ICD-10-CM

## 2019-10-16 DIAGNOSIS — M54.12 CERVICAL RADICULITIS: ICD-10-CM

## 2019-10-16 DIAGNOSIS — G89.29 CHRONIC NECK PAIN: ICD-10-CM

## 2019-10-16 DIAGNOSIS — M54.2 CHRONIC NECK PAIN: ICD-10-CM

## 2019-10-16 PROCEDURE — 99214 OFFICE O/P EST MOD 30 MIN: CPT | Performed by: PHYSICAL MEDICINE & REHABILITATION

## 2019-10-16 RX ORDER — GABAPENTIN 300 MG/1
300 CAPSULE ORAL NIGHTLY PRN
Qty: 30 CAP | Refills: 6 | Status: SHIPPED | OUTPATIENT
Start: 2019-10-16 | End: 2019-11-25

## 2019-10-16 ASSESSMENT — PATIENT HEALTH QUESTIONNAIRE - PHQ9
SUM OF ALL RESPONSES TO PHQ QUESTIONS 1-9: 0
7. TROUBLE CONCENTRATING ON THINGS, SUCH AS READING THE NEWSPAPER OR WATCHING TELEVISION: 0
8. MOVING OR SPEAKING SO SLOWLY THAT OTHER PEOPLE COULD HAVE NOTICED. OR THE OPPOSITE, BEING SO FIGETY OR RESTLESS THAT YOU HAVE BEEN MOVING AROUND A LOT MORE THAN USUAL: 0
3. TROUBLE FALLING OR STAYING ASLEEP OR SLEEPING TOO MUCH: 0
SUM OF ALL RESPONSES TO PHQ9 QUESTIONS 1 AND 2: 0
1. LITTLE INTEREST OR PLEASURE IN DOING THINGS: 0
4. FEELING TIRED OR HAVING LITTLE ENERGY: 0
6. FEELING BAD ABOUT YOURSELF - OR THAT YOU ARE A FAILURE OR HAVE LET YOURSELF OR YOUR FAMILY DOWN: 0
2. FEELING DOWN, DEPRESSED, IRRITABLE, OR HOPELESS: 0
9. THOUGHTS THAT YOU WOULD BE BETTER OFF DEAD, OR OF HURTING YOURSELF: 0
5. POOR APPETITE OR OVEREATING: 0

## 2019-10-16 ASSESSMENT — PAIN SCALES - GENERAL: PAINLEVEL: 7=MODERATE-SEVERE PAIN

## 2019-10-16 NOTE — PATIENT INSTRUCTIONS
Your procedure will be at the [facility] special procedure suite.    [facility] [place] [address]       PRE-PROCEDURE INSTRUCTIONS  You may take your regular medications except:   · No Anti-inflammatories 5 days prior to your procedure. Anti-inflammatories include medicines such as  ibuprofen (Motrin, Advil), Excedrin, Naproxen (Aleve, Anaprox, Naprelan, Naprosyn), Celecoxib (Celebrex), Diclofenac (Voltaren-XR tab), and Meloxicam (Mobic).   · No Glucophage or Metformin 24 hours before your procedure. You may resume next day after your procedure.  · Call the physiatry office if you are taking or prescribed anti-biotics within five days of procedure.  · Please ask provider if you are taking any new diabetes medication.  · CONTINUE TAKING BLOOD PRESSURE MEDICATIONS AS PRESCRIBED.  · Pain medications will not be prescribed on the procedure day. Procedural pain medication may be used by your provider   · Call your doctor's office performing the procedure if you have a fever, chills, rash or new illness prior to your procedure    Anticoagulation/antiplatelet medications  No Blood thinning medications such as Coumadin or Plavix 5 days prior to procedure unless your doctor said to continue these medications. Call your doctor if a new medication is prescribed in this class.     Restrictions for eating before procedure:   · If you are getting procedural sedation, then do not eat to for 8 hours prior to procedure appointment time. Do not drink fluids for four hours prior to your procedure time.   · If you are not having procedural sedation, then Skip the meal prior to your procedure. If you have a morning procedure then skip breakfast. If you have an afternoon procedure then skip lunch.   · You may drink clear liquids up to 2 hours prior to your procedure  · You must have a  the day of procedure to accompany you home.      POST PROCEDURE INSTRUCTIONS   · No heavy lifting, strenuous bending or  strenuous exercise for 3 days after your procedure.  · No hot tubs, baths, swimming for 3 days after your procedure  · You can remove the bandage the day after the procedure.  · IF YOU RECEIVED A STEROID INJECTION. PLEASE NOTE THAT THERE MAY BE A DELAY FOR THE INJECTION TO START WORKING, THE DELAY MAY BE UP TO TWO WEEKS. IF YOU HAVE DIABETES, PLEASE NOTE THAT YOUR SUGAR LEVELS MAY BE ELEVATED FOR 1-2 DAYS AFTER A STEROID INJECTION.  · IF YOU EXPERIENCE PROLONGED WEAKNESS LONGER THAN ONE DAY, BOWEL OR BLADDER INCONTINENCE THEN PLEASE CALL THE PHYSIATRY OFFICE.  · Your leg may feel heavy, weak and numb for up to 1-2 days. Be very careful walking.   ·  You may resume normal activities 3 days after procedure.

## 2019-10-16 NOTE — PROGRESS NOTES
Follow up patient note  Interventional spine and sports physiatry, Physical medicine rehabilitation      Chief complaint:   Chief Complaint   Patient presents with   • Follow-Up     Neck pain         HISTORY    Please see new patient note by Dr Ordonez,  for more details.     HPI  Patient identification: Belgica Cesar 28 y.o. female  With Diagnoses of Cervical radiculitis , Cervical disc herniation, Left arm numbness, Cervical radiculopathy, and Chronic neck pain were pertinent to this visit.       -The patient continues to have left-sided neck pain radiating to the left shoulder and occasionally down the arm with associated numbness, 7-9/10 in intensity.  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  Improved with gabapentin but she was only able to tolerate this at night.  She is also tried NSAIDs, Tylenol with       ROS Red Flags :   Fever, Chills, Sweats: Denies  Involuntary Weight Loss: Denies  Bowel/Bladder Incontinence: Denies  Saddle Anesthesia: Denies        PMHx:   Past Medical History:   Diagnosis Date   • Asthma    • Migraines        PSHx:   History reviewed. No pertinent surgical history.    Family history   Family History   Problem Relation Age of Onset   • Hypertension Mother    • No Known Problems Father          Medications:   No outpatient medications have been marked as taking for the 10/16/19 encounter (Office Visit) with Gilles Ordonez M.D..        Current Outpatient Medications on File Prior to Visit   Medication Sig Dispense Refill   • meloxicam (MOBIC) 15 MG tablet Take 1 Tab by mouth every day for 30 days. For 30 days then stop. Do not take other NSAIDs. No refills. 30 Tab 0   • gabapentin (NEURONTIN) 300 MG Cap Take 1 Cap by mouth at bedtime as needed (pain). 30 Cap 0   • desogestrel-ethinyl estradiol (APRI) 0.15-30 MG-MCG per tablet Take 1 Tab by mouth every bedtime. (Patient not taking: Reported on 8/27/2019) 28 Tab 11   • omeprazole (PRILOSEC)  20 MG delayed-release capsule Take 20 mg by mouth every day.       No current facility-administered medications on file prior to visit.          Allergies:   Allergies   Allergen Reactions   • Pcn [Penicillins] Hives       Social Hx:   Social History     Socioeconomic History   • Marital status: Single     Spouse name: Not on file   • Number of children: Not on file   • Years of education: Not on file   • Highest education level: Not on file   Occupational History   • Not on file   Social Needs   • Financial resource strain: Not on file   • Food insecurity:     Worry: Not on file     Inability: Not on file   • Transportation needs:     Medical: Not on file     Non-medical: Not on file   Tobacco Use   • Smoking status: Never Smoker   • Smokeless tobacco: Never Used   Substance and Sexual Activity   • Alcohol use: No   • Drug use: No   • Sexual activity: Yes     Partners: Male     Birth control/protection: Pill   Lifestyle   • Physical activity:     Days per week: Not on file     Minutes per session: Not on file   • Stress: Not on file   Relationships   • Social connections:     Talks on phone: Not on file     Gets together: Not on file     Attends [employer] service: Not on file     Active member of club or organization: Not on file     Attends meetings of clubs or organizations: Not on file     Relationship status: Not on file   • Intimate partner violence:     Fear of current or ex partner: Not on file     Emotionally abused: Not on file     Physically abused: Not on file     Forced sexual activity: Not on file   Other Topics Concern   •  Service No   • Blood Transfusions No   • Caffeine Concern No   • Occupational Exposure No   • Hobby Hazards No   • Sleep Concern No   • Stress Concern No   • Weight Concern No   • Special Diet No   • Back Care No   • Exercise Yes   • Bike Helmet Yes   • Seat Belt Yes   • Self-Exams Yes   Social History Narrative   • Not on file         EXAMINATION     Physical Exam:  "  Vitals: /62 (BP Location: Left arm, Patient Position: Sitting, BP Cuff Size: Adult)   Pulse 98   Temp 36.8 °C (98.2 °F) (Temporal)   Ht 1.626 m (5' 4\")   Wt 57.6 kg (126 lb 15.8 oz)   SpO2 (!) 68%     Constitutional:   Body Habitus: Body mass index is 21.8 kg/m².  Cooperation: Fully cooperates with exam  Appearance: Well-groomed no disheveled    Respiratory-  breathing comfortable on room air, no audible wheezing  Cardiovascular- capillary refills less than 2 seconds. No lower extremity edema is noted.   Psychiatric- alert and oriented ×3. Normal affect.    MSK: -     Key points for the international standards for neurological classification of spinal cord injury (ISNCSCI) to light touch.     Dermatome R L   C4 2 2   C5 2 2   C6 2 2   C7 2 2   C8 2 2   T1 2 2   T2 2 2                                         Motor Exam Upper Extremities   ? Myotome R L   Shoulder flexion C5 5 5   Elbow flexion C5 5 5   Wrist extension C6 5 5   Elbow extension C7 5 5   Finger flexion C8 5 5   Finger abduction T1 5 5           MEDICAL DECISION MAKING    DATA    Labs:   Lab Results   Component Value Date/Time    SODIUM 139 03/15/2018 12:12 PM    POTASSIUM 4.1 03/15/2018 12:12 PM    CHLORIDE 107 03/15/2018 12:12 PM    CO2 25 03/15/2018 12:12 PM    GLUCOSE 80 09/05/2019 09:03 AM    BUN 12 03/15/2018 12:12 PM    CREATININE 0.88 03/15/2018 12:12 PM        No results found for: PROTHROMBTM, INR     Lab Results   Component Value Date/Time    WBC 10.9 (H) 10/11/2017 07:57 PM    RBC 4.37 10/11/2017 07:57 PM    HEMOGLOBIN 13.4 10/11/2017 07:57 PM    HEMATOCRIT 39.5 10/11/2017 07:57 PM    MCV 90.4 10/11/2017 07:57 PM    MCH 30.7 10/11/2017 07:57 PM    MCHC 33.9 10/11/2017 07:57 PM    MPV 11.4 10/11/2017 07:57 PM    NEUTSPOLYS 54.20 10/11/2017 07:57 PM    LYMPHOCYTES 34.40 10/11/2017 07:57 PM    MONOCYTES 8.30 10/11/2017 07:57 PM    EOSINOPHILS 2.40 10/11/2017 07:57 PM    BASOPHILS 0.50 10/11/2017 07:57 PM        No results found for: " HBA1C       Imaging:   I personally reviewed following images  MRI cervical spine 10/14/119  At C5-6 there is a disc herniation in the left paracentric region with neuroforaminal stenosis on the left side C5-6.  No significant central canal stenosis.    I reviewed the following radiology reports                                 Results for orders placed during the hospital encounter of 10/14/19   MR-CERVICAL SPINE-W/O    Impression 1. Disc desiccation at C5-6 with annular fissure and posterior broad-based disc protrusion along the left lateral recess and left neuroforamen. Mild narrowing of the left lateral recess and left neuroforamen. No spinal canal narrowing. The left C6 nerve   root is likely impinged.    2. Unremarkable appearance at other levels.                                                                Results for orders placed in visit on 08/27/19   DX-CERVICAL SPINE-2 OR 3 VIEWS    Impression Normal cervical spine series.                                                                                  Results for orders placed during the hospital encounter of 07/17/18   DX-WRIST-COMPLETE 3+ LEFT    Impression No acute osseous abnormality.           DIAGNOSIS   Visit Diagnoses     ICD-10-CM   1. Cervical radiculitis  M54.12   2. Cervical disc herniation M50.20   3. Left arm numbness R20.0   4. Cervical radiculopathy M54.12   5. Chronic neck pain M54.2    G89.29         ASSESSMENT and PLAN:     Belgica Cesar 28 y.o. female      Belgica was seen today for follow-up.    Diagnoses and all orders for this visit:    Cervical radiculitis   -     gabapentin (NEURONTIN) 300 MG Cap; Take 1 Cap by mouth at bedtime as needed (pain).    Cervical disc herniation    Left arm numbness  -     gabapentin (NEURONTIN) 300 MG Cap; Take 1 Cap by mouth at bedtime as needed (pain).    Cervical radiculopathy  -     REFERRAL TO PHYSICIAL MEDICINE REHAB    Chronic neck pain  -     gabapentin (NEURONTIN) 300 MG Cap; Take 1 Cap  by mouth at bedtime as needed (pain).    Cervical radiculitis, likely mid to lower left  -     gabapentin (NEURONTIN) 300 MG Cap; Take 1 Cap by mouth at bedtime as needed (pain).      The patient's symptoms are secondary to a left C5-6 disc protrusion with cervical radiculopathy, failed conservative treatments of medication management, exercise.  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  I have ordered a C7-T1 interlaminar epidural steroid injection with fluoroscopic guidance and sedation.    -The risks benefits and alternatives to this procedure were discussed and the patient wishes to proceed with the procedure. Risks include but are not limited to damage to surrounding structures, infection, bleeding, worsening of pain which can be permanent, weakness which can be permanent. Benefits include pain relief, improved function. Alternatives includes not doing the procedure.       We discussed stopping NSAIDs including meloxicam 5 days prior to the above procedure.  She will continue gabapentin and Tylenol in the interim.    Follow up: 2 weeks after the procedure    Thank you for allowing me to participate in the care of this patient. If you have any questions please not hesitate to contact me.            Please note that this dictation was created using voice recognition software. I have made every reasonable attempt to correct obvious errors but there may be errors of grammar and content that I may have overlooked prior to finalization of this note.      Gilles Ordonez MD  Interventional Spine and Sports Physiatry  Physical Medicine and Rehabilitation  [facility]

## 2019-10-21 ENCOUNTER — HOSPITAL ENCOUNTER (OUTPATIENT)
Dept: PAIN MANAGEMENT | Facility: REHABILITATION | Age: 29
End: 2019-10-21
Attending: PHYSICAL MEDICINE & REHABILITATION
Payer: MEDICAID

## 2019-10-29 ENCOUNTER — APPOINTMENT (OUTPATIENT)
Dept: PHYSICAL MEDICINE AND REHAB | Facility: MEDICAL CENTER | Age: 29
End: 2019-10-29
Payer: MEDICAID

## 2019-11-04 ENCOUNTER — HOSPITAL ENCOUNTER (OUTPATIENT)
Dept: RADIOLOGY | Facility: REHABILITATION | Age: 29
End: 2019-11-04
Attending: PHYSICAL MEDICINE & REHABILITATION

## 2019-11-04 ENCOUNTER — HOSPITAL ENCOUNTER (OUTPATIENT)
Dept: PAIN MANAGEMENT | Facility: REHABILITATION | Age: 29
End: 2019-11-04
Attending: PHYSICAL MEDICINE & REHABILITATION
Payer: MEDICAID

## 2019-11-04 VITALS
RESPIRATION RATE: 18 BRPM | SYSTOLIC BLOOD PRESSURE: 124 MMHG | DIASTOLIC BLOOD PRESSURE: 76 MMHG | TEMPERATURE: 98.6 F | OXYGEN SATURATION: 97 % | HEART RATE: 55 BPM | BODY MASS INDEX: 20.74 KG/M2 | WEIGHT: 121.47 LBS | HEIGHT: 64 IN

## 2019-11-04 PROCEDURE — 99152 MOD SED SAME PHYS/QHP 5/>YRS: CPT

## 2019-11-04 PROCEDURE — 700117 HCHG RX CONTRAST REV CODE 255

## 2019-11-04 PROCEDURE — 62321 NJX INTERLAMINAR CRV/THRC: CPT

## 2019-11-04 PROCEDURE — 700111 HCHG RX REV CODE 636 W/ 250 OVERRIDE (IP)

## 2019-11-04 RX ORDER — MIDAZOLAM HYDROCHLORIDE 1 MG/ML
INJECTION INTRAMUSCULAR; INTRAVENOUS
Status: COMPLETED
Start: 2019-11-04 | End: 2019-11-04

## 2019-11-04 RX ORDER — DEXAMETHASONE SODIUM PHOSPHATE 10 MG/ML
INJECTION, SOLUTION INTRAMUSCULAR; INTRAVENOUS
Status: COMPLETED
Start: 2019-11-04 | End: 2019-11-04

## 2019-11-04 RX ORDER — LIDOCAINE HYDROCHLORIDE 10 MG/ML
INJECTION, SOLUTION EPIDURAL; INFILTRATION; INTRACAUDAL; PERINEURAL
Status: COMPLETED
Start: 2019-11-04 | End: 2019-11-04

## 2019-11-04 RX ADMIN — IOHEXOL 1 ML: 240 INJECTION, SOLUTION INTRATHECAL; INTRAVASCULAR; INTRAVENOUS; ORAL at 15:30

## 2019-11-04 RX ADMIN — DEXAMETHASONE SODIUM PHOSPHATE 10 MG: 10 INJECTION, SOLUTION INTRAMUSCULAR; INTRAVENOUS at 15:31

## 2019-11-04 RX ADMIN — LIDOCAINE HYDROCHLORIDE 10 ML: 10 INJECTION, SOLUTION EPIDURAL; INFILTRATION; INTRACAUDAL; PERINEURAL at 15:27

## 2019-11-04 NOTE — PROCEDURES
Date of Service: 11/4/2019    Physician/s: Gilles Ordonez MD    Pre-operative Diagnosis: Cervical radiculopathy    Post-operative Diagnosis: Cervical radiculopathy    Procedure: C7-T1  Cervical interlaminar epidural steroid injection    Description of procedure:    The risks, benefits, and alternatives of the procedure were reviewed and discussed with the patient.  Written informed consent was freely obtained. A pre-procedural time-out was conducted by the physician verifying patient’s identity, procedure to be performed, procedure site and side, and allergy verification. Appropriate equipment was determined to be in place for the procedure.       The patient's vital signs were carefully monitored before, throughout, and after the procedure.     In the fluoroscopy suite the patient was placed in a prone position, a pillow placed underneath their chest. The skin was prepped and draped in the usual sterile fashion. The fluoroscope was placed over the cervical neck at the appropriate injection AP angle view, and the target for injection was marked. A 25g needle was placed into the marked site, and approx 2cc of 1% Lidocaine was injected subcutaneously into the epidermal and dermal layers. The needle was removed. A 20g Tuohy needle was then placed and advanced under fluoroscopic guidance into the LEFT  C7-T1 interlaminar space at both the initial position AP view and contralateral oblique at a lateral view to ensure proper location of the needle tip at all times.  The needle was advanced with fluoroscopic guidance to the superior aspect of the T1 lamina.  Then a contralateral oblique view was used to advance the needle slightly towards the epidural space, A loss-of-resistance technique was used to guide the needle into the epidural space in a lateral fluoroscopic view and confirmed with loss of resistance with sterile normal saline. In the AP and lateral views, contrast dye was used.  to highlight the epidural space  spread while the fluoroscope was running live. Following negative aspiration, 1mL of 10mg/mL of dexamethasone followed by 2 mL of sterile saline . The needle was removed intact after restyleted. The patient's back was covered with a 4x4 gauze, the area was cleansed with sterile normal saline, and a dressing was applied. There were no complications noted.     The patient was then evaluated post-procedure, and was hemodynamically stable prior to leaving the post-operative care unit.     Gilles Ordonez MD  Physical Medicine and Rehabilitation  Interventional Spine and Sports Physiatry  [facility]        CPT  interlaminar cervical epidural: 16842

## 2019-11-04 NOTE — NON-PROVIDER
Pt given verbal and written d/c instructions and verbalizes understanding. denies nsaid use in last 3 days, denies blood thinners use in last 5 days, denies current infection or abx use. Med rec complete. Pt has post procedural ride home with friend Yi Perez.

## 2019-11-04 NOTE — NON-PROVIDER
Patient identified, medication allergies,procedure confirmed,  pertinent medical history . Site marked by Dr. Ordonez. Positioned patient by RN,ST, & X- ray Tech.  Both hands tucked under both hips, lower extremities & feet placed pillow for support.

## 2019-11-06 ENCOUNTER — TELEPHONE (OUTPATIENT)
Dept: PHYSICAL MEDICINE AND REHAB | Facility: MEDICAL CENTER | Age: 29
End: 2019-11-06

## 2019-11-07 NOTE — TELEPHONE ENCOUNTER
DIXON to call us back to give us an update after the SP that was done with Dr. Ordonez dated 11/4/19 for her C7-T1 interlaminar epidural steroid injection with sedation.    Thank you  Ashley

## 2019-11-25 ENCOUNTER — OFFICE VISIT (OUTPATIENT)
Dept: PHYSICAL MEDICINE AND REHAB | Facility: MEDICAL CENTER | Age: 29
End: 2019-11-25
Payer: MEDICAID

## 2019-11-25 VITALS
DIASTOLIC BLOOD PRESSURE: 72 MMHG | HEIGHT: 64 IN | OXYGEN SATURATION: 98 % | WEIGHT: 126.54 LBS | HEART RATE: 87 BPM | TEMPERATURE: 97.9 F | SYSTOLIC BLOOD PRESSURE: 102 MMHG | BODY MASS INDEX: 21.6 KG/M2

## 2019-11-25 DIAGNOSIS — M50.20 CERVICAL DISC HERNIATION: ICD-10-CM

## 2019-11-25 DIAGNOSIS — G89.29 CHRONIC NECK PAIN: ICD-10-CM

## 2019-11-25 DIAGNOSIS — M54.12 CERVICAL RADICULOPATHY: ICD-10-CM

## 2019-11-25 DIAGNOSIS — M54.12 CERVICAL RADICULITIS: ICD-10-CM

## 2019-11-25 DIAGNOSIS — R20.0 LEFT ARM NUMBNESS: ICD-10-CM

## 2019-11-25 DIAGNOSIS — M54.2 CHRONIC NECK PAIN: ICD-10-CM

## 2019-11-25 DIAGNOSIS — G43.009 MIGRAINE WITHOUT AURA AND WITHOUT STATUS MIGRAINOSUS, NOT INTRACTABLE: ICD-10-CM

## 2019-11-25 PROCEDURE — 99214 OFFICE O/P EST MOD 30 MIN: CPT | Performed by: PHYSICAL MEDICINE & REHABILITATION

## 2019-11-25 RX ORDER — ACETAMINOPHEN 500 MG
1000 TABLET ORAL 3 TIMES DAILY PRN
Qty: 180 TAB | Refills: 6 | Status: SHIPPED | OUTPATIENT
Start: 2019-11-25 | End: 2020-02-10

## 2019-11-25 ASSESSMENT — PATIENT HEALTH QUESTIONNAIRE - PHQ9
1. LITTLE INTEREST OR PLEASURE IN DOING THINGS: 0
3. TROUBLE FALLING OR STAYING ASLEEP OR SLEEPING TOO MUCH: 0
2. FEELING DOWN, DEPRESSED, IRRITABLE, OR HOPELESS: 0
8. MOVING OR SPEAKING SO SLOWLY THAT OTHER PEOPLE COULD HAVE NOTICED. OR THE OPPOSITE, BEING SO FIGETY OR RESTLESS THAT YOU HAVE BEEN MOVING AROUND A LOT MORE THAN USUAL: 0
SUM OF ALL RESPONSES TO PHQ QUESTIONS 1-9: 0
5. POOR APPETITE OR OVEREATING: 0
6. FEELING BAD ABOUT YOURSELF - OR THAT YOU ARE A FAILURE OR HAVE LET YOURSELF OR YOUR FAMILY DOWN: 0
7. TROUBLE CONCENTRATING ON THINGS, SUCH AS READING THE NEWSPAPER OR WATCHING TELEVISION: 0
4. FEELING TIRED OR HAVING LITTLE ENERGY: 0
9. THOUGHTS THAT YOU WOULD BE BETTER OFF DEAD, OR OF HURTING YOURSELF: 0
SUM OF ALL RESPONSES TO PHQ9 QUESTIONS 1 AND 2: 0

## 2019-11-25 ASSESSMENT — PAIN SCALES - GENERAL: PAINLEVEL: 3=SLIGHT PAIN

## 2019-11-25 NOTE — Clinical Note
Dear Catalina Andres, HANK , Thank you for the referral of Belgica Cesar.  Patient had a significant improvement in her cervical radiculopathy after a C7-T1 interlaminar epidural steroid injection.  The patient can follow-up with me as needed.  Please see my note for more details Should you have any questions or concerns please do not hesitate to contact me. Gilles Ordonez M.D.

## 2019-11-26 NOTE — PROGRESS NOTES
Follow up patient note  Interventional spine and sports physiatry, Physical medicine rehabilitation      Chief complaint:   Chief Complaint   Patient presents with   • Follow-Up     Neck pain         HISTORY    Please see new patient note by Dr Ordonez,  for more details.     HPI  Patient identification: Belgica Cesar 28 y.o. female  With Diagnoses of Cervical radiculitis , Cervical disc herniation, Left arm numbness, Cervical radiculopathy, Chronic neck pain, and Migraine without aura and without status migrainosus, not intractable were pertinent to this visit.   Procedures:  11/4/2019: C7-T1 interlaminar epidural steroid injection with significant improvement post procedure    Overall improvement in pain, intermittent pain in the left triceps. Now with numbness but no pain in the arm and hand. Improved ROM in the cervical spine after the epidural.      she is getting bilateral headaches, associated with tearing and redness of the right eye. Excedrin helps. Lasts from 1 hours to most of the day. She does have a history of migraines.          ROS Red Flags :   Fever, Chills, Sweats: Denies  Involuntary Weight Loss: Denies  Bowel/Bladder Incontinence: Denies  Saddle Anesthesia: Denies        PMHx:   Past Medical History:   Diagnosis Date   • Asthma    • Migraines        PSHx:   History reviewed. No pertinent surgical history.    Family history   Family History   Problem Relation Age of Onset   • Hypertension Mother    • No Known Problems Father          Medications:   Outpatient Medications Marked as Taking for the 11/25/19 encounter (Office Visit) with Gilles Ordonez M.D.   Medication Sig Dispense Refill   • acetaminophen (TYLENOL) 500 MG Tab Take 2 Tabs by mouth 3 times a day as needed (pain.  Do not exceed 3000 mg of total acetaminophen per day). 180 Tab 6        No current outpatient medications on file prior to visit.     No current facility-administered medications on file prior to visit.          Allergies:    Allergies   Allergen Reactions   • Pcn [Penicillins] Hives   • Clindamycin      rash       Social Hx:   Social History     Socioeconomic History   • Marital status: Single     Spouse name: Not on file   • Number of children: Not on file   • Years of education: Not on file   • Highest education level: Not on file   Occupational History   • Not on file   Social Needs   • Financial resource strain: Not on file   • Food insecurity:     Worry: Not on file     Inability: Not on file   • Transportation needs:     Medical: Not on file     Non-medical: Not on file   Tobacco Use   • Smoking status: Never Smoker   • Smokeless tobacco: Never Used   Substance and Sexual Activity   • Alcohol use: No   • Drug use: No   • Sexual activity: Yes     Partners: Male     Birth control/protection: Pill   Lifestyle   • Physical activity:     Days per week: Not on file     Minutes per session: Not on file   • Stress: Not on file   Relationships   • Social connections:     Talks on phone: Not on file     Gets together: Not on file     Attends Scientologist service: Not on file     Active member of club or organization: Not on file     Attends meetings of clubs or organizations: Not on file     Relationship status: Not on file   • Intimate partner violence:     Fear of current or ex partner: Not on file     Emotionally abused: Not on file     Physically abused: Not on file     Forced sexual activity: Not on file   Other Topics Concern   •  Service No   • Blood Transfusions No   • Caffeine Concern No   • Occupational Exposure No   • Hobby Hazards No   • Sleep Concern No   • Stress Concern No   • Weight Concern No   • Special Diet No   • Back Care No   • Exercise Yes   • Bike Helmet Yes   • Seat Belt Yes   • Self-Exams Yes   Social History Narrative   • Not on file         EXAMINATION     Physical Exam:   Vitals: /72 (BP Location: Right arm, Patient Position: Sitting, BP Cuff Size: Adult)   Pulse 87   Temp 36.6 °C (97.9 °F)  "(Temporal)   Ht 1.626 m (5' 4\")   Wt 57.4 kg (126 lb 8.7 oz)   SpO2 98%     Constitutional:   Body Habitus: Body mass index is 21.72 kg/m².  Cooperation: Fully cooperates with exam  Appearance: Well-groomed no disheveled    Respiratory-  breathing comfortable on room air, no audible wheezing  Cardiovascular- capillary refills less than 2 seconds. No lower extremity edema is noted.   Psychiatric- alert and oriented ×3. Normal affect.    MSK: -     There are no signs of infection around the injection sites.         Key points for the international standards for neurological classification of spinal cord injury (ISNCSCI) to light touch.     Dermatome R L   C4 2 2   C5 2 2   C6 2 2   C7 2 2   C8 2 2   T1 2 2   T2 2 2                                         Motor Exam Upper Extremities   ? Myotome R L   Shoulder flexion C5 5 5   Elbow flexion C5 5 5   Wrist extension C6 5 5   Elbow extension C7 5 5   Finger flexion C8 5 5   Finger abduction T1 5 5             MEDICAL DECISION MAKING    DATA    Labs:   Lab Results   Component Value Date/Time    SODIUM 139 03/15/2018 12:12 PM    POTASSIUM 4.1 03/15/2018 12:12 PM    CHLORIDE 107 03/15/2018 12:12 PM    CO2 25 03/15/2018 12:12 PM    GLUCOSE 80 09/05/2019 09:03 AM    BUN 12 03/15/2018 12:12 PM    CREATININE 0.88 03/15/2018 12:12 PM        No results found for: PROTHROMBTM, INR     Lab Results   Component Value Date/Time    WBC 10.9 (H) 10/11/2017 07:57 PM    RBC 4.37 10/11/2017 07:57 PM    HEMOGLOBIN 13.4 10/11/2017 07:57 PM    HEMATOCRIT 39.5 10/11/2017 07:57 PM    MCV 90.4 10/11/2017 07:57 PM    MCH 30.7 10/11/2017 07:57 PM    MCHC 33.9 10/11/2017 07:57 PM    MPV 11.4 10/11/2017 07:57 PM    NEUTSPOLYS 54.20 10/11/2017 07:57 PM    LYMPHOCYTES 34.40 10/11/2017 07:57 PM    MONOCYTES 8.30 10/11/2017 07:57 PM    EOSINOPHILS 2.40 10/11/2017 07:57 PM    BASOPHILS 0.50 10/11/2017 07:57 PM        No results found for: HBA1C       Imaging:   I personally reviewed following " images  MRI cervical spine 10/14/119  At C5-6 there is a disc herniation in the left paracentric region with neuroforaminal stenosis on the left side C5-6.  No significant central canal stenosis.    I reviewed the following radiology reports                                 Results for orders placed during the hospital encounter of 10/14/19   MR-CERVICAL SPINE-W/O    Impression 1. Disc desiccation at C5-6 with annular fissure and posterior broad-based disc protrusion along the left lateral recess and left neuroforamen. Mild narrowing of the left lateral recess and left neuroforamen. No spinal canal narrowing. The left C6 nerve   root is likely impinged.    2. Unremarkable appearance at other levels.                                                                Results for orders placed in visit on 08/27/19   DX-CERVICAL SPINE-2 OR 3 VIEWS    Impression Normal cervical spine series.                                                                                  Results for orders placed during the hospital encounter of 07/17/18   DX-WRIST-COMPLETE 3+ LEFT    Impression No acute osseous abnormality.           DIAGNOSIS   Visit Diagnoses     ICD-10-CM   1. Cervical radiculitis  M54.12   2. Cervical disc herniation M50.20   3. Left arm numbness R20.0   4. Cervical radiculopathy M54.12   5. Chronic neck pain M54.2    G89.29   6. Migraine without aura and without status migrainosus, not intractable G43.009         ASSESSMENT and PLAN:     Belgica Cesar 28 y.o. female      Belgica was seen today for follow-up.    Diagnoses and all orders for this visit:    Cervical radiculitis   Comments:  Improved significantly after the epidural  Orders:  -     REFERRAL TO PHYSICAL THERAPY Reason for Therapy: Eval/Treat/Report  -     acetaminophen (TYLENOL) 500 MG Tab; Take 2 Tabs by mouth 3 times a day as needed (pain.  Do not exceed 3000 mg of total acetaminophen per day).    Cervical disc herniation  Comments:  Improved  significantly after the epidural  Orders:  -     REFERRAL TO PHYSICAL THERAPY Reason for Therapy: Eval/Treat/Report  -     acetaminophen (TYLENOL) 500 MG Tab; Take 2 Tabs by mouth 3 times a day as needed (pain.  Do not exceed 3000 mg of total acetaminophen per day).    Left arm numbness  Comments:  Improved significantly after the epidural  Orders:  -     REFERRAL TO PHYSICAL THERAPY Reason for Therapy: Eval/Treat/Report  -     acetaminophen (TYLENOL) 500 MG Tab; Take 2 Tabs by mouth 3 times a day as needed (pain.  Do not exceed 3000 mg of total acetaminophen per day).    Cervical radiculopathy  Comments:  Improved significantly after the epidural  Orders:  -     REFERRAL TO PHYSICAL THERAPY Reason for Therapy: Eval/Treat/Report  -     acetaminophen (TYLENOL) 500 MG Tab; Take 2 Tabs by mouth 3 times a day as needed (pain.  Do not exceed 3000 mg of total acetaminophen per day).    Chronic neck pain  -     REFERRAL TO PHYSICAL THERAPY Reason for Therapy: Eval/Treat/Report  -     acetaminophen (TYLENOL) 500 MG Tab; Take 2 Tabs by mouth 3 times a day as needed (pain.  Do not exceed 3000 mg of total acetaminophen per day).    Migraine without aura and without status migrainosus, not intractable  Comments:  Recently with a flare of migraines.  Patient declined medications.  Consider Botox if she fits criteria in the future  Orders:  -     acetaminophen (TYLENOL) 500 MG Tab; Take 2 Tabs by mouth 3 times a day as needed (pain.  Do not exceed 3000 mg of total acetaminophen per day).        Discontinue gabapentin.  Discontinue meloxicam.  Given improvement the patient can use ibuprofen as needed and I counseled her against using NSAIDs constantly.  Instead ideally the patient would use Tylenol as needed as above.      Follow up: PRN with me    Thank you for allowing me to participate in the care of this patient. If you have any questions please not hesitate to contact me.            Please note that this dictation was created  using voice recognition software. I have made every reasonable attempt to correct obvious errors but there may be errors of grammar and content that I may have overlooked prior to finalization of this note.      Gilles Ordonez MD  Interventional Spine and Sports Physiatry  Physical Medicine and Rehabilitation  [facility]

## 2020-02-10 ENCOUNTER — HOSPITAL ENCOUNTER (OUTPATIENT)
Facility: MEDICAL CENTER | Age: 30
End: 2020-02-11
Attending: EMERGENCY MEDICINE | Admitting: INTERNAL MEDICINE
Payer: COMMERCIAL

## 2020-02-10 ENCOUNTER — APPOINTMENT (OUTPATIENT)
Dept: RADIOLOGY | Facility: MEDICAL CENTER | Age: 30
End: 2020-02-10
Attending: EMERGENCY MEDICINE
Payer: COMMERCIAL

## 2020-02-10 DIAGNOSIS — N17.9 AKI (ACUTE KIDNEY INJURY) (HCC): ICD-10-CM

## 2020-02-10 DIAGNOSIS — R10.9 ACUTE ABDOMINAL PAIN: ICD-10-CM

## 2020-02-10 DIAGNOSIS — N83.201 RIGHT OVARIAN CYST: ICD-10-CM

## 2020-02-10 DIAGNOSIS — E86.0 DEHYDRATION: ICD-10-CM

## 2020-02-10 DIAGNOSIS — R10.9 BILATERAL FLANK PAIN: ICD-10-CM

## 2020-02-10 DIAGNOSIS — D72.829 LEUKOCYTOSIS, UNSPECIFIED TYPE: ICD-10-CM

## 2020-02-10 DIAGNOSIS — R18.8 FREE FLUID IN PELVIS: ICD-10-CM

## 2020-02-10 LAB
ALBUMIN SERPL BCP-MCNC: 4.4 G/DL (ref 3.2–4.9)
ALBUMIN/GLOB SERPL: 1.8 G/DL
ALP SERPL-CCNC: 91 U/L (ref 30–99)
ALT SERPL-CCNC: 14 U/L (ref 2–50)
ANION GAP SERPL CALC-SCNC: 14 MMOL/L (ref 0–11.9)
APPEARANCE UR: CLEAR
AST SERPL-CCNC: 30 U/L (ref 12–45)
BILIRUB SERPL-MCNC: 0.6 MG/DL (ref 0.1–1.5)
BILIRUB UR QL STRIP.AUTO: NEGATIVE
BUN SERPL-MCNC: 24 MG/DL (ref 8–22)
CALCIUM SERPL-MCNC: 9.6 MG/DL (ref 8.4–10.2)
CHLORIDE SERPL-SCNC: 100 MMOL/L (ref 96–112)
CO2 SERPL-SCNC: 23 MMOL/L (ref 20–33)
COLOR UR: YELLOW
CREAT SERPL-MCNC: 1.59 MG/DL (ref 0.5–1.4)
D DIMER PPP IA.FEU-MCNC: <0.4 UG/ML (FEU) (ref 0–0.5)
ERYTHROCYTE [DISTWIDTH] IN BLOOD BY AUTOMATED COUNT: 39.8 FL (ref 35.9–50)
GLOBULIN SER CALC-MCNC: 2.4 G/DL (ref 1.9–3.5)
GLUCOSE SERPL-MCNC: 111 MG/DL (ref 65–99)
GLUCOSE UR STRIP.AUTO-MCNC: NEGATIVE MG/DL
HCG SERPL QL: NEGATIVE
HCT VFR BLD AUTO: 43.5 % (ref 37–47)
HGB BLD-MCNC: 14.8 G/DL (ref 12–16)
KETONES UR STRIP.AUTO-MCNC: NEGATIVE MG/DL
LEUKOCYTE ESTERASE UR QL STRIP.AUTO: NEGATIVE
LIPASE SERPL-CCNC: 27 U/L (ref 7–58)
MCH RBC QN AUTO: 31.3 PG (ref 27–33)
MCHC RBC AUTO-ENTMCNC: 34 G/DL (ref 33.6–35)
MCV RBC AUTO: 92 FL (ref 81.4–97.8)
MICRO URNS: NORMAL
NITRITE UR QL STRIP.AUTO: NEGATIVE
PH UR STRIP.AUTO: 6 [PH] (ref 5–8)
PLATELET # BLD AUTO: 190 K/UL (ref 164–446)
PMV BLD AUTO: 11.3 FL (ref 9–12.9)
POTASSIUM SERPL-SCNC: 4 MMOL/L (ref 3.6–5.5)
PROT SERPL-MCNC: 6.8 G/DL (ref 6–8.2)
PROT UR QL STRIP: NEGATIVE MG/DL
RBC # BLD AUTO: 4.73 M/UL (ref 4.2–5.4)
RBC UR QL AUTO: NEGATIVE
SODIUM SERPL-SCNC: 137 MMOL/L (ref 135–145)
SP GR UR STRIP.AUTO: <=1.005
WBC # BLD AUTO: 15.9 K/UL (ref 4.8–10.8)

## 2020-02-10 PROCEDURE — 99285 EMERGENCY DEPT VISIT HI MDM: CPT

## 2020-02-10 PROCEDURE — 84703 CHORIONIC GONADOTROPIN ASSAY: CPT

## 2020-02-10 PROCEDURE — 85379 FIBRIN DEGRADATION QUANT: CPT

## 2020-02-10 PROCEDURE — 81003 URINALYSIS AUTO W/O SCOPE: CPT

## 2020-02-10 PROCEDURE — 36415 COLL VENOUS BLD VENIPUNCTURE: CPT

## 2020-02-10 PROCEDURE — 700105 HCHG RX REV CODE 258: Performed by: EMERGENCY MEDICINE

## 2020-02-10 PROCEDURE — 80053 COMPREHEN METABOLIC PANEL: CPT

## 2020-02-10 PROCEDURE — 700111 HCHG RX REV CODE 636 W/ 250 OVERRIDE (IP): Performed by: EMERGENCY MEDICINE

## 2020-02-10 PROCEDURE — 96375 TX/PRO/DX INJ NEW DRUG ADDON: CPT

## 2020-02-10 PROCEDURE — 96374 THER/PROPH/DIAG INJ IV PUSH: CPT

## 2020-02-10 PROCEDURE — 83690 ASSAY OF LIPASE: CPT

## 2020-02-10 PROCEDURE — 85027 COMPLETE CBC AUTOMATED: CPT

## 2020-02-10 RX ORDER — ONDANSETRON 2 MG/ML
4 INJECTION INTRAMUSCULAR; INTRAVENOUS ONCE
Status: COMPLETED | OUTPATIENT
Start: 2020-02-10 | End: 2020-02-10

## 2020-02-10 RX ORDER — SODIUM CHLORIDE, SODIUM LACTATE, POTASSIUM CHLORIDE, CALCIUM CHLORIDE 600; 310; 30; 20 MG/100ML; MG/100ML; MG/100ML; MG/100ML
1000 INJECTION, SOLUTION INTRAVENOUS ONCE
Status: COMPLETED | OUTPATIENT
Start: 2020-02-11 | End: 2020-02-11

## 2020-02-10 RX ORDER — HYDROMORPHONE HYDROCHLORIDE 1 MG/ML
0.5 INJECTION, SOLUTION INTRAMUSCULAR; INTRAVENOUS; SUBCUTANEOUS ONCE
Status: COMPLETED | OUTPATIENT
Start: 2020-02-10 | End: 2020-02-10

## 2020-02-10 RX ORDER — SODIUM CHLORIDE, SODIUM LACTATE, POTASSIUM CHLORIDE, CALCIUM CHLORIDE 600; 310; 30; 20 MG/100ML; MG/100ML; MG/100ML; MG/100ML
1000 INJECTION, SOLUTION INTRAVENOUS ONCE
Status: COMPLETED | OUTPATIENT
Start: 2020-02-10 | End: 2020-02-11

## 2020-02-10 RX ADMIN — SODIUM CHLORIDE, POTASSIUM CHLORIDE, SODIUM LACTATE AND CALCIUM CHLORIDE 1000 ML: 600; 310; 30; 20 INJECTION, SOLUTION INTRAVENOUS at 23:20

## 2020-02-10 RX ADMIN — HYDROMORPHONE HYDROCHLORIDE 0.5 MG: 1 INJECTION, SOLUTION INTRAMUSCULAR; INTRAVENOUS; SUBCUTANEOUS at 23:20

## 2020-02-10 RX ADMIN — ONDANSETRON 4 MG: 2 INJECTION INTRAMUSCULAR; INTRAVENOUS at 23:20

## 2020-02-10 RX ADMIN — FAMOTIDINE 20 MG: 10 INJECTION INTRAVENOUS at 23:19

## 2020-02-11 ENCOUNTER — APPOINTMENT (OUTPATIENT)
Dept: RADIOLOGY | Facility: MEDICAL CENTER | Age: 30
End: 2020-02-11
Attending: EMERGENCY MEDICINE
Payer: COMMERCIAL

## 2020-02-11 ENCOUNTER — APPOINTMENT (OUTPATIENT)
Dept: RADIOLOGY | Facility: MEDICAL CENTER | Age: 30
End: 2020-02-11
Attending: INTERNAL MEDICINE
Payer: COMMERCIAL

## 2020-02-11 VITALS
SYSTOLIC BLOOD PRESSURE: 112 MMHG | RESPIRATION RATE: 20 BRPM | HEIGHT: 64 IN | WEIGHT: 126.1 LBS | OXYGEN SATURATION: 98 % | BODY MASS INDEX: 21.53 KG/M2 | TEMPERATURE: 98.4 F | DIASTOLIC BLOOD PRESSURE: 57 MMHG | HEART RATE: 97 BPM

## 2020-02-11 PROBLEM — J45.20 MILD INTERMITTENT ASTHMA WITHOUT COMPLICATION: Status: RESOLVED | Noted: 2018-03-15 | Resolved: 2020-02-11

## 2020-02-11 PROBLEM — D72.829 LEUKOCYTOSIS: Status: ACTIVE | Noted: 2020-02-11

## 2020-02-11 PROBLEM — N17.9 ACUTE KIDNEY INJURY (HCC): Status: RESOLVED | Noted: 2020-02-11 | Resolved: 2020-02-11

## 2020-02-11 PROBLEM — R10.9 AP (ABDOMINAL PAIN): Status: RESOLVED | Noted: 2020-02-11 | Resolved: 2020-02-11

## 2020-02-11 PROBLEM — R10.9 AP (ABDOMINAL PAIN): Status: ACTIVE | Noted: 2020-02-11

## 2020-02-11 PROBLEM — N17.9 ACUTE KIDNEY INJURY (HCC): Status: ACTIVE | Noted: 2020-02-11

## 2020-02-11 LAB
ALBUMIN SERPL BCP-MCNC: 3.6 G/DL (ref 3.2–4.9)
ALBUMIN/GLOB SERPL: 1.6 G/DL
ALP SERPL-CCNC: 77 U/L (ref 30–99)
ALT SERPL-CCNC: 9 U/L (ref 2–50)
ANION GAP SERPL CALC-SCNC: 11 MMOL/L (ref 0–11.9)
ANION GAP SERPL CALC-SCNC: 9 MMOL/L (ref 0–11.9)
AST SERPL-CCNC: 25 U/L (ref 12–45)
BILIRUB SERPL-MCNC: 0.7 MG/DL (ref 0.1–1.5)
BUN SERPL-MCNC: 17 MG/DL (ref 8–22)
BUN SERPL-MCNC: 21 MG/DL (ref 8–22)
CALCIUM SERPL-MCNC: 9.1 MG/DL (ref 8.4–10.2)
CALCIUM SERPL-MCNC: 9.1 MG/DL (ref 8.4–10.2)
CHLORIDE SERPL-SCNC: 102 MMOL/L (ref 96–112)
CHLORIDE SERPL-SCNC: 104 MMOL/L (ref 96–112)
CO2 SERPL-SCNC: 23 MMOL/L (ref 20–33)
CO2 SERPL-SCNC: 23 MMOL/L (ref 20–33)
CREAT SERPL-MCNC: 1.27 MG/DL (ref 0.5–1.4)
CREAT SERPL-MCNC: 1.41 MG/DL (ref 0.5–1.4)
ERYTHROCYTE [DISTWIDTH] IN BLOOD BY AUTOMATED COUNT: 40.8 FL (ref 35.9–50)
ERYTHROCYTE [DISTWIDTH] IN BLOOD BY AUTOMATED COUNT: 41.4 FL (ref 35.9–50)
GLOBULIN SER CALC-MCNC: 2.3 G/DL (ref 1.9–3.5)
GLUCOSE SERPL-MCNC: 118 MG/DL (ref 65–99)
GLUCOSE SERPL-MCNC: 95 MG/DL (ref 65–99)
HCT VFR BLD AUTO: 39.4 % (ref 37–47)
HCT VFR BLD AUTO: 40.6 % (ref 37–47)
HGB BLD-MCNC: 13.4 G/DL (ref 12–16)
HGB BLD-MCNC: 13.5 G/DL (ref 12–16)
MCH RBC QN AUTO: 31.3 PG (ref 27–33)
MCH RBC QN AUTO: 31.6 PG (ref 27–33)
MCHC RBC AUTO-ENTMCNC: 33.3 G/DL (ref 33.6–35)
MCHC RBC AUTO-ENTMCNC: 34 G/DL (ref 33.6–35)
MCV RBC AUTO: 92.9 FL (ref 81.4–97.8)
MCV RBC AUTO: 94 FL (ref 81.4–97.8)
PLATELET # BLD AUTO: 174 K/UL (ref 164–446)
PLATELET # BLD AUTO: 177 K/UL (ref 164–446)
PMV BLD AUTO: 11.7 FL (ref 9–12.9)
PMV BLD AUTO: 12 FL (ref 9–12.9)
POTASSIUM SERPL-SCNC: 4 MMOL/L (ref 3.6–5.5)
POTASSIUM SERPL-SCNC: 4.1 MMOL/L (ref 3.6–5.5)
PROT SERPL-MCNC: 5.9 G/DL (ref 6–8.2)
RBC # BLD AUTO: 4.24 M/UL (ref 4.2–5.4)
RBC # BLD AUTO: 4.32 M/UL (ref 4.2–5.4)
SODIUM SERPL-SCNC: 136 MMOL/L (ref 135–145)
SODIUM SERPL-SCNC: 136 MMOL/L (ref 135–145)
WBC # BLD AUTO: 13.4 K/UL (ref 4.8–10.8)
WBC # BLD AUTO: 15.3 K/UL (ref 4.8–10.8)

## 2020-02-11 PROCEDURE — 74177 CT ABD & PELVIS W/CONTRAST: CPT

## 2020-02-11 PROCEDURE — 700105 HCHG RX REV CODE 258: Performed by: EMERGENCY MEDICINE

## 2020-02-11 PROCEDURE — 96376 TX/PRO/DX INJ SAME DRUG ADON: CPT

## 2020-02-11 PROCEDURE — 76775 US EXAM ABDO BACK WALL LIM: CPT

## 2020-02-11 PROCEDURE — 700111 HCHG RX REV CODE 636 W/ 250 OVERRIDE (IP): Performed by: INTERNAL MEDICINE

## 2020-02-11 PROCEDURE — 700117 HCHG RX CONTRAST REV CODE 255: Performed by: EMERGENCY MEDICINE

## 2020-02-11 PROCEDURE — 80048 BASIC METABOLIC PNL TOTAL CA: CPT

## 2020-02-11 PROCEDURE — 700105 HCHG RX REV CODE 258: Performed by: INTERNAL MEDICINE

## 2020-02-11 PROCEDURE — 76856 US EXAM PELVIC COMPLETE: CPT

## 2020-02-11 PROCEDURE — G0378 HOSPITAL OBSERVATION PER HR: HCPCS

## 2020-02-11 PROCEDURE — 96375 TX/PRO/DX INJ NEW DRUG ADDON: CPT

## 2020-02-11 PROCEDURE — 99236 HOSP IP/OBS SAME DATE HI 85: CPT | Performed by: INTERNAL MEDICINE

## 2020-02-11 PROCEDURE — 700111 HCHG RX REV CODE 636 W/ 250 OVERRIDE (IP): Performed by: EMERGENCY MEDICINE

## 2020-02-11 PROCEDURE — 85027 COMPLETE CBC AUTOMATED: CPT

## 2020-02-11 PROCEDURE — 80053 COMPREHEN METABOLIC PANEL: CPT

## 2020-02-11 RX ORDER — ONDANSETRON 2 MG/ML
4 INJECTION INTRAMUSCULAR; INTRAVENOUS ONCE
Status: COMPLETED | OUTPATIENT
Start: 2020-02-11 | End: 2020-02-11

## 2020-02-11 RX ORDER — BISACODYL 10 MG
10 SUPPOSITORY, RECTAL RECTAL
Status: DISCONTINUED | OUTPATIENT
Start: 2020-02-11 | End: 2020-02-11 | Stop reason: HOSPADM

## 2020-02-11 RX ORDER — OXYCODONE HYDROCHLORIDE 10 MG/1
10 TABLET ORAL
Status: DISCONTINUED | OUTPATIENT
Start: 2020-02-11 | End: 2020-02-11 | Stop reason: HOSPADM

## 2020-02-11 RX ORDER — SODIUM CHLORIDE, SODIUM LACTATE, POTASSIUM CHLORIDE, CALCIUM CHLORIDE 600; 310; 30; 20 MG/100ML; MG/100ML; MG/100ML; MG/100ML
INJECTION, SOLUTION INTRAVENOUS CONTINUOUS
Status: DISCONTINUED | OUTPATIENT
Start: 2020-02-11 | End: 2020-02-11 | Stop reason: HOSPADM

## 2020-02-11 RX ORDER — LORAZEPAM 2 MG/ML
0.5 INJECTION INTRAMUSCULAR ONCE
Status: COMPLETED | OUTPATIENT
Start: 2020-02-11 | End: 2020-02-11

## 2020-02-11 RX ORDER — HYDROMORPHONE HYDROCHLORIDE 1 MG/ML
1 INJECTION, SOLUTION INTRAMUSCULAR; INTRAVENOUS; SUBCUTANEOUS ONCE
Status: COMPLETED | OUTPATIENT
Start: 2020-02-11 | End: 2020-02-11

## 2020-02-11 RX ORDER — POLYETHYLENE GLYCOL 3350 17 G/17G
1 POWDER, FOR SOLUTION ORAL
Status: DISCONTINUED | OUTPATIENT
Start: 2020-02-11 | End: 2020-02-11 | Stop reason: HOSPADM

## 2020-02-11 RX ORDER — LORAZEPAM 2 MG/ML
0.5 INJECTION INTRAMUSCULAR EVERY 4 HOURS PRN
Status: DISCONTINUED | OUTPATIENT
Start: 2020-02-11 | End: 2020-02-11 | Stop reason: HOSPADM

## 2020-02-11 RX ORDER — ENALAPRILAT 1.25 MG/ML
1.25 INJECTION INTRAVENOUS EVERY 6 HOURS PRN
Status: DISCONTINUED | OUTPATIENT
Start: 2020-02-11 | End: 2020-02-11 | Stop reason: HOSPADM

## 2020-02-11 RX ORDER — HYDROMORPHONE HYDROCHLORIDE 1 MG/ML
0.5 INJECTION, SOLUTION INTRAMUSCULAR; INTRAVENOUS; SUBCUTANEOUS
Status: DISCONTINUED | OUTPATIENT
Start: 2020-02-11 | End: 2020-02-11 | Stop reason: HOSPADM

## 2020-02-11 RX ORDER — AMOXICILLIN 250 MG
2 CAPSULE ORAL 2 TIMES DAILY
Status: DISCONTINUED | OUTPATIENT
Start: 2020-02-11 | End: 2020-02-11 | Stop reason: HOSPADM

## 2020-02-11 RX ORDER — ACETAMINOPHEN 325 MG/1
650 TABLET ORAL EVERY 6 HOURS PRN
Status: DISCONTINUED | OUTPATIENT
Start: 2020-02-11 | End: 2020-02-11 | Stop reason: HOSPADM

## 2020-02-11 RX ORDER — OXYCODONE HYDROCHLORIDE 5 MG/1
5 TABLET ORAL
Status: DISCONTINUED | OUTPATIENT
Start: 2020-02-11 | End: 2020-02-11 | Stop reason: HOSPADM

## 2020-02-11 RX ADMIN — LORAZEPAM 0.5 MG: 2 INJECTION INTRAMUSCULAR; INTRAVENOUS at 10:39

## 2020-02-11 RX ADMIN — SODIUM CHLORIDE, POTASSIUM CHLORIDE, SODIUM LACTATE AND CALCIUM CHLORIDE: 600; 310; 30; 20 INJECTION, SOLUTION INTRAVENOUS at 03:56

## 2020-02-11 RX ADMIN — SODIUM CHLORIDE, POTASSIUM CHLORIDE, SODIUM LACTATE AND CALCIUM CHLORIDE 1000 ML: 600; 310; 30; 20 INJECTION, SOLUTION INTRAVENOUS at 00:38

## 2020-02-11 RX ADMIN — SODIUM CHLORIDE, POTASSIUM CHLORIDE, SODIUM LACTATE AND CALCIUM CHLORIDE: 600; 310; 30; 20 INJECTION, SOLUTION INTRAVENOUS at 12:27

## 2020-02-11 RX ADMIN — IOHEXOL 100 ML: 350 INJECTION, SOLUTION INTRAVENOUS at 00:06

## 2020-02-11 RX ADMIN — LORAZEPAM 0.5 MG: 2 INJECTION INTRAMUSCULAR; INTRAVENOUS at 02:03

## 2020-02-11 RX ADMIN — HYDROMORPHONE HYDROCHLORIDE 0.5 MG: 1 INJECTION, SOLUTION INTRAMUSCULAR; INTRAVENOUS; SUBCUTANEOUS at 10:38

## 2020-02-11 RX ADMIN — HYDROMORPHONE HYDROCHLORIDE 1 MG: 1 INJECTION, SOLUTION INTRAMUSCULAR; INTRAVENOUS; SUBCUTANEOUS at 00:32

## 2020-02-11 RX ADMIN — ONDANSETRON 4 MG: 2 INJECTION INTRAMUSCULAR; INTRAVENOUS at 00:53

## 2020-02-11 ASSESSMENT — LIFESTYLE VARIABLES
HOW MANY TIMES IN THE PAST YEAR HAVE YOU HAD 5 OR MORE DRINKS IN A DAY: 0
EVER_SMOKED: NEVER
ON A TYPICAL DAY WHEN YOU DRINK ALCOHOL HOW MANY DRINKS DO YOU HAVE: 0
EVER FELT BAD OR GUILTY ABOUT YOUR DRINKING: NO
DOES PATIENT WANT TO STOP DRINKING: NO
ALCOHOL_USE: NO
HAVE YOU EVER FELT YOU SHOULD CUT DOWN ON YOUR DRINKING: NO
TOTAL SCORE: 0
HAVE PEOPLE ANNOYED YOU BY CRITICIZING YOUR DRINKING: NO
TOTAL SCORE: 0
EVER HAD A DRINK FIRST THING IN THE MORNING TO STEADY YOUR NERVES TO GET RID OF A HANGOVER: NO
AVERAGE NUMBER OF DAYS PER WEEK YOU HAVE A DRINK CONTAINING ALCOHOL: 0
CONSUMPTION TOTAL: NEGATIVE
TOTAL SCORE: 0

## 2020-02-11 ASSESSMENT — COGNITIVE AND FUNCTIONAL STATUS - GENERAL
MOBILITY SCORE: 24
DAILY ACTIVITIY SCORE: 24
SUGGESTED CMS G CODE MODIFIER MOBILITY: CH
SUGGESTED CMS G CODE MODIFIER DAILY ACTIVITY: CH

## 2020-02-11 ASSESSMENT — ENCOUNTER SYMPTOMS
SHORTNESS OF BREATH: 0
WEAKNESS: 0
LOSS OF CONSCIOUSNESS: 0
NAUSEA: 1
SPUTUM PRODUCTION: 0
PALPITATIONS: 0
TINGLING: 0
DEPRESSION: 0
CONSTIPATION: 0
VOMITING: 1
FLANK PAIN: 1
HEADACHES: 0
COUGH: 0
ABDOMINAL PAIN: 1
STRIDOR: 0
FEVER: 0
DIARRHEA: 0
DIZZINESS: 0
CHILLS: 1
MYALGIAS: 0
FALLS: 0

## 2020-02-11 ASSESSMENT — PATIENT HEALTH QUESTIONNAIRE - PHQ9
2. FEELING DOWN, DEPRESSED, IRRITABLE, OR HOPELESS: NOT AT ALL
SUM OF ALL RESPONSES TO PHQ9 QUESTIONS 1 AND 2: 0
1. LITTLE INTEREST OR PLEASURE IN DOING THINGS: NOT AT ALL

## 2020-02-11 NOTE — H&P
Hospital Medicine History & Physical Note    Date of Service  2/11/2020    Primary Care Physician  HANK Chen    Consultants  None    Code Status  Full    Chief Complaint  Flank and abdominal pain    History of Presenting Illness  29 y.o. female who presented 2/10/2020 with bilateral flank and right lower quadrant abdominal pain.  Patient states her symptoms started 2 PM with bilateral flank pain as well as nausea.  Pain persisted and she developed vomiting so she went to urgent care.  At that time they recommended she come to the ER however she decided to go home.  She states she tried to sleep but was unable to sleep due to pain so she came to the emergency department.  At some point she developed chills and right lower quadrant pain.  She does have a history of a ruptured ovarian cyst but states this is much different.  She also has a history of gastric ulcers but this is different as well.  She denies any hematuria.  She does occasionally get migraines, states she had a headache today but took medication and it resolved.  I did discuss the case including labs and imaging with the ER physician.    Review of Systems  Review of Systems   Constitutional: Positive for chills. Negative for fever and malaise/fatigue.   HENT: Negative for congestion.    Respiratory: Negative for cough, sputum production, shortness of breath and stridor.    Cardiovascular: Negative for chest pain, palpitations and leg swelling.   Gastrointestinal: Positive for abdominal pain, nausea and vomiting. Negative for constipation and diarrhea.   Genitourinary: Positive for flank pain. Negative for dysuria, hematuria and urgency.   Musculoskeletal: Negative for falls and myalgias.   Neurological: Negative for dizziness, tingling, loss of consciousness, weakness and headaches.   Psychiatric/Behavioral: Negative for depression and suicidal ideas.   All other systems reviewed and are negative.      Past Medical History   has a past  medical history of Asthma and Migraines. She also has no past medical history of CAD (coronary artery disease), Cancer (HCC), Chronic obstructive pulmonary disease (HCC), Congestive heart failure (HCC), Diabetes (HCC), Hypertension, Infectious disease, Liver disease, Psychiatric disorder, Renal disorder, Seizure disorder (HCC), or Stroke (HCC).    Surgical History  None    Family History  family history includes Hypertension in her mother; No Known Problems in her father.     Social History   reports that she has never smoked. She has never used smokeless tobacco. She reports current alcohol use. She reports that she does not use drugs.    Allergies  Allergies   Allergen Reactions   • Pcn [Penicillins] Hives   • Clindamycin      rash       Medications  Prior to Admission Medications   Prescriptions Last Dose Informant Patient Reported? Taking?   ASHWAGANDHA PO   Yes Yes   Sig: Take  by mouth. Pt just started this medication x 2 weeks ago.   Magnesium Gluconate (MAGNESIUM 27 PO)   Yes Yes   Sig: Take  by mouth.   VITAMIN D PO   Yes Yes   Sig: Take  by mouth.   vitamin E 100 UNIT capsule   Yes Yes   Sig: Take  by mouth every day.      Facility-Administered Medications: None       Physical Exam  Temp:  [36.7 °C (98.1 °F)] 36.7 °C (98.1 °F)  Pulse:  [75-85] 75  Resp:  [18] 18  BP: (126-143)/(68-91) 126/68  SpO2:  [97 %-100 %] 97 %    Physical Exam  Vitals signs and nursing note reviewed.   Constitutional:       General: She is not in acute distress.     Appearance: She is well-developed. She is not diaphoretic.   HENT:      Head: Normocephalic and atraumatic.      Right Ear: External ear normal.      Left Ear: External ear normal.      Nose: Nose normal. No congestion or rhinorrhea.      Mouth/Throat:      Mouth: Mucous membranes are dry.      Pharynx: No oropharyngeal exudate.   Eyes:      General: No scleral icterus.        Right eye: No discharge.         Left eye: No discharge.      Extraocular Movements:  Extraocular movements intact.   Neck:      Musculoskeletal: Normal range of motion and neck supple.      Trachea: No tracheal deviation.   Cardiovascular:      Rate and Rhythm: Normal rate and regular rhythm.      Heart sounds: No murmur. No friction rub. No gallop.    Pulmonary:      Effort: Pulmonary effort is normal. No respiratory distress.      Breath sounds: Normal breath sounds. No stridor. No wheezing or rales.   Chest:      Chest wall: No tenderness.   Abdominal:      General: Bowel sounds are normal. There is no distension.      Palpations: Abdomen is soft.      Tenderness: There is tenderness in the right lower quadrant. There is no right CVA tenderness or left CVA tenderness.   Musculoskeletal: Normal range of motion.         General: No tenderness.      Right lower leg: No edema.      Left lower leg: No edema.   Lymphadenopathy:      Cervical: No cervical adenopathy.   Skin:     General: Skin is warm and dry.      Coloration: Skin is not jaundiced.      Findings: No erythema or rash.   Neurological:      General: No focal deficit present.      Mental Status: She is alert and oriented to person, place, and time.      Cranial Nerves: No cranial nerve deficit.   Psychiatric:         Mood and Affect: Mood normal.         Behavior: Behavior normal.         Thought Content: Thought content normal.         Judgment: Judgment normal.         Laboratory:  Recent Labs     02/10/20  2307   WBC 15.9*   RBC 4.73   HEMOGLOBIN 14.8   HEMATOCRIT 43.5   MCV 92.0   MCH 31.3   MCHC 34.0   RDW 39.8   PLATELETCT 190   MPV 11.3     Recent Labs     02/10/20  2307   SODIUM 137   POTASSIUM 4.0   CHLORIDE 100   CO2 23   GLUCOSE 111*   BUN 24*   CREATININE 1.59*   CALCIUM 9.6     Recent Labs     02/10/20  2307   ALTSGPT 14   ASTSGOT 30   ALKPHOSPHAT 91   TBILIRUBIN 0.6   LIPASE 27   GLUCOSE 111*         No results for input(s): NTPROBNP in the last 72 hours.      No results for input(s): TROPONINT in the last 72  hours.    Urinalysis:    Recent Labs     02/10/20  2250   SPECGRAVITY <=1.005   GLUCOSEUR Negative   KETONES Negative   NITRITE Negative   LEUKESTERAS Negative        Imaging:  US-PELVIC COMPLETE (TRANSABDOMINAL/TRANSVAGINAL) (COMBO)   Final Result         1.  Right ovarian cyst without complex features, likely dominant follicle.   2.  Free fluid in the bilateral pelvis.   3.  Thickened endometrial stripe, likely proliferative changes given patient age.   4.  Retroflexed uterus      US-RENAL   Final Result         1.  Echogenic foci of the left kidney most compatible with nephrolithiasis, no obstructive changes are appreciated.      CT-ABDOMEN-PELVIS WITH   Final Result         1.  Enhancing right ovarian cyst, likely involuting cyst.   2.  Nonvisualization of the appendix, cannot definitively evaluate for and/or exclude appendicitis.      [id number]    (Results Pending)         Assessment/Plan:  I anticipate this patient is appropriate for observation status at this time.    * AP (abdominal pain)- (present on admission)  Assessment & Plan  -Initially was bilateral flank pain, now right lower quadrant pain  -Certainly concerning for appendicitis, appendix not visualized on CT scan  -Obtain MRI of the abdomen  -Symptomatic care with narcotics as needed  -I did personally review her CT abdomen/pelvis, noted no acute intra-abdominal abnormality    Leukocytosis- (present on admission)  Assessment & Plan  -Possibly reactive, patient did vomit  -Possibly due to inflammatory process however I do not see any sign of bacterial infection warranting antibiotics  -Repeat CBC in the later in the morning    Acute kidney injury (HCC)- (present on admission)  Assessment & Plan  -Likely due to dehydration, possibly inflammatory process  -Start IV fluids  -Repeat BMP in the morning    Mild intermittent asthma without complication- (present on admission)  Assessment & Plan  -No acute exacerbation    Migraine without aura and  without status migrainosus, not intractable- (present on admission)  Assessment & Plan  -Did have a headache earlier today but now resolved      VTE prophylaxis: SCDs

## 2020-02-11 NOTE — ASSESSMENT & PLAN NOTE
-Initially was bilateral flank pain, now right lower quadrant pain  -Certainly concerning for appendicitis, appendix not visualized on CT scan  -Obtain MRI of the abdomen  -Symptomatic care with narcotics as needed  -I did personally review her CT abdomen/pelvis, noted no acute intra-abdominal abnormality

## 2020-02-11 NOTE — DISCHARGE SUMMARY
Discharge Summary    CHIEF COMPLAINT ON ADMISSION  Chief Complaint   Patient presents with   • Flank Pain     bilat flank pain and nausea since 1400. denies urinary symptoms, did have chills       Reason for Admission  Flank Pain     Admission Date  2/10/2020    CODE STATUS  Full Code    HPI & HOSPITAL COURSE   is a very pleasant 29-year-old female who admitted for evaluation of abdominal pain.  Patient was having bilateral flank pain and right lower quadrant discomfort.  Symptoms started around 2 PM prior to admission, she also had nausea vomiting nonbilious nonbloody.  She says she tried to sleep however came to the emergency room because of uncontrolled pain.  She also developed some chills.  On admission she underwent a CT scan of her abdomen and pelvis which was essentially unremarkable, I reviewed this with radiology who said her appendix looked normal with no inflammatory changes noted.  She also had a pelvic ultrasound except for an ovarian cyst did not show any other abnormalities.  She had a negative pregnancy test, negative urinalysis.  She did have acute renal injury possibly due to dehydration, given this, IV fluids were provided and her repeat BMPs did show resolution of her CIARA.  Otherwise patient did not have any fevers chills or any tachycardia during her admission based on vitals.  She did have clinical improvement.  At this point we are uncertain as the etiology of her symptoms.  -Recheck CBC prior to her discharge which was also improved.    I did give her a laboratory slip to obtain a CBC in the next 2 days.  I know that is difficult for her to see a primary care physician, so I recommended her to follow-up with me as I am here all week and I will be more than happy to recheck her CBC.    Otherwise Patient denies fevers/chills, chest pain, shortness of breath or nausea/vommiting.         Therefore, she is discharged in good and stable condition to home with close outpatient  follow-up.      Discharge Date  2/11/2019    FOLLOW UP ITEMS POST DISCHARGE  PCP in 1 week    DISCHARGE DIAGNOSES  Principal Problem (Resolved):    AP (abdominal pain) POA: Yes  Active Problems:    Migraine without aura and without status migrainosus, not intractable POA: Yes    Leukocytosis POA: Yes  Resolved Problems:    Mild intermittent asthma without complication POA: Yes      Overview: Albuterol only for exercise induced          Acute kidney injury (HCC) POA: Yes      FOLLOW UP  No future appointments.  No follow-up provider specified.    MEDICATIONS ON DISCHARGE     Medication List      CONTINUE taking these medications      Instructions   asa/apap/caffeine 250-250-65 MG Tabs  Commonly known as:  EXCEDRIN   Take 2 Tabs by mouth every 6 hours as needed for Headache (migraine).  Dose:  2 Tab     ASHWAGANDHA PO   Take 1 Cap by mouth every day. Pt just started this medication x 2 weeks ago.  Dose:  1 Cap     MAGNESIUM PO   Take 1 Cap by mouth every day.  Dose:  1 Cap     multivitamin Tabs   Take 1 Tab by mouth every day.  Dose:  1 Tab     VITAMIN D3 PO   Take 1 Cap by mouth every day.  Dose:  1 Cap     VITAMIN E PO   Take 1 Cap by mouth every day.  Dose:  1 Cap            Allergies  Allergies   Allergen Reactions   • Pcn [Penicillins] Rash     Pt reports that she gets a full body rash   • Clindamycin Rash     Pt reports that she gets a full body rash       DIET  Orders Placed This Encounter   Procedures   • Diet Order Regular     Standing Status:   Standing     Number of Occurrences:   1     Order Specific Question:   Diet:     Answer:   Regular [1]       ACTIVITY  As tolerated.  Weight bearing as tolerated    CONSULTATIONS  None    PROCEDURES  None    LABORATORY  Lab Results   Component Value Date    SODIUM 136 02/11/2020    POTASSIUM 4.0 02/11/2020    CHLORIDE 104 02/11/2020    CO2 23 02/11/2020    GLUCOSE 95 02/11/2020    BUN 17 02/11/2020    CREATININE 1.27 02/11/2020        Lab Results   Component Value  Date    WBC 13.4 (H) 02/11/2020    HEMOGLOBIN 13.5 02/11/2020    HEMATOCRIT 40.6 02/11/2020    PLATELETCT 177 02/11/2020        Total time of the discharge process exceeds 35 minutes.    In addition to admission E/M code,   The total time spent face to face with this patient was about 35 mins of which 60% of time was spent on counseling, review of records including pertinent lab data and studies, as well as discussing diagnostic evaluation and work up, planned therapeutic interventions and future disposition of care. Where indicated, the assessment and plan reflect discussion of patient with consultants, other healthcare providers, family members, and additional research needed to obtain further information in formulating the plan of care of this patient.

## 2020-02-11 NOTE — ASSESSMENT & PLAN NOTE
-Possibly reactive, patient did vomit  -Possibly due to inflammatory process however I do not see any sign of bacterial infection warranting antibiotics  -Repeat CBC in the later in the morning

## 2020-02-11 NOTE — ED PROVIDER NOTES
ED Provider Note    CHIEF COMPLAINT  Chief Complaint   Patient presents with   • Flank Pain     bilat flank pain and nausea since 1400. denies urinary symptoms, did have chills       HPI    Primary care provider: HANK Chen  Means of arrival: POV  History obtained from: Patient and friend  History limited by: Nothing    Belgica Cesar is a 29 y.o. female who presents with abdominal pain and bilateral flank pain.  Associated symptoms include nausea.  Symptoms began insidiously but quickly worsened throughout the day.  She is also had some associated chills but no documented fevers.  No vomiting or diarrhea today.  She does not recall any prior episodes like this.  She is otherwise fairly healthy she has mild intermittent asthma and occasionally gets migraine headaches.  She takes multiple daily vitamins and started Ashwaganda supplements in the last several weeks.  She has had several ovarian cysts rupture in the past, but this pain feels different.  She has a generalized achy abdominal pain that is mostly sharp and in her right lower quadrant and nonradiating.  She also feels an achy pain to both of her flanks.  Her abdominal pain is worsened when she takes a deep breath.  No known sick contacts, however the patient does work as a pediatric and PICU nurse.    REVIEW OF SYSTEMS  Constitutional: Negative for fever but positive for chills.   HENT: Negative for rhinorrhea or sore throat.    Respiratory: Negative for cough or shortness of breath.    Cardiovascular: Negative for chest pain or palpitations.   Gastrointestinal: Positive for nausea and abdominal pain, negative for vomiting or diarrhea.  Genitourinary: Negative for dysuria or hematuria but positive for bilateral flank pain.   Musculoskeletal: Negative for midline back pain or joint pain.   Skin: Negative for itching or rash.   Neurological: Negative for sensory or motor changes.   Psychiatric/Behavioral: Positive for stress at work but  "negative for sad thoughts or suicidal ideation.  See HPI for further details. All other systems are negative.     PAST MEDICAL HISTORY   has a past medical history of Acute kidney injury (HCC) (2/11/2020), Asthma, and Migraines.    PAST FAMILY HISTORY  Family History   Problem Relation Age of Onset   • Hypertension Mother    • No Known Problems Father        SOCIAL HISTORY  Social History     Tobacco Use   • Smoking status: Never Smoker   • Smokeless tobacco: Never Used   Substance and Sexual Activity   • Alcohol use: Yes     Comment: occasional   • Drug use: No   • Sexual activity: Yes     Partners: Male     Birth control/protection: Pill       SURGICAL HISTORY  patient denies any surgical history    CURRENT MEDICATIONS  Home Medications     Reviewed by Anusha Busch R.N. (Registered Nurse) on 02/11/20 at 0405  Med List Status: Complete   Medication Last Dose Status   asa/apap/caffeine (EXCEDRIN) 250-250-65 MG Tab 2/10/2020 Active   ASHWAGANDHA PO 2/10/2020 Active   Magnesium Gluconate (MAGNESIUM 27 PO) 2/10/2020 Active   VITAMIN D PO 2/10/2020 Active   vitamin E 100 UNIT capsule 2/10/2020 Active                ALLERGIES  Allergies   Allergen Reactions   • Pcn [Penicillins] Hives   • Clindamycin      rash       PHYSICAL EXAM  VITAL SIGNS: /77   Pulse 65   Temp 36.8 °C (98.2 °F) (Oral)   Resp 16   Ht 1.626 m (5' 4\")   Wt 57.2 kg (126 lb 1.7 oz)   SpO2 96%   BMI 21.65 kg/m²    Pulse ox interpretation: On room air, I interpret this pulse ox as normal.  Constitutional: Well-developed, well-nourished. Sitting up, uncomfortable appearing.   HEENT: Normocephalic, atraumatic. Posterior pharynx clear, mucous membranes dry.  Eyes:  EOMI. Normal sclerae.  Neck: Supple, nontender.  Chest/Pulmonary: Clear to ausculation bilaterally, no wheezes or rhonchi.  Cardiovascular: Regular rate and rhythm, no murmur.   Abdomen: Soft, tenderness over McBurney's point; no rebound, guarding, or masses.  Back: No CVA or " midline tenderness.   Musculoskeletal: No deformity or edema.  Neuro: Clear speech, normal coordination, cranial nerves II-XII grossly intact, no focal asymmetry or sensory deficits.   Psych: Normal mood and affect.  Skin: No rashes, warm and dry.      DIAGNOSTIC STUDIES / PROCEDURES    LABS & EKG  Results for orders placed or performed during the hospital encounter of 02/10/20   CBC WITHOUT DIFFERENTIAL   Result Value Ref Range    WBC 15.9 (H) 4.8 - 10.8 K/uL    RBC 4.73 4.20 - 5.40 M/uL    Hemoglobin 14.8 12.0 - 16.0 g/dL    Hematocrit 43.5 37.0 - 47.0 %    MCV 92.0 81.4 - 97.8 fL    MCH 31.3 27.0 - 33.0 pg    MCHC 34.0 33.6 - 35.0 g/dL    RDW 39.8 35.9 - 50.0 fL    Platelet Count 190 164 - 446 K/uL    MPV 11.3 9.0 - 12.9 fL   COMP METABOLIC PANEL   Result Value Ref Range    Sodium 137 135 - 145 mmol/L    Potassium 4.0 3.6 - 5.5 mmol/L    Chloride 100 96 - 112 mmol/L    Co2 23 20 - 33 mmol/L    Anion Gap 14.0 (H) 0.0 - 11.9    Glucose 111 (H) 65 - 99 mg/dL    Bun 24 (H) 8 - 22 mg/dL    Creatinine 1.59 (H) 0.50 - 1.40 mg/dL    Calcium 9.6 8.4 - 10.2 mg/dL    AST(SGOT) 30 12 - 45 U/L    ALT(SGPT) 14 2 - 50 U/L    Alkaline Phosphatase 91 30 - 99 U/L    Total Bilirubin 0.6 0.1 - 1.5 mg/dL    Albumin 4.4 3.2 - 4.9 g/dL    Total Protein 6.8 6.0 - 8.2 g/dL    Globulin 2.4 1.9 - 3.5 g/dL    A-G Ratio 1.8 g/dL   LIPASE   Result Value Ref Range    Lipase 27 7 - 58 U/L   D-DIMER   Result Value Ref Range    D-Dimer Screen <0.40 0.00 - 0.50 ug/mL (FEU)   URINALYSIS CULTURE, IF INDICATED   Result Value Ref Range    Color Yellow     Character Clear     Specific Gravity <=1.005 <1.035    Ph 6.0 5.0 - 8.0    Glucose Negative Negative mg/dL    Ketones Negative Negative mg/dL    Protein Negative Negative mg/dL    Bilirubin Negative Negative    Nitrite Negative Negative    Leukocyte Esterase Negative Negative    Occult Blood Negative Negative    Micro Urine Req see below    HCG QUAL SERUM   Result Value Ref Range    Beta-Hcg  Qualitative Serum Negative Negative   ESTIMATED GFR   Result Value Ref Range    GFR If  46 (A) >60 mL/min/1.73 m 2    GFR If Non  38 (A) >60 mL/min/1.73 m 2   Basic Metabolic Panel   Result Value Ref Range    Sodium 136 135 - 145 mmol/L    Potassium 4.1 3.6 - 5.5 mmol/L    Chloride 102 96 - 112 mmol/L    Co2 23 20 - 33 mmol/L    Glucose 118 (H) 65 - 99 mg/dL    Bun 21 8 - 22 mg/dL    Creatinine 1.41 (H) 0.50 - 1.40 mg/dL    Calcium 9.1 8.4 - 10.2 mg/dL    Anion Gap 11.0 0.0 - 11.9   ESTIMATED GFR   Result Value Ref Range    GFR If  53 (A) >60 mL/min/1.73 m 2    GFR If Non  44 (A) >60 mL/min/1.73 m 2       RADIOLOGY  US-PELVIC COMPLETE (TRANSABDOMINAL/TRANSVAGINAL) (COMBO)   Final Result         1.  Right ovarian cyst without complex features, likely dominant follicle.   2.  Free fluid in the bilateral pelvis.   3.  Thickened endometrial stripe, likely proliferative changes given patient age.   4.  Retroflexed uterus      US-RENAL   Final Result         1.  Echogenic foci of the left kidney most compatible with nephrolithiasis, no obstructive changes are appreciated.      CT-ABDOMEN-PELVIS WITH   Final Result         1.  Enhancing right ovarian cyst, likely involuting cyst.   2.  Nonvisualization of the appendix, cannot definitively evaluate for and/or exclude appendicitis.      [id number]    (Results Pending)       COURSE & MEDICAL DECISION MAKING    This is a 29 y.o. female who presents with bilateral flank pain and nausea and chills and right lower quadrant abdominal pain.  History of ovarian cysts.    Differential Diagnosis includes but is not limited to:  Appendicitis, pyelonephritis, ureterolithiasis, ovarian disease, viral syndrome    ED Course:  The patient has tenderness over her right lower quadrant, plan to screen with labs and imaging.  I will keep her n.p.o. until surgical process is ruled out and treated with a crystalloid fluid bolus  and parenteral medications for symptom relief.    White count is 15.9, I am glad that I am proceeding with advanced imaging.  However, CT scan without clear etiology.  The appendix is not visualized.  She is quite thin.  She has an enhancing right ovarian cyst plan to screen with ultrasound.    LFTs and lipase are reassuring doubt acute hepatobiliary disease, d-dimer is negative doubt PE.  Electrolytes are reassuring.  However, the patient has a creatinine of 1.6.  Her last one on file is 0.8.  She has not had any volume loss recently no exertion, low BUN to creatinine ratio.  That is audible continue with a second liter crystalloid fluid bolus and recheck a chemistry.  We will also screen her kidneys with ultrasound.    Right ovarian cyst without complex features on ultrasound there is free fluid in the pelvis, nothing acute on renal ultrasound.    On recheck, the patient is still having some pain and nausea.  She is feeling slightly better this I feel she is having a positive response to parenteral rehydration, but given her new onset CIARA and tenderness over the right lower quadrant and leukocytosis I am worried she could have an occult or early appendicitis, I would like to admit her for observation and further rehydration to ensure her kidney function returns appropriately and consider screening her with an MRI of her abdomen to see if she has any evidence of appendicitis.  Patient understands and agrees with the plan of care.  She is hemodynamically stable for transfer to the hospital in guarded condition.  Dr. Porter from hospitalist medicine will kindly assume care of the patient and hospitalize her for further work-up and treatment.    Medications   senna-docusate (PERICOLACE or SENOKOT S) 8.6-50 MG per tablet 2 Tab (has no administration in time range)     And   polyethylene glycol/lytes (MIRALAX) PACKET 1 Packet (has no administration in time range)     And   magnesium hydroxide (MILK OF MAGNESIA)  suspension 30 mL (has no administration in time range)     And   bisacodyl (DULCOLAX) suppository 10 mg (has no administration in time range)   lactated ringers infusion ( Intravenous New Bag 2/11/20 0356)   acetaminophen (TYLENOL) tablet 650 mg (has no administration in time range)   enalaprilat (VASOTEC) injection 1.25 mg (has no administration in time range)   Pharmacy Consult Request ...Pain Management Review 1 Each (has no administration in time range)     And   oxyCODONE immediate-release (ROXICODONE) tablet 5 mg (has no administration in time range)     And   oxyCODONE immediate-release (ROXICODONE) tablet 10 mg (has no administration in time range)     And   HYDROmorphone pf (DILAUDID) injection 0.5 mg (has no administration in time range)   LORazepam (ATIVAN) injection 0.5 mg (has no administration in time range)   HYDROmorphone pf (DILAUDID) injection 0.5 mg (0.5 mg Intravenous Given 2/10/20 2320)   ondansetron (ZOFRAN) syringe/vial injection 4 mg (4 mg Intravenous Given 2/10/20 2320)   lactated ringers infusion (BOLUS) (0 mL Intravenous Stopped 2/11/20 0053)   famotidine (PEPCID) injection 20 mg (20 mg Intravenous Given 2/10/20 2319)   lactated ringers infusion (BOLUS) (0 mL Intravenous Stopped 2/11/20 0100)   iohexol (OMNIPAQUE) 350 mg/mL (100 mL Intravenous Given 2/11/20 0006)   HYDROmorphone pf (DILAUDID) injection 1 mg (1 mg Intravenous Given 2/11/20 0032)   ondansetron (ZOFRAN) syringe/vial injection 4 mg (4 mg Intravenous Given 2/11/20 0053)   LORazepam (ATIVAN) injection 0.5 mg (0.5 mg Intravenous Given 2/11/20 0203)       FINAL IMPRESSION  1. Acute abdominal pain    2. CIARA (acute kidney injury) (HCC)    3. Dehydration    4. Bilateral flank pain    5. Leukocytosis, unspecified type    6. Right ovarian cyst    7. Free fluid in pelvis          -Hospitalized for further work-up and treatment-       Results, exam findings, clinical impression, presumed diagnosis, treatment options, and plan for further  work-up and treatment in the hospital were discussed with the patient, and they verbalized understanding, agreed with, and appreciated the plan of care.    Pertinent Labs & Imaging studies reviewed and verified by myself, as well as nursing notes and the patient's past medical, family, and social histories (See chart for details).    Portions of this record were made with voice recognition software.  Despite my review, spelling/grammar/context errors may still remain. Interpretation of this chart should be taken in this context.    Electronically signed by Casa Jimenez M.D. on 2/11/2020 at 6:08 AM.

## 2020-02-11 NOTE — ED TRIAGE NOTES
Chief Complaint   Patient presents with   • Flank Pain     bilat flank pain and nausea since 1400. denies urinary symptoms, did have chills

## 2020-02-11 NOTE — CARE PLAN
Problem: Communication  Goal: The ability to communicate needs accurately and effectively will improve  Outcome: PROGRESSING AS EXPECTED     Problem: Safety  Goal: Will remain free from injury  Outcome: PROGRESSING AS EXPECTED    Ongoing care. No acute issues at this time. Patient is resting in bed. No acute issues over night. Will continue PRN pain and nausea meds as needed. Waiting on MRI.

## 2020-02-11 NOTE — PROGRESS NOTES
Report received from night shift RN. Assume care. Pt. AAOx4 pt is in  bed,  Assessment completed. VSS. Denies n/v/d and pain, she is ambulating with steady gait, Pt was update for the care for the day. White board updated, All question answered. Pt has call light and belongings within reach,  bed is in the lowest position. Pt has no other needs at this time.     1055- Per Pt she has  hx of claustrophobia - medicated for anxiety and pain prior MRI schedule 1130.  1150- MRI test moved to 1500. Pt aware.  1230- Pt ambulating about hallways and unit.   1330- Per Dr Lin MRI will be DC. MR Tech Shaheed updated with POC. Pt aware as well.

## 2020-02-11 NOTE — PROGRESS NOTES
Med rec updated and complete  Allergies reviewed  Interviewed pt with friend at bedside with permission from pt.  Pt reports no prescription medications.  Pt reports no antibiotics in the last 2 weeks

## 2020-02-12 NOTE — PROGRESS NOTES
Pt discharged home in good and stable conditions, VSS,  she voided and had a BM prior DC, all belongins taken, IV removed prior DC- tolerated well and tip intact. All DC paper work and eduction given.

## 2020-02-12 NOTE — DISCHARGE INSTRUCTIONS
Discharge Instructions    Discharged to home by car with friend. Discharged via walking, hospital escort: Yes.  Special equipment needed: Not Applicable    Be sure to schedule a follow-up appointment with your primary care doctor or any specialists as instructed.     Discharge Plan:   Diet Plan: Discussed  Activity Level: Discussed  Confirmed Follow up Appointment: Patient to Call and Schedule Appointment  Confirmed Symptoms Management: Discussed  Medication Reconciliation Updated: Yes  Influenza Vaccine Indication: Not indicated: Previously immunized this influenza season and > 8 years of age    I understand that a diet low in cholesterol, fat, and sodium is recommended for good health. Unless I have been given specific instructions below for another diet, I accept this instruction as my diet prescription.   Other diet: Regular    Special Instructions: None    · Is patient discharged on Warfarin / Coumadin?   No     Depression / Suicide Risk    As you are discharged from this [facility] facility, it is important to learn how to keep safe from harming yourself.    Recognize the warning signs:  · Abrupt changes in personality, positive or negative- including increase in energy   · Giving away possessions  · Change in eating patterns- significant weight changes-  positive or negative  · Change in sleeping patterns- unable to sleep or sleeping all the time   · Unwillingness or inability to communicate  · Depression  · Unusual sadness, discouragement and loneliness  · Talk of wanting to die  · Neglect of personal appearance   · Rebelliousness- reckless behavior  · Withdrawal from people/activities they love  · Confusion- inability to concentrate     If you or a loved one observes any of these behaviors or has concerns about self-harm, here's what you can do:  · Talk about it- your feelings and reasons for harming yourself  · Remove any means that you might use to hurt yourself (examples: pills, rope, extension cords,  firearm)  · Get professional help from the community (Mental Health, Substance Abuse, psychological counseling)  · Do not be alone:Call your Safe Contact- someone whom you trust who will be there for you.  · Call your local CRISIS HOTLINE 207-6546 or 629-964-9567  · Call your local Children's Mobile Crisis Response Team [place] (498) 970-4494 or www.ScaleDB  · Call the toll free National Suicide Prevention Hotlines   · National Suicide Prevention Lifeline 792-889-UVOG (4381)  · National Hope Line Network 800-SUICIDE (860-9835)

## 2020-06-01 ENCOUNTER — OFFICE VISIT (OUTPATIENT)
Dept: MEDICAL GROUP | Facility: PHYSICIAN GROUP | Age: 30
End: 2020-06-01
Payer: COMMERCIAL

## 2020-06-01 VITALS
OXYGEN SATURATION: 98 % | SYSTOLIC BLOOD PRESSURE: 122 MMHG | BODY MASS INDEX: 21.17 KG/M2 | RESPIRATION RATE: 16 BRPM | DIASTOLIC BLOOD PRESSURE: 86 MMHG | HEART RATE: 89 BPM | HEIGHT: 64 IN | WEIGHT: 124 LBS | TEMPERATURE: 98.9 F

## 2020-06-01 DIAGNOSIS — G44.1 OTHER VASCULAR HEADACHE: ICD-10-CM

## 2020-06-01 DIAGNOSIS — F41.9 ANXIETY: ICD-10-CM

## 2020-06-01 DIAGNOSIS — G43.009 MIGRAINE WITHOUT AURA AND WITHOUT STATUS MIGRAINOSUS, NOT INTRACTABLE: ICD-10-CM

## 2020-06-01 PROCEDURE — 99214 OFFICE O/P EST MOD 30 MIN: CPT | Performed by: NURSE PRACTITIONER

## 2020-06-01 RX ORDER — DESOGESTREL AND ETHINYL ESTRADIOL 0.15-0.03
1 KIT ORAL
Qty: 105 TAB | Refills: 3 | Status: SHIPPED | OUTPATIENT
Start: 2020-06-01 | End: 2020-06-29

## 2020-06-01 RX ORDER — CLONAZEPAM 0.5 MG/1
TABLET ORAL
Qty: 15 TAB | Refills: 0 | Status: SHIPPED | OUTPATIENT
Start: 2020-06-01 | End: 2020-07-01

## 2020-06-01 ASSESSMENT — FIBROSIS 4 INDEX: FIB4 SCORE: 1.37

## 2020-06-01 ASSESSMENT — PATIENT HEALTH QUESTIONNAIRE - PHQ9: CLINICAL INTERPRETATION OF PHQ2 SCORE: 0

## 2020-06-01 NOTE — ASSESSMENT & PLAN NOTE
"Reports some increased anxiety, recent break up.  Some chest pain.  Is working to get into a counselor.  Handout provided.  Would like to have something for \"rescue\" very rarely   "

## 2020-06-01 NOTE — PROGRESS NOTES
"Chief Complaint   Patient presents with   • Medication Management   • Migraine       Subjective:   Belgica Cesar is a 29 y.o. female here today for evaluation and management of:    Migraine without aura and without status migrainosus, not intractable  Reports that she has been better when on BCP's for her periods.  Taking magnesium.  Has been watching hydration.  Tylenol, asa, caffeine.  Reports increasing visual aura, feels it pulsing.   Headaches are lasting 2-3 days.  Reports nausea, no vomitting.   Does not tolerate sumatriptan, took topamax without being able to tolerate.      Anxiety  Reports some increased anxiety, recent break up.  Some chest pain.  Is working to get into a counselor.  Handout provided.  Would like to have something for \"rescue\" very rarely          Current medicines (including changes today)  Current Outpatient Medications   Medication Sig Dispense Refill   • desogestrel-ethinyl estradiol (APRI) 0.15-30 MG-MCG per tablet Take 1 Tab by mouth every bedtime. 105 Tab 3   • clonazePAM (KLONOPIN) 0.5 MG Tab Take 1/2 to 1 tab prn panic attack 15 Tab 0   • asa/apap/caffeine (EXCEDRIN) 250-250-65 MG Tab Take 2 Tabs by mouth every 6 hours as needed for Headache (migraine).     • MAGNESIUM PO Take 1 Cap by mouth every day.     • Cholecalciferol (VITAMIN D3 PO) Take 1 Cap by mouth every day.     • VITAMIN E PO Take 1 Cap by mouth every day.     • multivitamin (THERAGRAN) Tab Take 1 Tab by mouth every day.       No current facility-administered medications for this visit.      She  has a past medical history of Acute kidney injury (HCC) (2/11/2020), Asthma, and Migraines. She also has no past medical history of CAD (coronary artery disease), Cancer (HCC), Chronic obstructive pulmonary disease (HCC), Congestive heart failure (HCC), Diabetes (HCC), Hypertension, Infectious disease, Liver disease, Psychiatric disorder, Seizure disorder (HCC), or Stroke (HCC).    ROS as stated in hpi  No chest pain, " "no shortness of breath, no abdominal pain       Objective:     /86 (BP Location: Left arm, Patient Position: Sitting)   Pulse 89   Temp 37.2 °C (98.9 °F) (Temporal)   Resp 16   Ht 1.626 m (5' 4\")   Wt 56.2 kg (124 lb)   SpO2 98%  Body mass index is 21.28 kg/m². stable.   Physical Exam:  Constitutional: Alert, no distress.  Skin: Warm, dry, good turgor,no cyanosis, no rashes in visible areas.  Eye: Equal, round and reactive, conjunctiva clear, lids normal.  Ears: No tenderness, no discharge.  External canals are without any significant edema or erythema.  Tympanic membranes are without any inflammation, no effusion.  Gross auditory acuity is intact.  Nose: symmetrical without tenderness, no discharge.  Mouth/Throat: lips without lesion.  Oropharynx clear.  Throat without erythema, exudates or tonsillar enlargement.  Neck: Trachea midline, no masses, no obvious thyroid enlargement.. No cervical or supraclavicular lymphadenopathy. Range of motion within normal limits.  Neuro: Cranial nerves 2-12 grossly intact.  No sensory deficit.  Respiratory: Unlabored respiratory effort, lungs clear to auscultation, no wheezes, no ronchi.  Cardiovascular: Normal S1, S2, no murmur, no edema.  Abdomen: Soft, non-tender, no masses, no guarding,  no hepatosplenomegaly.  Psych: Alert and oriented x3, normal affect and mood and judgement.        Assessment and Plan:   The following treatment plan was discussed    1. Other vascular headache  Chronic, ongoing, worsening.  Would like to get back on contraceptive that seemed to decrease her premenstrual periods.  Discussed that bcp's can increase migraines for some women.  No previous blood clots, non smoker,  Will try getting back on the pill to see if this helps.  Patient to let me know in 2 months.  Discussed importance of stress reduction, hydration.  May consider beta blocker.  Would like to have MRI since she has had a change with increased aura's.  Monitor and follow.   - " MR-BRAIN-WITH & W/O; Future    2. Migraine without aura and without status migrainosus, not intractable  See #1    3. Anxiety  New problem to me.  Acute issue, most likely due to new job, recent relationship break up.  Will refer to counseling, handout provided.  Rescue clonzepam sent to pharmacy.  Monitor and follow.   - clonazePAM (KLONOPIN) 0.5 MG Tab; Take 1/2 to 1 tab prn panic attack  Dispense: 15 Tab; Refill: 0      Followup: Return in about 2 months (around 8/1/2020) for follow up headaches via my chart.         Educated in proper administration of medication(s) ordered today including safety, possible SE, risks, benefits, rationale and alternatives to therapy.     Please note that this dictation was created using voice recognition software. I have made every reasonable attempt to correct obvious errors, but I expect that there are errors of grammar and possibly content that I did not discover before finalizing the note.

## 2020-06-01 NOTE — ASSESSMENT & PLAN NOTE
Reports that she has been better when on BCP's for her periods.  Taking magnesium.  Has been watching hydration.  Tylenol, asa, caffeine.  Reports increasing visual aura, feels it pulsing.   Headaches are lasting 2-3 days.  Reports nausea, no vomitting.   Does not tolerate sumatriptan, took topamax without being able to tolerate.

## 2020-06-09 ENCOUNTER — HOSPITAL ENCOUNTER (OUTPATIENT)
Dept: RADIOLOGY | Facility: MEDICAL CENTER | Age: 30
End: 2020-06-09
Attending: NURSE PRACTITIONER
Payer: COMMERCIAL

## 2020-06-09 DIAGNOSIS — G44.1 OTHER VASCULAR HEADACHE: ICD-10-CM

## 2020-06-09 PROCEDURE — 700117 HCHG RX CONTRAST REV CODE 255: Performed by: NURSE PRACTITIONER

## 2020-06-09 PROCEDURE — A9576 INJ PROHANCE MULTIPACK: HCPCS | Performed by: NURSE PRACTITIONER

## 2020-06-09 PROCEDURE — 70553 MRI BRAIN STEM W/O & W/DYE: CPT

## 2020-06-09 RX ADMIN — GADOTERIDOL 10 ML: 279.3 INJECTION, SOLUTION INTRAVENOUS at 13:25

## 2020-06-28 ENCOUNTER — PATIENT MESSAGE (OUTPATIENT)
Dept: MEDICAL GROUP | Facility: PHYSICIAN GROUP | Age: 30
End: 2020-06-28

## 2020-06-29 RX ORDER — NORETHINDRONE ACETATE AND ETHINYL ESTRADIOL 1MG-20(21)
1 KIT ORAL DAILY
Qty: 28 TAB | Refills: 11 | Status: SHIPPED | OUTPATIENT
Start: 2020-06-29 | End: 2020-07-29

## 2020-07-01 ENCOUNTER — PATIENT MESSAGE (OUTPATIENT)
Dept: MEDICAL GROUP | Facility: PHYSICIAN GROUP | Age: 30
End: 2020-07-01

## 2020-07-02 ENCOUNTER — TELEMEDICINE (OUTPATIENT)
Dept: MEDICAL GROUP | Facility: PHYSICIAN GROUP | Age: 30
End: 2020-07-02
Payer: COMMERCIAL

## 2020-07-02 VITALS
RESPIRATION RATE: 18 BRPM | TEMPERATURE: 97.8 F | HEIGHT: 64 IN | WEIGHT: 125 LBS | BODY MASS INDEX: 21.34 KG/M2 | HEART RATE: 68 BPM

## 2020-07-02 DIAGNOSIS — F41.9 ANXIETY: ICD-10-CM

## 2020-07-02 PROCEDURE — 99214 OFFICE O/P EST MOD 30 MIN: CPT | Mod: 95,CR | Performed by: NURSE PRACTITIONER

## 2020-07-02 RX ORDER — BUPROPION HYDROCHLORIDE 150 MG/1
150 TABLET ORAL EVERY MORNING
Qty: 30 TAB | Refills: 1 | Status: SHIPPED | OUTPATIENT
Start: 2020-07-02 | End: 2020-07-29 | Stop reason: SDUPTHER

## 2020-07-02 ASSESSMENT — FIBROSIS 4 INDEX: FIB4 SCORE: 1.37

## 2020-07-02 NOTE — ASSESSMENT & PLAN NOTE
Reports that anxiety and depression have worsened over the last few months.  Seeing a counselor and felt that a antidepressant and is thinking of wellbutrin.  Feels flat and down more and more often.  No suicidal  Thoughts or plans reported.

## 2020-07-02 NOTE — PROGRESS NOTES
Telemedicine Visit: Established Patient     This encounter was conducted via Zoom .   Verbal consent was obtained. Patient's identity was verified.    Subjective:   CC: depression worsening and anxiety  Belgica Cesar is a 29 y.o. female presenting for evaluation and management of:    Anxiety  Reports that anxiety and depression have worsened over the last few months.  Seeing a counselor and felt that a antidepressant and is thinking of wellbutrin.  Feels flat and down more and more often.  No suicidal  Thoughts or plans reported.       ROS as stated in hpi  Denies any recent fevers or chills. No nausea or vomiting. No chest pains or shortness of breath.     Allergies   Allergen Reactions   • Pcn [Penicillins] Rash     Pt reports that she gets a full body rash   • Clindamycin Rash     Pt reports that she gets a full body rash       Current medicines (including changes today)  Current Outpatient Medications   Medication Sig Dispense Refill   • buPROPion (WELLBUTRIN XL) 150 MG XL tablet Take 1 Tab by mouth every morning. 30 Tab 1   • norethindrone-ethinyl estradiol (JUNEL FE 1/20) 1-20 MG-MCG per tablet Take 1 Tab by mouth every day. 28 Tab 11   • asa/apap/caffeine (EXCEDRIN) 250-250-65 MG Tab Take 2 Tabs by mouth every 6 hours as needed for Headache (migraine).     • MAGNESIUM PO Take 1 Cap by mouth every day.     • Cholecalciferol (VITAMIN D3 PO) Take 1 Cap by mouth every day.     • VITAMIN E PO Take 1 Cap by mouth every day.     • multivitamin (THERAGRAN) Tab Take 1 Tab by mouth every day.       No current facility-administered medications for this visit.        Patient Active Problem List    Diagnosis Date Noted   • Anxiety 06/01/2020   • Leukocytosis 02/11/2020   • Well adult exam 04/19/2018   • Migraine without aura and without status migrainosus, not intractable 03/15/2018   • History of stomach ulcers 03/15/2018       Family History   Problem Relation Age of Onset   • Hypertension Mother    • No Known  "Problems Father        She  has a past medical history of Acute kidney injury (HCC) (2/11/2020), Asthma, and Migraines. She also has no past medical history of CAD (coronary artery disease), Cancer (HCC), Chronic obstructive pulmonary disease (HCC), Congestive heart failure (HCC), Diabetes (HCC), Hypertension, Infectious disease, Liver disease, Psychiatric disorder, Seizure disorder (HCC), or Stroke (HCC).  She  has no past surgical history on file.       Objective:   Pulse 68   Temp 36.6 °C (97.8 °F)   Resp 18   Ht 1.626 m (5' 4\")   Wt 56.7 kg (125 lb)   LMP 06/29/2020 (Exact Date)   BMI 21.46 kg/m²     Physical Exam:  Constitutional: Alert, no distress, well-groomed.  Skin: No rashes in visible areas.  Eye: Round. Conjunctiva clear, lids normal. No icterus.   ENMT: Lips pink without lesions, good dentition, moist mucous membranes. Phonation normal.  Neck: No masses, no thyromegaly. Moves freely without pain.  CV: Pulse as reported by patient  Respiratory: Unlabored respiratory effort, no cough or audible wheeze  Psych: Alert and oriented x3, normal affect and mood.       Assessment and Plan:   The following treatment plan was discussed:     1. Anxiety  Worsening problem, most likely due to recent relationship break up and starting new job (RN on peds).  Will start wellbutrin  mg daily.  Return to counselor in 2 weeks,  Return to our office in 4 weeks to discuss symptoms and dosing.  Discussed RED FLAGs and warnings discussed.  Monitor and follow.     Other orders  - buPROPion (WELLBUTRIN XL) 150 MG XL tablet; Take 1 Tab by mouth every morning.  Dispense: 30 Tab; Refill: 1        Follow-up: Return in about 4 weeks (around 7/30/2020) for depression medication follow up.         "

## 2020-07-29 ENCOUNTER — TELEMEDICINE (OUTPATIENT)
Dept: MEDICAL GROUP | Facility: PHYSICIAN GROUP | Age: 30
End: 2020-07-29
Payer: COMMERCIAL

## 2020-07-29 VITALS — BODY MASS INDEX: 21.51 KG/M2 | RESPIRATION RATE: 14 BRPM | HEIGHT: 64 IN | WEIGHT: 126 LBS

## 2020-07-29 DIAGNOSIS — F41.9 ANXIETY: ICD-10-CM

## 2020-07-29 PROCEDURE — 99214 OFFICE O/P EST MOD 30 MIN: CPT | Mod: 95,CR | Performed by: NURSE PRACTITIONER

## 2020-07-29 RX ORDER — BUPROPION HYDROCHLORIDE 150 MG/1
150 TABLET ORAL EVERY MORNING
Qty: 90 TAB | Refills: 3 | Status: SHIPPED | OUTPATIENT
Start: 2020-07-29 | End: 2020-11-17

## 2020-07-29 ASSESSMENT — FIBROSIS 4 INDEX: FIB4 SCORE: 1.37

## 2020-07-29 NOTE — PROGRESS NOTES
"Telemedicine Visit: Established Patient     This encounter was conducted via Zoom .   Verbal consent was obtained. Patient's identity was verified.    Subjective:   CC: follow up medication  Belgcia Cesar is a 29 y.o. female presenting for evaluation and management of:    Anxiety  Reports that the wellbutrin has helped with feeling in a \"hole\".  No longer spiraling. Has started with behavioral therapy.  Working on dream analysis.  Psychiatrist, Dr. Celena Shah.  Requesting refill today.  Work is going well.       ROS as stated in hpi  Denies any recent fevers or chills. No nausea or vomiting. No chest pains or shortness of breath.     Allergies   Allergen Reactions   • Pcn [Penicillins] Rash     Pt reports that she gets a full body rash   • Clindamycin Rash     Pt reports that she gets a full body rash       Current medicines (including changes today)  Current Outpatient Medications   Medication Sig Dispense Refill   • buPROPion (WELLBUTRIN XL) 150 MG XL tablet Take 1 Tab by mouth every morning. 90 Tab 3   • asa/apap/caffeine (EXCEDRIN) 250-250-65 MG Tab Take 2 Tabs by mouth every 6 hours as needed for Headache (migraine).     • MAGNESIUM PO Take 1 Cap by mouth every day.     • Cholecalciferol (VITAMIN D3 PO) Take 1 Cap by mouth every day.     • VITAMIN E PO Take 1 Cap by mouth every day.     • multivitamin (THERAGRAN) Tab Take 1 Tab by mouth every day.       No current facility-administered medications for this visit.        Patient Active Problem List    Diagnosis Date Noted   • Anxiety 06/01/2020   • Leukocytosis 02/11/2020   • Well adult exam 04/19/2018   • Migraine without aura and without status migrainosus, not intractable 03/15/2018   • History of stomach ulcers 03/15/2018       Family History   Problem Relation Age of Onset   • Hypertension Mother    • No Known Problems Father        She  has a past medical history of Acute kidney injury (HCC) (2/11/2020), Asthma, and Migraines. She also has no past " "medical history of CAD (coronary artery disease), Cancer (HCC), Chronic obstructive pulmonary disease (HCC), Congestive heart failure (HCC), Diabetes (HCC), Hypertension, Infectious disease, Liver disease, Psychiatric disorder, Seizure disorder (HCC), or Stroke (HCC).  She  has no past surgical history on file.       Objective:   Resp 14 Comment: per patient  Ht 1.626 m (5' 4\") Comment: per patient  Wt 57.2 kg (126 lb) Comment: per patient  LMP 06/29/2020   BMI 21.63 kg/m²     Physical Exam:  Constitutional: Alert, no distress, well-groomed.  Skin: No rashes in visible areas.  Eye: Round. Conjunctiva clear, lids normal. No icterus.   ENMT: Lips pink without lesions, good dentition, moist mucous membranes. Phonation normal.  Neck: No masses, no thyromegaly. Moves freely without pain.  CV: Pulse as reported by patient  Respiratory: Unlabored respiratory effort, no cough or audible wheeze  Psych: Alert and oriented x3, normal affect and mood.       Assessment and Plan:   The following treatment plan was discussed:     1. Anxiety acute issue, responding well to wellbutrin and therapy.  Continue current dose.  Check back in via my chart in 3 months to determine if dosing is covering symptoms.  Continue with therapy.  Monitor and follow.     Other orders  - buPROPion (WELLBUTRIN XL) 150 MG XL tablet; Take 1 Tab by mouth every morning.  Dispense: 90 Tab; Refill: 3        Follow-up: Return in about 3 months (around 10/29/2020) for my chart update on depression/anxiety.           "

## 2020-07-29 NOTE — ASSESSMENT & PLAN NOTE
"Reports that the wellbutrin has helped with feeling in a \"hole\".  No longer spiraling. Has started with behavioral therapy.  Working on dream analysis.  Psychiatrist, Dr. Celena Shah.  Requesting refill today.  Work is going well.   "

## 2020-09-20 ENCOUNTER — PATIENT MESSAGE (OUTPATIENT)
Dept: MEDICAL GROUP | Facility: PHYSICIAN GROUP | Age: 30
End: 2020-09-20

## 2020-09-20 ENCOUNTER — PATIENT MESSAGE (OUTPATIENT)
Dept: PHYSICAL MEDICINE AND REHAB | Facility: MEDICAL CENTER | Age: 30
End: 2020-09-20

## 2020-09-20 DIAGNOSIS — M54.12 CERVICAL RADICULITIS: ICD-10-CM

## 2020-09-20 DIAGNOSIS — G43.009 MIGRAINE WITHOUT AURA AND WITHOUT STATUS MIGRAINOSUS, NOT INTRACTABLE: ICD-10-CM

## 2020-09-20 DIAGNOSIS — M54.12 CERVICAL RADICULOPATHY: ICD-10-CM

## 2020-09-20 DIAGNOSIS — G89.29 CHRONIC NECK PAIN: ICD-10-CM

## 2020-09-20 DIAGNOSIS — M50.20 CERVICAL DISC HERNIATION: ICD-10-CM

## 2020-09-20 DIAGNOSIS — M54.2 CHRONIC NECK PAIN: ICD-10-CM

## 2020-09-20 DIAGNOSIS — R20.0 LEFT ARM NUMBNESS: ICD-10-CM

## 2020-09-21 ENCOUNTER — IMMUNIZATION (OUTPATIENT)
Dept: OCCUPATIONAL MEDICINE | Facility: CLINIC | Age: 30
End: 2020-09-21

## 2020-09-21 DIAGNOSIS — Z23 NEED FOR VACCINATION: ICD-10-CM

## 2020-09-21 PROCEDURE — 90686 IIV4 VACC NO PRSV 0.5 ML IM: CPT | Performed by: PREVENTIVE MEDICINE

## 2020-11-17 ENCOUNTER — TELEMEDICINE (OUTPATIENT)
Dept: MEDICAL GROUP | Facility: PHYSICIAN GROUP | Age: 30
End: 2020-11-17
Payer: COMMERCIAL

## 2020-11-17 VITALS — HEART RATE: 68 BPM | BODY MASS INDEX: 21.34 KG/M2 | WEIGHT: 125 LBS | HEIGHT: 64 IN

## 2020-11-17 DIAGNOSIS — G43.009 MIGRAINE WITHOUT AURA AND WITHOUT STATUS MIGRAINOSUS, NOT INTRACTABLE: ICD-10-CM

## 2020-11-17 PROCEDURE — 99214 OFFICE O/P EST MOD 30 MIN: CPT | Mod: 95,CR | Performed by: NURSE PRACTITIONER

## 2020-11-17 RX ORDER — BUTALBITAL, ACETAMINOPHEN AND CAFFEINE 50; 325; 40 MG/1; MG/1; MG/1
1 TABLET ORAL EVERY 4 HOURS PRN
Qty: 30 TAB | Refills: 0 | Status: SHIPPED | OUTPATIENT
Start: 2020-11-17 | End: 2020-12-02

## 2020-11-17 ASSESSMENT — FIBROSIS 4 INDEX: FIB4 SCORE: 1.37

## 2020-11-17 NOTE — ASSESSMENT & PLAN NOTE
Reports more frequency of migraines and lasting longer.  BCP did not help.  She is no longer taking  Reports that she is in PICU now.  Feeling more stress.  Already seeing a therapist. Would like to see neuro.  Advised to keep strict log of headaches.  Continue self care.  Normal MRI done earlier this year.

## 2020-11-17 NOTE — PROGRESS NOTES
Virtual Visit: Established Patient   This visit was conducted via Zoom using secure and encrypted videoconferencing technology. The patient was in a private location in the state of [place].    The patient's identity was confirmed and verbal consent was obtained for this virtual visit.    Subjective:   CC:   Chief Complaint   Patient presents with   • Follow-Up   • Migraine       Belgica Cesar is a 29 y.o. female presenting for evaluation and management of:    Migraine without aura and without status migrainosus, not intractable  Reports more frequency of migraines and lasting longer.  BCP did not help.  She is no longer taking  Reports that she is in PICU now.  Feeling more stress.  Already seeing a therapist. Would like to see neuro.  Advised to keep strict log of headaches.  Continue self care.  Normal MRI done earlier this year.       ROS as stated in hpi  Denies any recent fevers or chills. No nausea or vomiting. No chest pains or shortness of breath.     Allergies   Allergen Reactions   • Pcn [Penicillins] Rash     Pt reports that she gets a full body rash   • Clindamycin Rash     Pt reports that she gets a full body rash       Current medicines (including changes today)  Current Outpatient Medications   Medication Sig Dispense Refill   • butalbital/apap/caffeine -40 mg (FIORICET) -40 MG Tab Take 1 Tab by mouth every four hours as needed for Headache or Migraine for up to 15 days. 30 Tab 0   • asa/apap/caffeine (EXCEDRIN) 250-250-65 MG Tab Take 2 Tabs by mouth every 6 hours as needed for Headache (migraine).     • MAGNESIUM PO Take 1 Cap by mouth every day.     • Cholecalciferol (VITAMIN D3 PO) Take 1 Cap by mouth every day.     • VITAMIN E PO Take 1 Cap by mouth every day.     • multivitamin (THERAGRAN) Tab Take 1 Tab by mouth every day.       No current facility-administered medications for this visit.        Patient Active Problem List    Diagnosis Date Noted   • Anxiety 06/01/2020   •  "Leukocytosis 02/11/2020   • Well adult exam 04/19/2018   • Migraine without aura and without status migrainosus, not intractable 03/15/2018   • History of stomach ulcers 03/15/2018       Family History   Problem Relation Age of Onset   • Hypertension Mother    • No Known Problems Father        She  has a past medical history of Acute kidney injury (HCC) (2/11/2020), Asthma, and Migraines. She also has no past medical history of CAD (coronary artery disease), Cancer (HCC), Chronic obstructive pulmonary disease (HCC), Congestive heart failure (HCC), Diabetes (HCC), Hypertension, Infectious disease, Liver disease, Psychiatric disorder, Seizure disorder (HCC), or Stroke (HCC).  She  has no past surgical history on file.       Objective:   Pulse 68   Ht 1.626 m (5' 4\")   Wt 56.7 kg (125 lb)   LMP 10/19/2020   BMI 21.46 kg/m²     Physical Exam:  Constitutional: Alert, no distress, well-groomed.  Skin: No rashes in visible areas.  Eye: Round. Conjunctiva clear, lids normal. No icterus.   ENMT: Lips pink without lesions, good dentition, moist mucous membranes. Phonation normal.  Neck: No masses, no thyromegaly. Moves freely without pain.  Respiratory: Unlabored respiratory effort, no cough or audible wheeze  Psych: Alert and oriented x3, normal affect and mood.       Assessment and Plan:   The following treatment plan was discussed:     1. Migraine without aura and without status migrainosus, not intractable  - REFERRAL TO NEUROLOGY  - butalbital/apap/caffeine -40 mg (FIORICET) -40 MG Tab; Take 1 Tab by mouth every four hours as needed for Headache or Migraine for up to 15 days.  Dispense: 30 Tab; Refill: 0        Follow-up: Return in about 2 weeks (around 12/1/2020) for my chart message regarding headaches.         "

## 2020-12-09 ENCOUNTER — PATIENT MESSAGE (OUTPATIENT)
Dept: MEDICAL GROUP | Facility: PHYSICIAN GROUP | Age: 30
End: 2020-12-09

## 2020-12-09 ENCOUNTER — HOSPITAL ENCOUNTER (OUTPATIENT)
Facility: MEDICAL CENTER | Age: 30
End: 2020-12-09
Attending: FAMILY MEDICINE
Payer: COMMERCIAL

## 2020-12-09 ENCOUNTER — OFFICE VISIT (OUTPATIENT)
Dept: URGENT CARE | Facility: CLINIC | Age: 30
End: 2020-12-09
Payer: COMMERCIAL

## 2020-12-09 VITALS
HEART RATE: 71 BPM | WEIGHT: 131 LBS | TEMPERATURE: 97.9 F | BODY MASS INDEX: 22.36 KG/M2 | RESPIRATION RATE: 12 BRPM | OXYGEN SATURATION: 100 % | HEIGHT: 64 IN | DIASTOLIC BLOOD PRESSURE: 64 MMHG | SYSTOLIC BLOOD PRESSURE: 122 MMHG

## 2020-12-09 DIAGNOSIS — J02.9 SORE THROAT: ICD-10-CM

## 2020-12-09 LAB
INT CON NEG: NORMAL
INT CON POS: NORMAL
S PYO AG THROAT QL: NEGATIVE

## 2020-12-09 PROCEDURE — 99213 OFFICE O/P EST LOW 20 MIN: CPT | Performed by: FAMILY MEDICINE

## 2020-12-09 PROCEDURE — 87077 CULTURE AEROBIC IDENTIFY: CPT

## 2020-12-09 PROCEDURE — 87880 STREP A ASSAY W/OPTIC: CPT | Performed by: FAMILY MEDICINE

## 2020-12-09 PROCEDURE — U0003 INFECTIOUS AGENT DETECTION BY NUCLEIC ACID (DNA OR RNA); SEVERE ACUTE RESPIRATORY SYNDROME CORONAVIRUS 2 (SARS-COV-2) (CORONAVIRUS DISEASE [COVID-19]), AMPLIFIED PROBE TECHNIQUE, MAKING USE OF HIGH THROUGHPUT TECHNOLOGIES AS DESCRIBED BY CMS-2020-01-R: HCPCS

## 2020-12-09 PROCEDURE — 87070 CULTURE OTHR SPECIMN AEROBIC: CPT

## 2020-12-09 ASSESSMENT — FIBROSIS 4 INDEX: FIB4 SCORE: 1.37

## 2020-12-10 DIAGNOSIS — J02.9 SORE THROAT: ICD-10-CM

## 2020-12-10 LAB
COVID ORDER STATUS COVID19: NORMAL
SARS-COV-2 RNA RESP QL NAA+PROBE: DETECTED
SPECIMEN SOURCE: ABNORMAL

## 2020-12-10 NOTE — PROGRESS NOTES
"Subjective:     CC:  presents with Pharyngitis            Pharyngitis   This is a new problem. The current episode started today. The problem has been unchanged. There has been no fever. The pain is mild. Associated symptoms includes: pain with swallowing . Pertinent negatives include no abdominal pain, congestion, coughing, diarrhea, headaches, shortness of breath or vomiting. 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    has tried acetaminophen for the symptoms. The treatment provided mild relief.     Social History     Tobacco Use   • Smoking status: Never Smoker   • Smokeless tobacco: Never Used   Substance Use Topics   • Alcohol use: Yes     Comment: occasional   • Drug use: No       Past Medical History:   Diagnosis Date   • Acute kidney injury (HCC) 2/11/2020   • Asthma    • Migraines            Review of Systems   Constitutional: Negative for fever, chills and malaise/fatigue.   Eyes: Negative for vision changes, d/c.    Respiratory: Negative for cough and sputum production.    Cardiovascular: Negative for chest pain and palpitations.   Gastrointestinal: Negative for nausea, vomiting, abdominal pain, diarrhea and constipation.   Genitourinary: Negative for dysuria, urgency and frequency.   Skin: Negative for rash or  itching.   Neurological: Negative for dizziness and tingling.   Psychiatric/Behavioral: Negative for depression.   Hematologic/lymphatic - denies bruising or excessive bleeding  All other systems reviewed and are negative.         Objective:   /64   Pulse 71   Temp 36.6 °C (97.9 °F) (Temporal)   Resp 12   Ht 1.626 m (5' 4\")   Wt 59.4 kg (131 lb)   SpO2 100%         Physical Exam   Constitutional:   oriented to person, place, and time.  appears well-developed and well-nourished. No distress.   HENT:   Head: Normocephalic and atraumatic.   Right Ear: External ear normal.   Left Ear: External ear normal.   Nose: Mucosal edema present. Right sinus " exhibits no maxillary sinus tenderness and no frontal sinus tenderness. Left sinus exhibits no maxillary sinus tenderness and no frontal sinus tenderness.   Mouth/Throat: no posterior oropharyngeal exudate.   There is posterior oropharyngeal erythema present. No posterior oropharyngeal edema.   Tonsils 1+ bilaterally     Eyes: Conjunctivae and EOM are normal. Pupils are equal, round, and reactive to light. Right eye exhibits no discharge. Left eye exhibits no discharge. No scleral icterus.   Neck: Normal range of motion. Neck supple. No JVD present. No tracheal deviation present. No thyromegaly present.   Cardiovascular: Normal rate, regular rhythm, normal heart sounds and intact distal pulses.  Exam reveals no friction rub.    No murmur heard.  Pulmonary/Chest: Effort normal and breath sounds normal. No respiratory distress.   no wheezes.   no rales.    Musculoskeletal:  exhibits no edema.   Lymphadenopathy:   No cervical LAD  Neurological:   alert and oriented to person, place, and time.   Skin: Skin is warm and dry. No erythema.   Psychiatric:   normal mood and affect.   Nursing note and vitals reviewed.             Assessment/Plan:     1. Sore throat  Likely viral   rx motrin 800mg tid prn   rapid strep negative.   COVID screen sent  Home isolation for now  Will call with results.     RTC if sx worsen     rx motrin 800mg tid prn       - CULTURE THROAT; Future  - COVID/SARS COV-2 PCR; Future

## 2020-12-12 DIAGNOSIS — Z88.0 PENICILLIN ALLERGY: ICD-10-CM

## 2020-12-12 DIAGNOSIS — J02.0 STREP THROAT: ICD-10-CM

## 2020-12-12 LAB
BACTERIA SPEC RESP CULT: ABNORMAL
BACTERIA SPEC RESP CULT: ABNORMAL
SIGNIFICANT IND 70042: ABNORMAL
SITE SITE: ABNORMAL
SOURCE SOURCE: ABNORMAL

## 2020-12-12 RX ORDER — CEFDINIR 300 MG/1
300 CAPSULE ORAL 2 TIMES DAILY
Qty: 10 CAP | Refills: 0 | Status: SHIPPED | OUTPATIENT
Start: 2020-12-12 | End: 2020-12-17

## 2020-12-12 RX ORDER — EPINEPHRINE 0.3 MG/.3ML
0.3 INJECTION SUBCUTANEOUS ONCE
Qty: 1 EACH | Refills: 0 | Status: SHIPPED | OUTPATIENT
Start: 2020-12-12 | End: 2020-12-12

## 2020-12-12 NOTE — PROGRESS NOTES
"Throat culture positive for Group C strep;      Plan:      She has allergy to PCN, clindamycin    She continues to have sore throat, so will prescribe omnicef.    She states that she has had \"alternative\" for PCN in past and did fine, but she can't remember which antibiotic it was, exactly.           Advised to discontinue the medication immediately if she has any adverse reaction      Epi pen also prescribed in the event of any allergic reaction    "

## 2020-12-16 DIAGNOSIS — Z23 NEED FOR VACCINATION: ICD-10-CM

## 2020-12-27 ENCOUNTER — PATIENT MESSAGE (OUTPATIENT)
Dept: MEDICAL GROUP | Facility: PHYSICIAN GROUP | Age: 30
End: 2020-12-27

## 2020-12-28 ENCOUNTER — PATIENT MESSAGE (OUTPATIENT)
Dept: MEDICAL GROUP | Facility: PHYSICIAN GROUP | Age: 30
End: 2020-12-28

## 2020-12-28 ENCOUNTER — HOSPITAL ENCOUNTER (OUTPATIENT)
Dept: LAB | Facility: MEDICAL CENTER | Age: 30
End: 2020-12-28
Attending: NURSE PRACTITIONER
Payer: COMMERCIAL

## 2020-12-28 DIAGNOSIS — U07.1 COVID-19 VIRUS INFECTION: ICD-10-CM

## 2020-12-28 LAB
ALBUMIN SERPL BCP-MCNC: 4.6 G/DL (ref 3.2–4.9)
ALBUMIN/GLOB SERPL: 1.8 G/DL
ALP SERPL-CCNC: 81 U/L (ref 30–99)
ALT SERPL-CCNC: 19 U/L (ref 2–50)
ANION GAP SERPL CALC-SCNC: 7 MMOL/L (ref 7–16)
AST SERPL-CCNC: 22 U/L (ref 12–45)
BILIRUB SERPL-MCNC: 0.4 MG/DL (ref 0.1–1.5)
BUN SERPL-MCNC: 14 MG/DL (ref 8–22)
CALCIUM SERPL-MCNC: 9.6 MG/DL (ref 8.5–10.5)
CHLORIDE SERPL-SCNC: 102 MMOL/L (ref 96–112)
CO2 SERPL-SCNC: 29 MMOL/L (ref 20–33)
CREAT SERPL-MCNC: 0.79 MG/DL (ref 0.5–1.4)
CRP SERPL HS-MCNC: 0.2 MG/L (ref 0–7.5)
GLOBULIN SER CALC-MCNC: 2.5 G/DL (ref 1.9–3.5)
GLUCOSE SERPL-MCNC: 121 MG/DL (ref 65–99)
NT-PROBNP SERPL IA-MCNC: 50 PG/ML (ref 0–125)
POTASSIUM SERPL-SCNC: 3.5 MMOL/L (ref 3.6–5.5)
PROT SERPL-MCNC: 7.1 G/DL (ref 6–8.2)
SARS-COV-2 AB SERPL QL IA: NORMAL
SODIUM SERPL-SCNC: 138 MMOL/L (ref 135–145)

## 2020-12-28 PROCEDURE — 86141 C-REACTIVE PROTEIN HS: CPT

## 2020-12-28 PROCEDURE — 80053 COMPREHEN METABOLIC PANEL: CPT

## 2020-12-28 PROCEDURE — 83880 ASSAY OF NATRIURETIC PEPTIDE: CPT

## 2020-12-28 PROCEDURE — 36415 COLL VENOUS BLD VENIPUNCTURE: CPT

## 2020-12-28 PROCEDURE — 86769 SARS-COV-2 COVID-19 ANTIBODY: CPT

## 2020-12-28 NOTE — TELEPHONE ENCOUNTER
From: Belgica Cesar  To: HANK Chen  Sent: 12/28/2020 8:03 AM PST  Subject: Non-Urgent Medical Question    I was hoping to get a CMP, BNP and CRP because those have been elevated in our patients coming in for an extended period following Covid and I have seen and read that there are continued cardiac inflammatory and failure complications. I would also like to get the Covid antibody test of possible as well.       ----- Message -----   From:HANK Chen   Sent:12/28/2020 7:56 AM PST   To:Belgica Cesar   Subject:RE: Non-Urgent Medical Question    Belgica  What specific tests are you looking for?  HANK Chen      ----- Message -----   From:Belgica Cesar   Sent:12/27/2020 11:00 AM PST   To:HANK Chen   Subject:Non-Urgent Medical Question    Good morning!   I was covid + this month and am now off quarantine and back at work. I was hoping to get some blood work done due to the incidence of cardiac complications post-infection. I did experience some chest pain and shortness of breath during my illness that has not completely resolved. I am also aware I struggle with anxiety and those symptoms could very well be associated with this process but I think it would be wise to check anyway. Please let me know your thoughts and I hope your holidays were enjoyable.    Thank you!

## 2020-12-31 ENCOUNTER — IMMUNIZATION (OUTPATIENT)
Dept: FAMILY PLANNING/WOMEN'S HEALTH CLINIC | Facility: IMMUNIZATION CENTER | Age: 30
End: 2020-12-31
Attending: FAMILY MEDICINE
Payer: COMMERCIAL

## 2020-12-31 DIAGNOSIS — Z23 NEED FOR VACCINATION: ICD-10-CM

## 2020-12-31 DIAGNOSIS — Z23 ENCOUNTER FOR VACCINATION: Primary | ICD-10-CM

## 2020-12-31 PROCEDURE — 91301 MODERNA SARS-COV-2 VACCINE: CPT

## 2020-12-31 PROCEDURE — 0011A MODERNA SARS-COV-2 VACCINE: CPT

## 2021-01-21 ENCOUNTER — OFFICE VISIT (OUTPATIENT)
Dept: NEUROLOGY | Facility: MEDICAL CENTER | Age: 31
End: 2021-01-21
Attending: NURSE PRACTITIONER
Payer: COMMERCIAL

## 2021-01-21 VITALS
OXYGEN SATURATION: 98 % | BODY MASS INDEX: 22.02 KG/M2 | WEIGHT: 128.97 LBS | DIASTOLIC BLOOD PRESSURE: 62 MMHG | RESPIRATION RATE: 15 BRPM | HEIGHT: 64 IN | HEART RATE: 72 BPM | SYSTOLIC BLOOD PRESSURE: 106 MMHG | TEMPERATURE: 98.2 F

## 2021-01-21 DIAGNOSIS — G43.001 MIGRAINE WITHOUT AURA AND WITH STATUS MIGRAINOSUS, NOT INTRACTABLE: ICD-10-CM

## 2021-01-21 PROCEDURE — 99203 OFFICE O/P NEW LOW 30 MIN: CPT | Performed by: NURSE PRACTITIONER

## 2021-01-21 RX ORDER — RIZATRIPTAN BENZOATE 10 MG/1
10 TABLET, ORALLY DISINTEGRATING ORAL
Qty: 9 TAB | Refills: 11 | Status: SHIPPED | OUTPATIENT
Start: 2021-01-21 | End: 2021-06-29 | Stop reason: SDUPTHER

## 2021-01-21 RX ORDER — BUTALBITAL, ACETAMINOPHEN AND CAFFEINE 50; 325; 40 MG/1; MG/1; MG/1
1 TABLET ORAL EVERY 4 HOURS PRN
COMMUNITY
End: 2022-03-05

## 2021-01-21 RX ORDER — EPINEPHRINE 0.3 MG/.3ML
0.3 INJECTION SUBCUTANEOUS ONCE
COMMUNITY
Start: 2020-12-12

## 2021-01-21 ASSESSMENT — ENCOUNTER SYMPTOMS
FEVER: 0
DIZZINESS: 0
NERVOUS/ANXIOUS: 0
NECK PAIN: 1
CHILLS: 0
WEAKNESS: 0
HEARTBURN: 0
COUGH: 0
HEADACHES: 1
SENSORY CHANGE: 0
DEPRESSION: 0
TINGLING: 0
FOCAL WEAKNESS: 0
BRUISES/BLEEDS EASILY: 1
BLURRED VISION: 1

## 2021-01-21 ASSESSMENT — FIBROSIS 4 INDEX: FIB4 SCORE: 0.86

## 2021-01-21 ASSESSMENT — PATIENT HEALTH QUESTIONNAIRE - PHQ9: CLINICAL INTERPRETATION OF PHQ2 SCORE: 0

## 2021-01-22 ENCOUNTER — NURSE TRIAGE (OUTPATIENT)
Dept: HEALTH INFORMATION MANAGEMENT | Facility: OTHER | Age: 31
End: 2021-01-22

## 2021-01-22 NOTE — TELEPHONE ENCOUNTER
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  No bruising noted.    Instructed to go to to ED     Reason for Disposition  • MODERATE rectal bleeding (small blood clots, passing blood without stool, or toilet water turns red) more than once a day    Additional Information  • Negative: Passed out (i.e., fainted, collapsed and was not responding)  • Negative: Shock suspected (e.g., cold/pale/clammy skin, too weak to stand, low BP, rapid pulse)  • Negative: Vomiting red blood or black (coffee ground) material  • Negative: Sounds like a life-threatening emergency to the triager  • Negative: Diarrhea is the main symptom  • Negative: Rectal symptoms  • Negative: SEVERE rectal bleeding (large blood clots; on and off, or constant bleeding)  • Negative: SEVERE dizziness (e.g., unable to stand, requires support to walk, feels like passing out now)    Answer Assessment - Initial Assessment Questions  1. APPEARANCE of BLOOD: \"What color is it?\" \"Is it passed separately, on the surface of the stool, or mixed in with the stool?\"       Bright red blood  2. AMOUNT: \"How much blood was passed?\"       Stools are dark with blood  3. FREQUENCY: \"How many times has blood been passed with the stools?\"       2 times today.    4. ONSET: \"When was the blood first seen in the stools?\" (Days or weeks)       today  5. DIARRHEA: \"Is there also some diarrhea?\" If so, ask: \"How many diarrhea stools were passed in past 24 hours?\"       no  6. CONSTIPATION: \"Do you have constipation?\" If so, \"How bad is it?\"      no  7. RECURRENT SYMPTOMS: \"Have you had blood in your stools before?\" If so, ask: \"When was the last time?\" and \"What happened that time?\"       no  8. BLOOD THINNERS: \"Do you take any blood thinners?\" (e.g., Coumadin/warfarin, Pradaxa/dabigatran, aspirin)      no  9. OTHER SYMPTOMS: \"Do you have any other symptoms?\"  (e.g., abdominal pain, vomiting, " "dizziness, fever)    Feels a little bit nauseas and tired  10. PREGNANCY: \"Is there any chance you are pregnant?\" \"When was your last menstrual period?\"        No    Protocols used: RECTAL BLEEDING-A-[place]      "

## 2021-01-22 NOTE — PATIENT INSTRUCTIONS
Maxalt 10mg 1 tablet under tongue up to once per day as needed, no more than 9 per month.         Start magnesium oxide 400mg by mouth every night, may take extra dose if needed for headache (over the counter), hold for diarrhea         Start Riboflavin (Vitamin B2) 400mg by mouth every night (over the counter),may turn urine bright yellow         Start COQ 10, take 300mg every night. (over the counter)          Attempt to go to bed and get up at the same time every night           Eat meals on regular basis            Stay hydrated.             Aerobic exercise 30 minutes daily             Avoid aged or smoked foods, avoid processed foods, red wine, aged cheese              Keep headache diary, include foods that you may have eaten.             Avoid overusing over the counter medications:  Do not take more than 500mg acetaminophen (tylenol), more than 4 times weekly, more frequent or larger doses are associated with medication overuse headache.          Migraine Headache  A migraine headache is a very strong throbbing pain on one side or both sides of your head. This type of headache can also cause other symptoms. It can last from 4 hours to 3 days. Talk with your doctor about what things may bring on (trigger) this condition.  What are the causes?  The exact cause of this condition is not known. This condition may be triggered or caused by:  · Drinking alcohol.  · Smoking.  · Taking medicines, such as:  ? Medicine used to treat chest pain (nitroglycerin).  ? Birth control pills.  ? Estrogen.  ? Some blood pressure medicines.  · Eating or drinking certain products.  · Doing physical activity.  Other things that may trigger a migraine headache include:  · Having a menstrual period.  · Pregnancy.  · Hunger.  · Stress.  · Not getting enough sleep or getting too much sleep.  · Weather changes.  · Tiredness (fatigue).  What increases the risk?  · Being 25-55 years old.  · Being female.  · Having a family history of  migraine headaches.  · Being .  · Having depression or anxiety.  · Being very overweight.  What are the signs or symptoms?  · A throbbing pain. This pain may:  ? Happen in any area of the head, such as on one side or both sides.  ? Make it hard to do daily activities.  ? Get worse with physical activity.  ? Get worse around bright lights or loud noises.  · Other symptoms may include:  ? Feeling sick to your stomach (nauseous).  ? Vomiting.  ? Dizziness.  ? Being sensitive to bright lights, loud noises, or smells.  · Before you get a migraine headache, you may get warning signs (an aura). An aura may include:  ? Seeing flashing lights or having blind spots.  ? Seeing bright spots, halos, or zigzag lines.  ? Having tunnel vision or blurred vision.  ? Having numbness or a tingling feeling.  ? Having trouble talking.  ? Having weak muscles.  · Some people have symptoms after a migraine headache (postdromal phase), such as:  ? Tiredness.  ? Trouble thinking (concentrating).  How is this treated?  · Taking medicines that:  ? Relieve pain.  ? Relieve the feeling of being sick to your stomach.  ? Prevent migraine headaches.  · Treatment may also include:  ? Having acupuncture.  ? Avoiding foods that bring on migraine headaches.  ? Learning ways to control your body functions (biofeedback).  ? Therapy to help you know and deal with negative thoughts (cognitive behavioral therapy).  Follow these instructions at home:  Medicines  · Take over-the-counter and prescription medicines only as told by your doctor.  · Ask your doctor if the medicine prescribed to you:  ? Requires you to avoid driving or using heavy machinery.  ? Can cause trouble pooping (constipation). You may need to take these steps to prevent or treat trouble pooping:  § Drink enough fluid to keep your pee (urine) pale yellow.  § Take over-the-counter or prescription medicines.  § Eat foods that are high in fiber. These include beans, whole grains, and  fresh fruits and vegetables.  § Limit foods that are high in fat and sugar. These include fried or sweet foods.  Lifestyle  · Do not drink alcohol.  · Do not use any products that contain nicotine or tobacco, such as cigarettes, e-cigarettes, and chewing tobacco. If you need help quitting, ask your doctor.  · Get at least 8 hours of sleep every night.  · Limit and deal with stress.  General instructions         · Keep a journal to find out what may bring on your migraine headaches. For example, write down:  ? What you eat and drink.  ? How much sleep you get.  ? Any change in what you eat or drink.  ? Any change in your medicines.  · If you have a migraine headache:  ? Avoid things that make your symptoms worse, such as bright lights.  ? It may help to lie down in a dark, quiet room.  ? Do not drive or use heavy machinery.  ? Ask your doctor what activities are safe for you.  · Keep all follow-up visits as told by your doctor. This is important.  Contact a doctor if:  · You get a migraine headache that is different or worse than others you have had.  · You have more than 15 headache days in one month.  Get help right away if:  · Your migraine headache gets very bad.  · Your migraine headache lasts longer than 72 hours.  · You have a fever.  · You have a stiff neck.  · You have trouble seeing.  · Your muscles feel weak or like you cannot control them.  · You start to lose your balance a lot.  · You start to have trouble walking.  · You pass out (faint).  · You have a seizure.  Summary  · A migraine headache is a very strong throbbing pain on one side or both sides of your head. These headaches can also cause other symptoms.  · This condition may be treated with medicines and changes to your lifestyle.  · Keep a journal to find out what may bring on your migraine headaches.  · Contact a doctor if you get a migraine headache that is different or worse than others you have had.  · Contact your doctor if you have more  than 15 headache days in a month.  This information is not intended to replace advice given to you by your health care provider. Make sure you discuss any questions you have with your health care provider.  Document Released: 09/26/2009 Document Revised: 04/10/2020 Document Reviewed: 01/30/2020  Elsevier Patient Education © 2020 [employer]

## 2021-01-22 NOTE — PROGRESS NOTES
Subjective:    HPI  Belgica Cesar is a 30 y.o. female who presents with chronic migraines.    She was referred by PCP, JULIETH Chen.    She saw a neurologist when she was 19.   She has a family history of severe depression and wants to avoid anti-depressants.          Age at Onset:  19  Triggers:  Menses, dehydration, hunger, sometimes wakes up with them.   Alleviating factors:  Fioricet, cold compress,  Meds tried and result:  Topamax made her foggy, sumatriptan didn't work, Excedrin works but she has acid reflux, Fioricet helps pretty well, she got got prescribed.   Wellbutrin didn't  Help.   Hormones:  None, she is sexually active, uses condoms.   Caffeine use:150-200mg per day when working.   OTC medications--frequency: Excedrin 5-9 tablets per week.   How many days per month:  4-9/30   Missed days of school/work in past 6 months:  1 day   Quality:   Left side of head, starts at base of skull and moves forward, causes pain behind left eye and if very severe cannot see out of left ey.    They last 2-3 days.  Pain feels like a constant tightness with a throbbing sharp pain, it worsens with standing or quick movement.   N&V:   Nausea only   Photo/phonophobia:  Both   Aura:  None        PMH:  Migraines, anxiety, stomach ulcers, GERD   C5-6 herniation a few years ago.    Social History:  Denies smoking or drug use, drinks alcohol 1-2 times per month, Full time nurse in pediatric ICU @ [employer], unmarried, no children.      Family  History: Mother-HTN.Sister has migraines, she also has some paternal cousins with migraine.  Paternal grandfather-stroke, MI.  Melanoma -maternal great grandmother.     Review of Systems   Constitutional: Negative for chills and fever.   HENT: Negative for hearing loss.    Eyes: Positive for blurred vision.        Blurred vision with migraines   Respiratory: Negative for cough.    Cardiovascular: Negative for chest pain.   Gastrointestinal: Negative for heartburn.  "  Genitourinary: Negative for dysuria.   Musculoskeletal: Positive for neck pain.   Skin: Negative for rash.   Neurological: Positive for headaches. Negative for dizziness, tingling, sensory change, focal weakness and weakness.   Endo/Heme/Allergies: Bruises/bleeds easily.   Psychiatric/Behavioral: Negative for depression. The patient is not nervous/anxious.         Current Outpatient Medications on File Prior to Visit   Medication Sig Dispense Refill   • EPINEPHrine (EPIPEN) 0.3 MG/0.3ML Solution Auto-injector solution for injection      • butalbital/apap/caffeine -40 mg (FIORICET) -40 MG Tab Take 1 Tab by mouth every four hours as needed for Headache. Per patient as needed     • asa/apap/caffeine (EXCEDRIN) 250-250-65 MG Tab Take 2 Tabs by mouth every 6 hours as needed for Headache (migraine).     • MAGNESIUM PO Take 1 Cap by mouth every day.     • Cholecalciferol (VITAMIN D3 PO) Take 1 Cap by mouth every day.     • VITAMIN E PO Take 1 Cap by mouth every day.     • multivitamin (THERAGRAN) Tab Take 1 Tab by mouth every day.       No current facility-administered medications on file prior to visit.      Past Medical History:   Diagnosis Date   • Acute kidney injury (HCC) 2/11/2020   • Asthma    • Migraines           Objective:   I personally reviewed imaging below and agree with findings    MRI brain 6/9/2020:   MRI of the brain without and with contrast within normal limits.    Encounter Vitals  Standard Vitals  Vitals  Blood Pressure: 106/62  Temperature: 36.8 °C (98.2 °F)  Temp src: Temporal  Pulse: 72  Respiration: 15   Pulse Oximetry: 98 %  Height: 162.6 cm (5' 4\")  Weight: 58.5 kg (128 lb 15.5 oz)  Encounter Vitals  Temperature: 36.8 °C (98.2 °F)  Temp src: Temporal  Blood Pressure: 106/62  Pulse: 72  Respiration: 15  Pulse Oximetry: 98 %  Weight: 58.5 kg (128 lb 15.5 oz)  Height: 162.6 cm (5' 4\")  BMI (Calculated): 22.14    Physical Exam    Constitutional:  Alert, no apparent distress,  Psych:   " mood and affect WNL  Neuro:  Oriented X 4, speech fluent, naming and memory intact  Muskuloskeletal:  Moves all extremities equally, strength 5/5 bilaterally, no drift  CN II: Visual fields are full to confrontation. Fundoscopic exam is normal with sharp discs and no vascular changes. Pupils are 4 mm and briskly reactive to light.   CN III, IV, VI  EOMs intact, no ptosis  CN V: Facial sensation is intact to pinprick in all 3 divisions bilaterally. Corneal responses are intact.  CN VII: Face is symmetric with normal eye closure and smile.  CN VII: Hearing is normal to rubbing fingers  CN IX, X: Palate elevates symmetrically. Phonation is normal.  CN XI: Head turning and shoulder shrug are intact  CN XII: Tongue is midline with normal movements and no atrophy.                           Sensation to PP equal bilaterally                 No limb ataxia with finger to nose and heel to shin                 Ambulates with steady gait.                 Rhomberg negative                Biceps,brachioradialis, tricep, patellar and ankle reflex all 2+     Cardiovascular:    S1S2, no abnormal rhythm auscultated, no peripheral edema  Neck:                     No carotid bruits noted   Pulmonary:            Respirations easy, lungs clear to auscultation all fields.     Skin:                     No obvious rashes.       Assessment/Plan:     1. Migraine without aura and with status migrainosus, not intractable         Blood pressure too low for propanolol  Strong family depression and suicide, she wants to avoid the anti-depressants  Took Topamax in the past, caused cognitive difficulty    She is only using condoms for birth control which makes the preventative treatment a little tricky.    For now she opts to try the supplements only with a rescue medicine.    For Rescue.  Maxalt ODT 10mg  1 tablet under tongue up to once daily PRN, no more than 9 month.     May continue Fioricet PRN per primary care.      Start magnesium oxide  400mg by mouth every night, may take extra dose if needed for headache (over the counter), hold for diarrhea         Start Riboflavin (Vitamin B2) 400mg by mouth every night (over the counter),may turn urine bright yellow         Start COQ 10, take 300mg every night. (over the counter)          Attempt to go to bed and get up at the same time every night           Eat meals on regular basis            Stay hydrated.             Aerobic exercise 30 minutes daily             Avoid aged or smoked foods, avoid processed foods, red wine, aged cheese              Keep headache diary, include foods that you may have eaten.             Avoid overusing over the counter medications:  Do not take more than 500mg acetaminophen (tylenol), more than 4 times weekly, more frequent or larger doses are associated with medication overuse headache.    I counseled patient on migraine triggers, lifestyle changes, medication overuse, supplements and medication side effects.    Follow up in 3 months.

## 2021-01-30 PROCEDURE — 0012A MODERNA SARS-COV-2 VACCINE: CPT

## 2021-01-30 PROCEDURE — 91301 MODERNA SARS-COV-2 VACCINE: CPT

## 2021-01-31 ENCOUNTER — IMMUNIZATION (OUTPATIENT)
Dept: FAMILY PLANNING/WOMEN'S HEALTH CLINIC | Facility: IMMUNIZATION CENTER | Age: 31
End: 2021-01-31
Payer: COMMERCIAL

## 2021-01-31 DIAGNOSIS — Z23 ENCOUNTER FOR VACCINATION: Primary | ICD-10-CM

## 2021-04-28 ENCOUNTER — TELEMEDICINE (OUTPATIENT)
Dept: MEDICAL GROUP | Facility: PHYSICIAN GROUP | Age: 31
End: 2021-04-28
Payer: COMMERCIAL

## 2021-04-28 VITALS — BODY MASS INDEX: 22.2 KG/M2 | HEART RATE: 78 BPM | WEIGHT: 130 LBS | HEIGHT: 64 IN

## 2021-04-28 DIAGNOSIS — G43.009 MIGRAINE WITHOUT AURA AND WITHOUT STATUS MIGRAINOSUS, NOT INTRACTABLE: ICD-10-CM

## 2021-04-28 DIAGNOSIS — Z02.89 ENCOUNTER FOR COMPLETION OF FORM WITH PATIENT: ICD-10-CM

## 2021-04-28 PROCEDURE — 99213 OFFICE O/P EST LOW 20 MIN: CPT | Mod: 95,CR | Performed by: NURSE PRACTITIONER

## 2021-04-28 ASSESSMENT — FIBROSIS 4 INDEX: FIB4 SCORE: 0.86

## 2021-04-28 NOTE — ASSESSMENT & PLAN NOTE
Requesting [facility] paperwork for her chronic migraines.  She is seeing some improvement.  Still working nights, which is a trigger at times.  Requesting intermittent leave one day/month for 2 days.

## 2021-04-28 NOTE — ASSESSMENT & PLAN NOTE
Is currently seeing neuro.  Taking magnesium and b vitamins, coq10 and reports that this is helping. 2-3 month reported.  maxalt for rescue is helping.  Has follow up in one more month.  Requesting FMLA 1 episode per month for 2 days.

## 2021-04-28 NOTE — PROGRESS NOTES
Virtual Visit: Established Patient   This visit was conducted via Zoom using secure and encrypted videoconferencing technology. The patient was in a private location in the state of [place].    The patient's identity was confirmed and verbal consent was obtained for this virtual visit.    Subjective:   CC:   Chief Complaint   Patient presents with   • Follow-Up   • Migraine   • Paperwork       Belgica Cesar is a 30 y.o. female presenting for evaluation and management of:    Migraine without aura and without status migrainosus, not intractable  Is currently seeing neuro.  Taking magnesium and b vitamins, coq10 and reports that this is helping. 2-3 month reported.  maxalt for rescue is helping.  Has follow up in one more month.  Requesting FMLA 1 episode per month for 2 days.     Encounter for completion of form with patient  Requesting FMLa paperwork for her chronic migraines.  She is seeing some improvement.  Still working nights, which is a trigger at times.  Requesting intermittent leave one day/month for 2 days.        ROS as stated in hpi  Denies any recent fevers or chills. No nausea or vomiting. No chest pains or shortness of breath.     Allergies   Allergen Reactions   • Pcn [Penicillins] Rash     Pt reports that she gets a full body rash   • Clindamycin Rash     Pt reports that she gets a full body rash       Current medicines (including changes today)  Current Outpatient Medications   Medication Sig Dispense Refill   • Coenzyme Q10 (CO Q 10 PO) Take  by mouth.     • EPINEPHrine (EPIPEN) 0.3 MG/0.3ML Solution Auto-injector solution for injection      • butalbital/apap/caffeine -40 mg (FIORICET) -40 MG Tab Take 1 Tab by mouth every four hours as needed for Headache. Per patient as needed     • rizatriptan (MAXALT-MLT) 10 MG disintegrating tablet Take 1 Tab by mouth 1 time a day as needed for Migraine. 9 Tab 11   • asa/apap/caffeine (EXCEDRIN) 250-250-65 MG Tab Take 2 Tabs by mouth every 6  "hours as needed for Headache (migraine).     • MAGNESIUM PO Take 1 Cap by mouth every day.     • Cholecalciferol (VITAMIN D3 PO) Take 1 Cap by mouth every day.     • VITAMIN E PO Take 1 Cap by mouth every day.     • multivitamin (THERAGRAN) Tab Take 1 Tab by mouth every day.       No current facility-administered medications for this visit.       Patient Active Problem List    Diagnosis Date Noted   • Encounter for completion of form with patient 04/28/2021   • Anxiety 06/01/2020   • Leukocytosis 02/11/2020   • Well adult exam 04/19/2018   • Migraine without aura and without status migrainosus, not intractable 03/15/2018   • History of stomach ulcers 03/15/2018       Family History   Problem Relation Age of Onset   • Hypertension Mother    • No Known Problems Father        She  has a past medical history of Acute kidney injury (HCC) (2/11/2020), Asthma, and Migraines. She also has no past medical history of CAD (coronary artery disease), Cancer (HCC), Chronic obstructive pulmonary disease (HCC), Congestive heart failure (HCC), Diabetes (HCC), Hypertension, Infectious disease, Liver disease, Psychiatric disorder, Seizure disorder (HCC), or Stroke ([facility]).  She  has no past surgical history on file.       Objective:   Pulse 78   Ht 1.626 m (5' 4\")   Wt 59 kg (130 lb)   BMI 22.31 kg/m²     Physical Exam:  Constitutional: Alert, no distress, well-groomed.  Skin: No rashes in visible areas.  Eye: Round. Conjunctiva clear, lids normal. No icterus.   ENMT: Lips pink without lesions, good dentition, moist mucous membranes. Phonation normal.  Neck: No masses, no thyromegaly. Moves freely without pain.  Respiratory: Unlabored respiratory effort, no cough or audible wheeze  Psych: Alert and oriented x3, normal affect and mood.       Assessment and Plan:   The following treatment plan was discussed:     1. Migraine without aura and without status migrainosus, not intractable  Chronic, ongoing, improving with supplements.  " Followed by neuro headache clinic.  Next appointment next month.  Jarrett is working well as rescue.     2. Encounter for completion of form with patient  Visit today to discuss completion of FMLA form for Intermittent leave 1/month for 2 day episodes for her chronic migraine headaches.  She will drop off form for me to complete.  Monitor.     Other orders  - Coenzyme Q10 (CO Q 10 PO); Take  by mouth.        Follow-up: No follow-ups on file.

## 2021-05-10 ENCOUNTER — PATIENT MESSAGE (OUTPATIENT)
Dept: MEDICAL GROUP | Facility: PHYSICIAN GROUP | Age: 31
End: 2021-05-10

## 2021-05-12 NOTE — PROGRESS NOTES
Pt here for new pt appt  Pt states that she would like to talk about egg donation that she had done  previous   Pharmacy verified  Good #: [phone]  PAP:2017 [facility]  BC:none

## 2021-05-14 ENCOUNTER — GYNECOLOGY VISIT (OUTPATIENT)
Dept: OBGYN | Facility: CLINIC | Age: 31
End: 2021-05-14
Payer: COMMERCIAL

## 2021-05-14 ENCOUNTER — HOSPITAL ENCOUNTER (OUTPATIENT)
Facility: MEDICAL CENTER | Age: 31
End: 2021-05-14
Attending: OBSTETRICS & GYNECOLOGY
Payer: COMMERCIAL

## 2021-05-14 VITALS — WEIGHT: 132 LBS | DIASTOLIC BLOOD PRESSURE: 83 MMHG | SYSTOLIC BLOOD PRESSURE: 118 MMHG | BODY MASS INDEX: 22.66 KG/M2

## 2021-05-14 DIAGNOSIS — Z12.4 CERVICAL CANCER SCREENING: ICD-10-CM

## 2021-05-14 DIAGNOSIS — Z01.419 ENCOUNTER FOR WELL WOMAN EXAM WITH ROUTINE GYNECOLOGICAL EXAM: Primary | ICD-10-CM

## 2021-05-14 PROCEDURE — 88175 CYTOPATH C/V AUTO FLUID REDO: CPT

## 2021-05-14 PROCEDURE — 99385 PREV VISIT NEW AGE 18-39: CPT | Performed by: OBSTETRICS & GYNECOLOGY

## 2021-05-14 PROCEDURE — 87624 HPV HI-RISK TYP POOLED RSLT: CPT

## 2021-05-14 ASSESSMENT — FIBROSIS 4 INDEX: FIB4 SCORE: 0.86

## 2021-05-14 NOTE — PROGRESS NOTES
Well Woman Exam    CC: well woman exam        HPI: Belgica Cesar is a 30 y.o.  female who presents for her Annual Gynecologic Exam  Pt has no complaints.  Currently using nothing for contraception, sexually active with 1 male partner.  Thinking of actively trying to get pregnant in the near future, would be okay with pregnancy but happened now.    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    Health Maintenance:  Pap: reports several yrs  Gardisil: completed     ROS:  12 system ROS performed, pertinent positives and negatives as above in HPI; see intake form for details.      OB History    Para Term  AB Living   0 0 0 0 0 0   SAB TAB Ectopic Molar Multiple Live Births   0 0 0 0 0 0         GYN Hx:   q30d/5d  Denies hx of STIs  Denies hx of abnl paps   Has donated eggs several times    PMHx: exercise induced asthma, migraines (no aura), depression    PSHx: denies    Medications:   Current Outpatient Medications Ordered in Epic   Medication Sig Dispense Refill   • ESOMEPRAZOLE MAGNESIUM PO Take  by mouth.     • Coenzyme Q10 (CO Q 10 PO) Take  by mouth.     • MAGNESIUM PO Take 1 Cap by mouth every day.     • Cholecalciferol (VITAMIN D3 PO) Take 1 Cap by mouth every day.     • VITAMIN E PO Take 1 Cap by mouth every day.     • asa/apap/caffeine (EXCEDRIN) 250-250-65 MG Tab Take 2 Tabs by mouth every 6 hours as needed for Headache (migraine).     • multivitamin (THERAGRAN) Tab Take 1 Tab by mouth every day.       No current [facility]-ordered facility-administered medications on file.       Allergies: Pcn [penicillins] and Clindamycin    Social History     Tobacco Use   • Smoking status: Never Smoker   • Smokeless tobacco: Never Used   Vaping Use   • Vaping Use: Never used   Substance Use Topics   • Alcohol use: Yes     Comment: occasional   • Drug use: No       Family History   Problem Relation Age of Onset   • Hypertension Mother    • No Known Problems Father      Denies hx of colon/ovarian/breast  cancers  Melanoma in grandparent's generation  PGF with stroke in 40s, no other VTEs.        Physical Exam   /83   Wt 59.9 kg (132 lb)   LMP 04/15/2021   Breastfeeding No   BMI 22.66 kg/m²   gen: AAO, NAD, affect appropriate  Neck: non-tender, no masses, no thyromegaly/nodules appreciated  CV: RRR; no LE edema  resp: ctab  Breast: symmetric, no skin changes, no masses, nontender, no nipple discharge, no lymphadenopathy  abd: soft, NT, ND, no masses, no organomegaly, no hernias  : NEFG, normal urethral meatus, normal anus/perineum, normal vagina and cervix.  Uterus small, anteverted, no adnexal masses/tenderness  Skin: warm/dry, no lesions    Breast and pelvic exam chaperoned by MA (AB).  Assessment:   30 y.o.  here for well woman exam  1. Encounter for well woman exam with routine gynecological exam     2. Cervical cancer screening  THINPREP PAP WITH HPV       Plan:   Pap:collected, reviewed pap guidelines    Continue folic acid for possible pregnancy in the near future      Follow up in 1 year for WWE or PRN       aDylin Leal MD  [facility], Women's Health

## 2021-05-17 LAB
CYTOLOGY REG CYTOL: NORMAL
HPV HR 12 DNA CVX QL NAA+PROBE: NEGATIVE
HPV16 DNA SPEC QL NAA+PROBE: NEGATIVE
HPV18 DNA SPEC QL NAA+PROBE: NEGATIVE
SPECIMEN SOURCE: NORMAL

## 2021-05-27 ENCOUNTER — OFFICE VISIT (OUTPATIENT)
Dept: MEDICAL GROUP | Facility: PHYSICIAN GROUP | Age: 31
End: 2021-05-27
Payer: COMMERCIAL

## 2021-05-27 VITALS
RESPIRATION RATE: 16 BRPM | OXYGEN SATURATION: 99 % | SYSTOLIC BLOOD PRESSURE: 120 MMHG | TEMPERATURE: 97.7 F | BODY MASS INDEX: 22.53 KG/M2 | DIASTOLIC BLOOD PRESSURE: 72 MMHG | HEIGHT: 64 IN | HEART RATE: 64 BPM | WEIGHT: 132 LBS

## 2021-05-27 DIAGNOSIS — F50.2 BULIMIA NERVOSA IN FULL REMISSION: ICD-10-CM

## 2021-05-27 DIAGNOSIS — L82.0 SEBORRHEIC KERATOSES, INFLAMED: ICD-10-CM

## 2021-05-27 DIAGNOSIS — K21.00 GASTROESOPHAGEAL REFLUX DISEASE WITH ESOPHAGITIS WITHOUT HEMORRHAGE: ICD-10-CM

## 2021-05-27 PROBLEM — F50.25: Status: ACTIVE | Noted: 2021-05-27

## 2021-05-27 PROBLEM — D22.9 NEVUS: Status: ACTIVE | Noted: 2021-05-27

## 2021-05-27 PROCEDURE — 99213 OFFICE O/P EST LOW 20 MIN: CPT | Performed by: NURSE PRACTITIONER

## 2021-05-27 ASSESSMENT — FIBROSIS 4 INDEX: FIB4 SCORE: 0.86

## 2021-05-27 NOTE — PROGRESS NOTES
Chief Complaint   Patient presents with   • Nevus     right shoulder blade   • Gastrophageal Reflux       Subjective:   Belgica Cesar is a 30 y.o. female here today for evaluation and management of:    Gastroesophageal reflux disease with esophagitis  Reports ongoing GERD, past hx of bulemia.  Has tried omeprazole, nexium. Would like to see GI for EGD to rule out esophageal changes from past purging.  9 years since she purged.      Bulimia nervosa in full remission  9 years since purging.  Having some ongoing, worsening GERD not responding to OTC ppi.  Would like to see GI.      Seborrheic keratoses, inflamed  Right shoulder with 8 mm brown, raised lesion, consistent with SK.  Irritated with recent trip to [place].             Current medicines (including changes today)  Current Outpatient Medications   Medication Sig Dispense Refill   • Coenzyme Q10 (CO Q 10 PO) Take  by mouth.     • EPINEPHrine (EPIPEN) 0.3 MG/0.3ML Solution Auto-injector solution for injection      • butalbital/apap/caffeine -40 mg (FIORICET) -40 MG Tab Take 1 Tab by mouth every four hours as needed for Headache. Per patient as needed     • rizatriptan (MAXALT-MLT) 10 MG disintegrating tablet Take 1 Tab by mouth 1 time a day as needed for Migraine. 9 Tab 11   • asa/apap/caffeine (EXCEDRIN) 250-250-65 MG Tab Take 2 Tabs by mouth every 6 hours as needed for Headache (migraine).     • MAGNESIUM PO Take 1 Cap by mouth every day.     • Cholecalciferol (VITAMIN D3 PO) Take 1 Cap by mouth every day.     • VITAMIN E PO Take 1 Cap by mouth every day.     • multivitamin (THERAGRAN) Tab Take 1 Tab by mouth every day.     • ESOMEPRAZOLE MAGNESIUM PO Take  by mouth.       No current facility-administered medications for this visit.     She  has a past medical history of Acute kidney injury (HCC) (2/11/2020), Anxiety, Asthma, Bulimia nervosa in full remission (5/27/2021), and Migraines. She also has no past medical history of Addisons disease  "(HCC), Adrenal disorder (HCC), Allergy, Anemia, Arrhythmia, Arthritis, Blood transfusion without reported diagnosis, CAD (coronary artery disease), Cancer (HCC), Cataract, Chronic obstructive pulmonary disease (HCC), Clotting disorder (HCC), Congestive heart failure (HCC), Cushings syndrome (HCC), Depression, Diabetes (HCC), Diabetic neuropathy (HCC), GERD (gastroesophageal reflux disease), Glaucoma, Goiter, Head ache, Heart attack (HCC), Heart murmur, HIV (human immunodeficiency virus infection) (HCC), Hyperlipidemia, Hypertension, IBD (inflammatory bowel disease), Infectious disease, Liver disease, Meningitis, Muscle disorder, Osteoporosis, Parathyroid disorder (HCC), Pituitary disease (HCC), Pulmonary emphysema (HCC), Seizure (HCC), Seizure disorder (HCC), Sickle cell disease (HCC), Stroke (HCC), Substance abuse (HCC), Thyroid disease, Tuberculosis, or Urinary tract infection.    ROS as stated in hpi  No chest pain, no shortness of breath, no abdominal pain       Objective:     /72 (BP Location: Left arm, Patient Position: Sitting)   Pulse 64   Temp 36.5 °C (97.7 °F) (Temporal)   Resp 16   Ht 1.626 m (5' 4\")   Wt 59.9 kg (132 lb)   SpO2 99%  Body mass index is 22.66 kg/m².   Physical Exam:  Constitutional: Alert, no distress.  Skin: Warm, dry, good turgor,no cyanosis, no rashes in visible areas.  8 mm raised, inflammed SK right shoulder.   Eye: Equal, round and reactive, conjunctiva clear, lids normal.  Neck: Trachea midline, no masses, no obvious thyroid enlargement.. No cervical or supraclavicular lymphadenopathy. Range of motion within normal limits.  Neuro: Cranial nerves 2-12 grossly intact.  No sensory deficit.  Respiratory: Unlabored respiratory effort,Abdomen: Soft, non-tender, no masses, no guarding,  no hepatosplenomegaly.  Psych: Alert and oriented x3, normal affect and mood and judgement.        Assessment and Plan:   The following treatment plan was discussed    1. Seborrheic keratoses, " inflamed  Acute issue. Non complicated.  CRYOTHERAPY:  Discussed risks and benefits of cryotherapy. Patient verbally agreed. 3 applications of cryotherapy were applied to one lesion on right shoulder. Patient tolerated procedure well. Aftercare instructions given.      2. Gastroesophageal reflux disease with esophagitis without hemorrhage  Chronic, ongoing, worsening issue.  Has not found relief with OTC PPI.  Will try famotidine.  Due to hx of bulemia, we will refer to GI for possible EGD.  Monitor.   - REFERRAL TO GASTROENTEROLOGY    3. Bulimia nervosa in full remission  Chronic, ongoing, in remission.  Worsening GERD.  Referral placed.  Advised to try famotidine prior to going so she can report on efficacy of the OTC meds she has tried.  Monitor.   - REFERRAL TO GASTROENTEROLOGY      Followup: No follow-ups on file.         Educated in proper administration of medication(s) ordered today including safety, possible SE, risks, benefits, rationale and alternatives to therapy.     Please note that this dictation was created using voice recognition software. I have made every reasonable attempt to correct obvious errors, but I expect that there are errors of grammar and possibly content that I did not discover before finalizing the note.

## 2021-05-27 NOTE — ASSESSMENT & PLAN NOTE
Right shoulder with 8 mm brown, raised lesion, consistent with SK.  Irritated with recent trip to [place].

## 2021-05-27 NOTE — ASSESSMENT & PLAN NOTE
Reports ongoing GERD, past hx of bulemia.  Has tried omeprazole, nexium. Would like to see GI for EGD to rule out esophageal changes from past purging.  9 years since she purged.

## 2021-06-29 DIAGNOSIS — G43.001 MIGRAINE WITHOUT AURA AND WITH STATUS MIGRAINOSUS, NOT INTRACTABLE: ICD-10-CM

## 2021-06-30 RX ORDER — RIZATRIPTAN BENZOATE 10 MG/1
10 TABLET, ORALLY DISINTEGRATING ORAL
Qty: 9 TABLET | Refills: 1 | Status: SHIPPED | OUTPATIENT
Start: 2021-06-30 | End: 2022-06-30

## 2021-08-19 ENCOUNTER — HOSPITAL ENCOUNTER (OUTPATIENT)
Dept: RADIOLOGY | Facility: MEDICAL CENTER | Age: 31
End: 2021-08-19
Attending: NURSE PRACTITIONER
Payer: COMMERCIAL

## 2021-08-19 DIAGNOSIS — K21.9 GASTROESOPHAGEAL REFLUX DISEASE, UNSPECIFIED WHETHER ESOPHAGITIS PRESENT: ICD-10-CM

## 2021-08-19 DIAGNOSIS — R14.0 BLOATING: ICD-10-CM

## 2021-08-19 DIAGNOSIS — R13.19 OTHER DYSPHAGIA: ICD-10-CM

## 2021-08-19 DIAGNOSIS — R11.2 INTRACTABLE VOMITING WITH NAUSEA, UNSPECIFIED VOMITING TYPE: ICD-10-CM

## 2021-08-19 DIAGNOSIS — R19.4 ALTERED BOWEL HABITS: ICD-10-CM

## 2021-08-19 PROCEDURE — 74220 X-RAY XM ESOPHAGUS 1CNTRST: CPT

## 2021-08-19 PROCEDURE — 700117 HCHG RX CONTRAST REV CODE 255: Performed by: NURSE PRACTITIONER

## 2021-08-19 RX ADMIN — BARIUM SULFATE 700 MG: 700 TABLET ORAL at 11:04

## 2021-10-13 ENCOUNTER — OFFICE VISIT (OUTPATIENT)
Dept: PHYSICAL MEDICINE AND REHAB | Facility: MEDICAL CENTER | Age: 31
End: 2021-10-13
Payer: COMMERCIAL

## 2021-10-13 VITALS
WEIGHT: 130.51 LBS | SYSTOLIC BLOOD PRESSURE: 118 MMHG | DIASTOLIC BLOOD PRESSURE: 84 MMHG | HEART RATE: 60 BPM | BODY MASS INDEX: 22.28 KG/M2 | TEMPERATURE: 98.1 F | HEIGHT: 64 IN | OXYGEN SATURATION: 99 %

## 2021-10-13 DIAGNOSIS — M54.12 CERVICAL RADICULOPATHY: ICD-10-CM

## 2021-10-13 DIAGNOSIS — M50.20 CERVICAL DISC HERNIATION: ICD-10-CM

## 2021-10-13 PROCEDURE — 99214 OFFICE O/P EST MOD 30 MIN: CPT | Performed by: PHYSICAL MEDICINE & REHABILITATION

## 2021-10-13 ASSESSMENT — PAIN SCALES - GENERAL: PAINLEVEL: 4=SLIGHT-MODERATE PAIN

## 2021-10-13 ASSESSMENT — FIBROSIS 4 INDEX: FIB4 SCORE: 0.86

## 2021-10-13 ASSESSMENT — PATIENT HEALTH QUESTIONNAIRE - PHQ9: CLINICAL INTERPRETATION OF PHQ2 SCORE: 0

## 2021-10-13 NOTE — PROGRESS NOTES
Follow up patient note  Interventional spine and sports physiatry, Physical medicine rehabilitation      Chief complaint:   Chief Complaint   Patient presents with   • Follow-Up     neck pain         HISTORY    Please see new patient note by Dr Ordonez,  for more details.     HPI  Patient identification: Belgica Cesar  1990,   female  With Diagnoses of Cervical disc herniation and Cervical radiculopathy were pertinent to this visit.   Procedures:  2019: C7-T1 interlaminar epidural steroid injection with significant improvement post procedure especially in the radicular component     The patient is had a recurrence of her cervical radiculopathy.  The majority the patient's pain is in the left side of the neck radiating to the left shoulder.  This is 7 out of 10 in intensity constant.  There is also some right periscapular pain but this is mostly left-sided pain.  She has tried her home exercise program which I provided to her previously and she has tried this including the past several months.  She also has an extensive home exercise program that she has been doing.    Medications tried include Excedrin, ibuprofen       ROS Red Flags :   Fever, Chills, Sweats: Denies  Involuntary Weight Loss: Denies  Bowel/Bladder Incontinence: Denies  Saddle Anesthesia: Denies        PMHx:   Past Medical History:   Diagnosis Date   • Acute kidney injury (HCC) 2020   • Anxiety    • Asthma    • Bulimia nervosa in full remission 2021   • Migraines        PSHx:   History reviewed. No pertinent surgical history.    Family history   Family History   Problem Relation Age of Onset   • Hypertension Mother    • No Known Problems Father          Medications:   Outpatient Medications Marked as Taking for the 10/13/21 encounter (Office Visit) with Gilles Ordonez M.D.   Medication Sig Dispense Refill   • Pantoprazole Sodium (PROTONIX PO) Take 20 mg by mouth every day.     • rizatriptan (MAXALT-MLT) 10 MG disintegrating  tablet Take 1 tablet by mouth 1 time a day as needed for Migraine. 9 tablet 1   • ESOMEPRAZOLE MAGNESIUM PO Take  by mouth.     • Coenzyme Q10 (CO Q 10 PO) Take  by mouth.     • EPINEPHrine (EPIPEN) 0.3 MG/0.3ML Solution Auto-injector solution for injection      • butalbital/apap/caffeine -40 mg (FIORICET) -40 MG Tab Take 1 Tab by mouth every four hours as needed for Headache. Per patient as needed     • asa/apap/caffeine (EXCEDRIN) 250-250-65 MG Tab Take 2 Tabs by mouth every 6 hours as needed for Headache (migraine).     • MAGNESIUM PO Take 1 Cap by mouth every day.     • Cholecalciferol (VITAMIN D3 PO) Take 1 Cap by mouth every day.     • VITAMIN E PO Take 1 Cap by mouth every day.     • multivitamin (THERAGRAN) Tab Take 1 Tab by mouth every day.          Current Outpatient Medications on File Prior to Visit   Medication Sig Dispense Refill   • Pantoprazole Sodium (PROTONIX PO) Take 20 mg by mouth every day.     • rizatriptan (MAXALT-MLT) 10 MG disintegrating tablet Take 1 tablet by mouth 1 time a day as needed for Migraine. 9 tablet 1   • ESOMEPRAZOLE MAGNESIUM PO Take  by mouth.     • Coenzyme Q10 (CO Q 10 PO) Take  by mouth.     • EPINEPHrine (EPIPEN) 0.3 MG/0.3ML Solution Auto-injector solution for injection      • butalbital/apap/caffeine -40 mg (FIORICET) -40 MG Tab Take 1 Tab by mouth every four hours as needed for Headache. Per patient as needed     • asa/apap/caffeine (EXCEDRIN) 250-250-65 MG Tab Take 2 Tabs by mouth every 6 hours as needed for Headache (migraine).     • MAGNESIUM PO Take 1 Cap by mouth every day.     • Cholecalciferol (VITAMIN D3 PO) Take 1 Cap by mouth every day.     • VITAMIN E PO Take 1 Cap by mouth every day.     • multivitamin (THERAGRAN) Tab Take 1 Tab by mouth every day.       No current facility-administered medications on file prior to visit.         Allergies:   Allergies   Allergen Reactions   • Pcn [Penicillins] Rash     Pt reports that she gets a  full body rash   • Clindamycin Rash     Pt reports that she gets a full body rash       Social Hx:   Social History     Socioeconomic History   • Marital status: Single     Spouse name: Not on file   • Number of children: Not on file   • Years of education: Not on file   • Highest education level: Not on file   Occupational History   • Not on file   Tobacco Use   • Smoking status: Never Smoker   • Smokeless tobacco: Never Used   Vaping Use   • Vaping Use: Never used   Substance and Sexual Activity   • Alcohol use: Yes     Comment: occasional   • Drug use: No   • Sexual activity: Yes     Partners: Male   Other Topics Concern   •  Service No   • Blood Transfusions No   • Caffeine Concern No   • Occupational Exposure No   • Hobby Hazards No   • Sleep Concern No   • Stress Concern No   • Weight Concern No   • Special Diet No   • Back Care No   • Exercise Yes   • Bike Helmet Yes   • Seat Belt Yes   • Self-Exams Yes   Social History Narrative   • Not on file     Social Determinants of Health     Financial Resource Strain:    • Difficulty of Paying Living Expenses:    Food Insecurity:    • Worried About Running Out of Food in the Last Year:    • Ran Out of Food in the Last Year:    Transportation Needs:    • Lack of Transportation (Medical):    • Lack of Transportation (Non-Medical):    Physical Activity:    • Days of Exercise per Week:    • Minutes of Exercise per Session:    Stress:    • Feeling of Stress :    Social Connections:    • Frequency of Communication with Friends and Family:    • Frequency of Social Gatherings with Friends and Family:    • Attends [employer] Services:    • Active Member of Clubs or Organizations:    • Attends Club or Organization Meetings:    • Marital Status:    Intimate Partner Violence:    • Fear of Current or Ex-Partner:    • Emotionally Abused:    • Physically Abused:    • Sexually Abused:          EXAMINATION     Physical Exam:   Vitals: /84 (BP Location: Right arm, Patient  "Position: Sitting, BP Cuff Size: Adult)   Pulse 60   Temp 36.7 °C (98.1 °F) (Temporal)   Ht 1.626 m (5' 4\")   Wt 59.2 kg (130 lb 8.2 oz)   SpO2 99%     Constitutional:   Body Habitus: Body mass index is 22.4 kg/m².  Cooperation: Fully cooperates with exam  Appearance: Well-groomed no disheveled    Respiratory-  breathing comfortable on room air, no audible wheezing  Cardiovascular- capillary refills less than 2 seconds. No lower extremity edema is noted.   Psychiatric- alert and oriented ×3. Normal affect.    MSK/NEURO: -     Cervical spine  There are no signs of infection around the injection sites.     decreased active range of motion with flexion, lateral flexion, and rotation bilaterally.   There is decreased active range of motion with cervical extension.      Palpation:   Tenderness to palpation throughout the cervical spine: negative bilaterally        Cervical spine Special tests  Neuro tension  Spurling's maneuver negative right, positive left           Key points for the international standards for neurological classification of spinal cord injury (ISNCSCI) to light touch.     Dermatome R L   C4 2 2   C5 2 2   C6 2 2   C7 2 2   C8 2 2   T1 2 2   T2 2 2                                         Motor Exam Upper Extremities   ? Myotome R L   Shoulder flexion C5 5 5   Elbow flexion C5 5 5   Wrist extension C6 5 5   Elbow extension C7 5 5   Finger flexion C8 5 5   Finger abduction T1 5 5             MEDICAL DECISION MAKING    DATA    Labs:   Lab Results   Component Value Date/Time    SODIUM 138 12/28/2020 02:49 PM    POTASSIUM 3.5 (L) 12/28/2020 02:49 PM    CHLORIDE 102 12/28/2020 02:49 PM    CO2 29 12/28/2020 02:49 PM    GLUCOSE 121 (H) 12/28/2020 02:49 PM    BUN 14 12/28/2020 02:49 PM    CREATININE 0.79 12/28/2020 02:49 PM        No results found for: PROTHROMBTM, INR     Lab Results   Component Value Date/Time    WBC 13.4 (H) 02/11/2020 03:00 PM    RBC 4.32 02/11/2020 03:00 PM    HEMOGLOBIN 13.5 " 2020 03:00 PM    HEMATOCRIT 40.6 2020 03:00 PM    MCV 94.0 2020 03:00 PM    MCH 31.3 2020 03:00 PM    MCHC 33.3 (L) 2020 03:00 PM    MPV 12.0 2020 03:00 PM    NEUTSPOLYS 54.20 10/11/2017 07:57 PM    LYMPHOCYTES 34.40 10/11/2017 07:57 PM    MONOCYTES 8.30 10/11/2017 07:57 PM    EOSINOPHILS 2.40 10/11/2017 07:57 PM    BASOPHILS 0.50 10/11/2017 07:57 PM        No results found for: HBA1C       Imaging:   I personally reviewed following images  MRI cervical spine 10/14/119  At C5-6 there is a disc herniation in the left paracentric region with neuroforaminal stenosis on the left side C5-6.  No significant central canal stenosis.    I reviewed the following radiology reports                                 Results for orders placed during the hospital encounter of 10/14/19   MR-CERVICAL SPINE-W/O    Impression 1. Disc desiccation at C5-6 with annular fissure and posterior broad-based disc protrusion along the left lateral recess and left neuroforamen. Mild narrowing of the left lateral recess and left neuroforamen. No spinal canal narrowing. The left C6 nerve   root is likely impinged.    2. Unremarkable appearance at other levels.                                                                Results for orders placed in visit on 19   DX-CERVICAL SPINE-2 OR 3 VIEWS    Impression Normal cervical spine series.                                                                                  Results for orders placed during the hospital encounter of 18   DX-WRIST-COMPLETE 3+ LEFT    Impression No acute osseous abnormality.           DIAGNOSIS   Visit Diagnoses     ICD-10-CM   1. Cervical disc herniation  M50.20   2. Cervical radiculopathy  M54.12         ASSESSMENT and PLAN:     Belgica Cesar  1990,   female      Belgica was seen today for follow-up.    Diagnoses and all orders for this visit:    Cervical disc herniation    Cervical radiculopathy  -     REFERRAL TO  PHYSICAL MEDICINE REHAB        The patient is a recurrence of her cervical radiculopathy.    I have ordered a C7-T1 interlaminar epidural steroid injection    In the interim she can use ibuprofen up to 800 mg 3 times daily as needed pain during flares of pain but she should stop this medication 5 days prior to procedure above.    Follow up: After the above procedure    Thank you for allowing me to participate in the care of this patient. If you have any questions please not hesitate to contact me.            Please note that this dictation was created using voice recognition software. I have made every reasonable attempt to correct obvious errors but there may be errors of grammar and content that I may have overlooked prior to finalization of this note.      Gilles Ordonez MD  Interventional Spine and Sports Physiatry  Physical Medicine and Rehabilitation  [facility]

## 2021-10-13 NOTE — PATIENT INSTRUCTIONS
Your procedure will be at the [facility] special procedure suite.    [facility] [place] [address]       PRE-PROCEDURE INSTRUCTIONS  You may take your regular medications except:   · No Anti-inflammatories 5 days prior to your procedure. Anti-inflammatories include medicines such as  ibuprofen (Motrin, Advil), Excedrin, Naproxen (Aleve, Anaprox, Naprelan, Naprosyn), Celecoxib (Celebrex), Diclofenac (Voltaren-XR tab), and Meloxicam (Mobic).   · You can take the remainder of your pain medications as prescribed.   · If you are having a diagnostic procedure such as a medial branch block, do not use her pain meds on the day of the procedure  · No Glucophage or Metformin 24 hours before your procedure. You may resume next day after your procedure.  · Call the physiatry office if you are taking or prescribed anti-biotics within five days of procedure.  · Please ask provider if you are taking any new diabetes medication.  · CONTINUE TAKING BLOOD PRESSURE MEDICATIONS AS PRESCRIBED.  · Pain medications will not be prescribed on the procedure day. Procedural pain medication may be used by your provider   · Call your doctor's office performing the procedure if you have a fever, chills, rash or new illness prior to your procedure    Anticoagulation/antiplatelet medications  No Blood thinning medications such as Coumadin, Xarelto, aspirin or Plavix 5 days prior to procedure unless your doctor said to continue these medications. Call your doctor if a new medication is prescribed in this class.     Restrictions for eating before procedure:   · If you are getting procedural sedation, then do not eat to for 8 hours prior to procedure appointment time. Do not drink fluids for four hours prior to your procedure time.   · If you are not having procedural sedation, then Skip the meal prior to your procedure. If you have a morning procedure then skip breakfast. If you have an afternoon procedure then skip lunch.   · You  may drink clear liquids up to 2 hours prior to your procedure  · You must have a  the day of procedure to accompany you home.      POST PROCEDURE INSTRUCTIONS   · No heavy lifting, strenuous bending or strenuous exercise for 3 days after your procedure.  · No hot tubs, baths, swimming for 3 days after your procedure  · You can remove the bandage the day after the procedure.  · IF YOU RECEIVED A STEROID INJECTION. PLEASE NOTE THAT THERE MAY BE A DELAY FOR THE INJECTION TO START WORKING, THE DELAY MAY BE UP TO TWO WEEKS. IF YOU HAVE DIABETES, PLEASE NOTE THAT YOUR SUGAR LEVELS MAY BE ELEVATED FOR 1-2 DAYS AFTER A STEROID INJECTION.  THE STEROID MAY CAUSE TEMPORARY SYMPTOMS WHICH USUALLY RESOLVE ON THEIR OWN WITHIN 1 TO 2 DAYS INCLUDING FACIAL FLUSHING OR A FEELING OF WARMTH ON THE FACE, TEMPORARY INCREASES IN BLOOD SUGAR, INSOMNIA, INCREASED HUNGER  · IF YOU EXPERIENCE PROLONGED WEAKNESS LONGER THAN ONE DAY, BOWEL OR BLADDER INCONTINENCE THEN PLEASE CALL THE PHYSIATRY OFFICE.  · Your leg may feel heavy, weak and numb for up to 1-2 days. Be very careful walking.    You may resume normal activities 3 days after procedure.

## 2021-10-27 ENCOUNTER — HOSPITAL ENCOUNTER (OUTPATIENT)
Facility: REHABILITATION | Age: 31
End: 2021-10-27
Attending: PHYSICAL MEDICINE & REHABILITATION | Admitting: PHYSICAL MEDICINE & REHABILITATION
Payer: COMMERCIAL

## 2021-10-27 ENCOUNTER — HOSPITAL ENCOUNTER (OUTPATIENT)
Dept: RADIOLOGY | Facility: REHABILITATION | Age: 31
End: 2021-10-27
Attending: PHYSICAL MEDICINE & REHABILITATION
Payer: COMMERCIAL

## 2021-10-27 VITALS
OXYGEN SATURATION: 100 % | TEMPERATURE: 98.1 F | SYSTOLIC BLOOD PRESSURE: 116 MMHG | DIASTOLIC BLOOD PRESSURE: 73 MMHG | HEART RATE: 74 BPM | RESPIRATION RATE: 15 BRPM

## 2021-10-27 PROCEDURE — 700111 HCHG RX REV CODE 636 W/ 250 OVERRIDE (IP)

## 2021-10-27 PROCEDURE — 700117 HCHG RX CONTRAST REV CODE 255

## 2021-10-27 PROCEDURE — 62321 NJX INTERLAMINAR CRV/THRC: CPT

## 2021-10-27 RX ORDER — LIDOCAINE HYDROCHLORIDE 10 MG/ML
INJECTION, SOLUTION EPIDURAL; INFILTRATION; INTRACAUDAL; PERINEURAL
Status: COMPLETED
Start: 2021-10-27 | End: 2021-10-27

## 2021-10-27 RX ORDER — DEXAMETHASONE SODIUM PHOSPHATE 10 MG/ML
INJECTION, SOLUTION INTRAMUSCULAR; INTRAVENOUS
Status: COMPLETED
Start: 2021-10-27 | End: 2021-10-27

## 2021-10-27 RX ADMIN — DEXAMETHASONE SODIUM PHOSPHATE 10 MG: 10 INJECTION, SOLUTION INTRAMUSCULAR; INTRAVENOUS at 15:55

## 2021-10-27 RX ADMIN — LIDOCAINE HYDROCHLORIDE 10 ML: 10 INJECTION, SOLUTION EPIDURAL; INFILTRATION; INTRACAUDAL; PERINEURAL at 15:53

## 2021-10-27 RX ADMIN — IOHEXOL 5 ML: 240 INJECTION, SOLUTION INTRATHECAL; INTRAVASCULAR; INTRAVENOUS; ORAL at 15:55

## 2021-10-27 ASSESSMENT — PAIN DESCRIPTION - PAIN TYPE
TYPE: CHRONIC PAIN
TYPE: CHRONIC PAIN

## 2021-10-27 NOTE — H&P
Physical medicine and rehabilitation preprocedure history & Physical Note    Date  10/27/2021    Primary Care Physician  HANK Chen  Pre-Op Diagnosis Codes:     * Cervical radiculopathy [M54.12]     HPI  This is a 30 y.o. female who presented with neck pain rating to the left shoulder.  Chronic.  Failed conservative treatments with home exercise program and medication management.    See previous notes of Dr. Ordonez    Past Medical History:   Diagnosis Date   • Acute kidney injury (HCC) 2/11/2020   • Anxiety    • Asthma    • Bulimia nervosa in full remission 5/27/2021   • Migraines        No past surgical history on file.    No current facility-administered medications for this encounter.     Current Outpatient Medications   Medication Sig Dispense Refill   • Pantoprazole Sodium (PROTONIX PO) Take 20 mg by mouth every day.     • rizatriptan (MAXALT-MLT) 10 MG disintegrating tablet Take 1 tablet by mouth 1 time a day as needed for Migraine. 9 tablet 1   • ESOMEPRAZOLE MAGNESIUM PO Take  by mouth.     • Coenzyme Q10 (CO Q 10 PO) Take  by mouth.     • EPINEPHrine (EPIPEN) 0.3 MG/0.3ML Solution Auto-injector solution for injection      • butalbital/apap/caffeine -40 mg (FIORICET) -40 MG Tab Take 1 Tab by mouth every four hours as needed for Headache. Per patient as needed     • asa/apap/caffeine (EXCEDRIN) 250-250-65 MG Tab Take 2 Tabs by mouth every 6 hours as needed for Headache (migraine).     • MAGNESIUM PO Take 1 Cap by mouth every day.     • Cholecalciferol (VITAMIN D3 PO) Take 1 Cap by mouth every day.     • VITAMIN E PO Take 1 Cap by mouth every day.     • multivitamin (THERAGRAN) Tab Take 1 Tab by mouth every day.         Social History     Socioeconomic History   • Marital status: Single     Spouse name: Not on file   • Number of children: Not on file   • Years of education: Not on file   • Highest education level: Not on file   Occupational History   • Not on file   Tobacco  Use   • Smoking status: Never Smoker   • Smokeless tobacco: Never Used   Vaping Use   • Vaping Use: Never used   Substance and Sexual Activity   • Alcohol use: Yes     Comment: occasional   • Drug use: No   • Sexual activity: Yes     Partners: Male   Other Topics Concern   •  Service No   • Blood Transfusions No   • Caffeine Concern No   • Occupational Exposure No   • Hobby Hazards No   • Sleep Concern No   • Stress Concern No   • Weight Concern No   • Special Diet No   • Back Care No   • Exercise Yes   • Bike Helmet Yes   • Seat Belt Yes   • Self-Exams Yes   Social History Narrative   • Not on file     Social Determinants of Health     Financial Resource Strain:    • Difficulty of Paying Living Expenses:    Food Insecurity:    • Worried About Running Out of Food in the Last Year:    • Ran Out of Food in the Last Year:    Transportation Needs:    • Lack of Transportation (Medical):    • Lack of Transportation (Non-Medical):    Physical Activity:    • Days of Exercise per Week:    • Minutes of Exercise per Session:    Stress:    • Feeling of Stress :    Social Connections:    • Frequency of Communication with Friends and Family:    • Frequency of Social Gatherings with Friends and Family:    • Attends [employer] Services:    • Active Member of Clubs or Organizations:    • Attends Club or Organization Meetings:    • Marital Status:    Intimate Partner Violence:    • Fear of Current or Ex-Partner:    • Emotionally Abused:    • Physically Abused:    • Sexually Abused:        Family History   Problem Relation Age of Onset   • Hypertension Mother    • No Known Problems Father        Allergies  Pcn [penicillins] and Clindamycin    Review of Systems  Comprehensive review of systems was negative       Physical Exam  Constitutional:       General: She is not in acute distress.     Appearance: Normal appearance. She is not ill-appearing, toxic-appearing or diaphoretic.   Cardiovascular:      Rate and Rhythm: Normal rate  and regular rhythm.      Pulses: Normal pulses.      Heart sounds: Normal heart sounds.   Pulmonary:      Effort: Pulmonary effort is normal.      Breath sounds: Normal breath sounds.   Abdominal:      General: Abdomen is flat. Bowel sounds are normal. There is no distension.      Palpations: Abdomen is soft.      Tenderness: There is no abdominal tenderness. There is no guarding or rebound.   Skin:     General: Skin is warm and dry.      Capillary Refill: Capillary refill takes less than 2 seconds.   Neurological:      Mental Status: She is alert.          Vital Signs                        Vitals reviewed    Labs:                    Radiology:  DX-PORTABLE FLUOROSCOPY < 1 HOUR Is the patient pregnant? Unknown    (Results Pending)         Assessment/Plan:  Pre-Op Diagnosis Codes:     * Cervical radiculopathy [M54.12]  Procedure(s):  C7-T1 interlaminar epidural steroid injection

## 2021-10-27 NOTE — PROGRESS NOTES
Pt received to pre procedure area. ID band and allergies verified. Vital signs taken and stable. Pertinent medical history (Asthma, GERD, Anxiety) reviewed and communicated to procedure RN. Verified that patient has not taken NSAIDS, anticoagulants or blood thinners in past 5 days. Reviewed post op instructions with patient, questions answered, verbalized understanding.     1600  Pt received to recovery area, report received from procedure RN Elke. Vitals taken and stable. Patient tolerated po fluids and snack without difficulty. Dressing clean, dry and intact. Pt declined ice pack.    1615  Pt seen by Dr. Ordonez, orders received for discharge. Patient ambulatory without difficulty. Pt discharged to designated .

## 2021-10-27 NOTE — PROGRESS NOTES
Preprocedure assessment completed.  Pertinent health information(healthy) communicated to physician and staff prior to time out.  Patient positioned preprocedure by RN, CSTand XRAY.  Foam wedge under ankles for support.

## 2021-10-27 NOTE — OP REPORT
Date of Service: 10/27/2021    Physician/s: Gilles Ordonez MD    Pre-operative Diagnosis: Cervical radiculopathy    Post-operative Diagnosis: Cervical radiculopathy    Procedure: C7-T1  Cervical interlaminar epidural steroid injection    Description of procedure:    The risks, benefits, and alternatives of the procedure were reviewed and discussed with the patient.  Written informed consent was freely obtained. A pre-procedural time-out was conducted by the physician verifying patient’s identity, procedure to be performed, procedure site and side, and allergy verification. Appropriate equipment was determined to be in place for the procedure.         The patient's vital signs were carefully monitored before, throughout, and after the procedure.     In the fluoroscopy suite the patient was placed in a prone position, a pillow placed underneath their chest. The skin was prepped and draped in the usual sterile fashion. The fluoroscope was placed over the cervical neck at the appropriate injection AP angle view, and the target for injection was marked. A 25g needle was placed into the marked site, and approx 2cc of 1% Lidocaine was injected subcutaneously into the epidermal and dermal layers. The needle was removed. A 20g Tuohy needle was then placed and advanced under fluoroscopic guidance into the LEFT C7-T1 interlaminar space at both the initial position AP view and contralateral oblique at a lateral view to ensure proper location of the needle tip at all times.  The needle was advanced with fluoroscopic guidance to the superior aspect of the T1 lamina.  Then a contralateral oblique view was used to advance the needle slightly towards the epidural space, A loss-of-resistance technique was used to guide the needle into the epidural space in a lateral fluoroscopic view and confirmed with loss of resistance with sterile normal saline. contrast dye was used to highlight the epidural space spread while the fluoroscope was  running live. Following negative aspiration, 1mL of 10mg/mL of dexamethasone followed by 2 mL of sterile saline . The needle was removed intact after restyleted. The patient's back was covered with a 4x4 gauze, the area was cleansed with sterile normal saline, and a dressing was applied. There were no complications noted.     The patient was then evaluated post-procedure, and was hemodynamically stable prior to leaving the post-operative care unit.     Follow-up as scheduled    Gilles Ordonez MD  Interventional Spine and Pain  Physical Medicine and Rehabilitation  [facility]        CPT  interlaminar cervical/thoracic epidural: 77125

## 2021-11-01 ENCOUNTER — TELEPHONE (OUTPATIENT)
Dept: PHYSICAL MEDICINE AND REHAB | Facility: MEDICAL CENTER | Age: 31
End: 2021-11-01

## 2021-11-01 NOTE — TELEPHONE ENCOUNTER
ELISABETHM to call us back in regards to her SP that was done with Dr. Harris dated 10/27/21 for her C7-T1 interlaminar epidural steroid injection.

## 2021-11-03 ENCOUNTER — TELEPHONE (OUTPATIENT)
Dept: PHYSICAL MEDICINE AND REHAB | Facility: MEDICAL CENTER | Age: 31
End: 2021-11-03

## 2021-11-03 DIAGNOSIS — G43.009 MIGRAINE WITHOUT AURA AND WITHOUT STATUS MIGRAINOSUS, NOT INTRACTABLE: ICD-10-CM

## 2021-11-03 DIAGNOSIS — M54.12 CERVICAL RADICULOPATHY: ICD-10-CM

## 2021-11-03 DIAGNOSIS — M50.20 CERVICAL DISC HERNIATION: ICD-10-CM

## 2021-11-03 RX ORDER — GABAPENTIN 300 MG/1
300 CAPSULE ORAL 3 TIMES DAILY PRN
Qty: 90 CAPSULE | Refills: 5 | Status: SHIPPED | OUTPATIENT
Start: 2021-11-03 | End: 2022-03-05

## 2021-11-03 RX ORDER — ACETAMINOPHEN 500 MG
1000 TABLET ORAL 3 TIMES DAILY PRN
Qty: 180 TABLET | Refills: 6 | Status: SHIPPED | OUTPATIENT
Start: 2021-11-03 | End: 2022-03-05

## 2021-11-03 NOTE — TELEPHONE ENCOUNTER
Reviewed the images and everything looks good on the imaging from the procedure.  Sometimes a steroid can trigger headaches which typically resolve on their own.  I also prescribed gabapentin which you can try on off days first.  Sometimes the gabapentin can cause some drowsiness but typically this will help.  I also recommend Tylenol 1000 mg up to 3 times daily.  Not to exceed 3000 mg of acetaminophen per day.  If things are not improving with the above management in the next week then lets follow-up in clinic.Dr. Ordonez

## 2021-11-03 NOTE — TELEPHONE ENCOUNTER
Pt called and she mentioned that since she had her SP done for her C7-T1 interlaminar epidural steroid injection dated 10/27/21 she is experiencing headaches.  If she is lying down its level 5 out of 10 but last 3 days the pain is getting worst.    Pt was asking if this is normal or what she need to do.    Please advise    Thank you  Ashley

## 2021-11-04 ENCOUNTER — OFFICE VISIT (OUTPATIENT)
Dept: PHYSICAL MEDICINE AND REHAB | Facility: MEDICAL CENTER | Age: 31
End: 2021-11-04
Payer: COMMERCIAL

## 2021-11-04 VITALS
TEMPERATURE: 98.3 F | OXYGEN SATURATION: 95 % | WEIGHT: 129.63 LBS | SYSTOLIC BLOOD PRESSURE: 118 MMHG | BODY MASS INDEX: 22.13 KG/M2 | DIASTOLIC BLOOD PRESSURE: 78 MMHG | HEIGHT: 64 IN | HEART RATE: 77 BPM

## 2021-11-04 DIAGNOSIS — G43.009 MIGRAINE WITHOUT AURA AND WITHOUT STATUS MIGRAINOSUS, NOT INTRACTABLE: ICD-10-CM

## 2021-11-04 DIAGNOSIS — M54.81 OCCIPITAL NEURALGIA OF LEFT SIDE: ICD-10-CM

## 2021-11-04 DIAGNOSIS — M50.20 CERVICAL DISC HERNIATION: ICD-10-CM

## 2021-11-04 DIAGNOSIS — M54.12 CERVICAL RADICULOPATHY: ICD-10-CM

## 2021-11-04 PROCEDURE — 64405 NJX AA&/STRD GR OCPL NRV: CPT | Mod: LT | Performed by: PHYSICAL MEDICINE & REHABILITATION

## 2021-11-04 PROCEDURE — 99214 OFFICE O/P EST MOD 30 MIN: CPT | Mod: 25 | Performed by: PHYSICAL MEDICINE & REHABILITATION

## 2021-11-04 ASSESSMENT — PAIN SCALES - GENERAL: PAINLEVEL: 4=SLIGHT-MODERATE PAIN

## 2021-11-04 ASSESSMENT — PATIENT HEALTH QUESTIONNAIRE - PHQ9: CLINICAL INTERPRETATION OF PHQ2 SCORE: 0

## 2021-11-04 ASSESSMENT — FIBROSIS 4 INDEX: FIB4 SCORE: 0.86

## 2021-11-04 NOTE — PROCEDURES
Patient: Belgica Cesar 30 y.o.     Date of Service: 11/4/2021    Physician/s: Gilles Ordonez MD    Pre-operative Diagnosis: left GREATER occipital neurlagia, Chronic headaches    Post-operative Diagnosis: left GREATER occipital neurlagia, Chronic headaches    Procedure: left  GREATER occipital nerve injection ultrasound-guided    Description of procedure:    The risks, benefits, and alternatives of the procedure were reviewed and discussed with the patient.  Written informed consent was freely obtained. A pre-procedural time-out was conducted by the physician verifying patient’s identity, procedure to be performed, procedure site and side, and allergy verification. Appropriate equipment was determined to be in place for the procedure.     No sedation was used for this procedure.     A solution was prepared with 2 mL of 2% lidocaine      In the office suite the patient was placed in a prone position, and his/her forehead was rested on the table in flexion. The occipital ridge and the bony area where the left occipital nerve are expected were palpated for the areas of maximal pain. Following negative aspiration, 2 mL of 2% lidocaine was injected perineurally. The needle was subsequently removed. The patient's skin was wiped with a 4x4 gauze, the area was cleansed with alcohol prep, and a bandaid was applied. There were no complications noted.     There was 100% pain relief of the occipital headache post procedure    Gilles Ordonez MD  Interventional Spine and Pain  Physical Medicine and Rehabilitation  [facility]

## 2021-11-04 NOTE — PROGRESS NOTES
Follow up patient note  Interventional spine and sports physiatry, Physical medicine rehabilitation      Chief complaint:   Chief Complaint   Patient presents with   • Follow-Up     Neck pain         HISTORY    Please see new patient note by Dr Ordonez,  for more details.     HPI  Patient identification: Belgica Cesar  1990,   female  With Diagnoses of Occipital neuralgia of left side, Cervical disc herniation, Cervical radiculopathy, and Migraine without aura and without status migrainosus, not intractable were pertinent to this visit.   Procedures:  2019: C7-T1 interlaminar epidural steroid injection with significant improvement post procedure especially in the radicular component   10/27/2021 C7-T1 interlaminar epidural steroid injection  2021 left occipital nerve block with 100% pain relief during the diagnostic phase    The patient was having worsening of pain mostly with her headaches. The patient has chronic headaches and diagnosed with migraines. She has tried multiple different medications for the migraines. She is considering botulinum toxin injections and other novel treatments however she is considering getting pregnant in about 1 year so she does not want to try any treatments for this at this time. The left side of her neck pain continues to be painful after the epidural steroid injection and her headaches have flared. These are intermittent moderate to severe in intensity. There is no radiating pain down the arm. The patient is having a hard time going to work and doing her ADLs because of the headaches and the pain. The worst part of her headache pain is starting at the left occiput and radiating towards the left ear.    Medications tried include Excedrin, ibuprofen, Fioricet, sumatriptan       ROS Red Flags :   Fever, Chills, Sweats: Denies  Involuntary Weight Loss: Denies  Bowel/Bladder Incontinence: Denies  Saddle Anesthesia: Denies        PMHx:   Past Medical History:    Diagnosis Date   • Acute kidney injury (HCC) 2/11/2020   • Anxiety    • Asthma    • Bulimia nervosa in full remission 5/27/2021   • Migraines        PSHx:   Past Surgical History:   Procedure Laterality Date   • SC INJ CERV/THORAC,W/ IMAGING N/A 10/27/2021    Procedure: C7-T1 interlaminar epidural steroid injection;  Surgeon: Gilles Ordonez M.D.;  [facility];  Service: Pain Management       Family history   Family History   Problem Relation Age of Onset   • Hypertension Mother    • No Known Problems Father          Medications:   Outpatient Medications Marked as Taking for the 11/4/21 encounter (Office Visit) with Gilles Ordonez M.D.   Medication Sig Dispense Refill   • gabapentin (NEURONTIN) 300 MG Cap Take 1 Capsule by mouth 3 times a day as needed (pain). 90 Capsule 5   • acetaminophen (TYLENOL) 500 MG Tab Take 2 Tablets by mouth 3 times a day as needed (pain.  Do not exceed 3000 mg of total acetaminophen per day). 180 Tablet 6   • Pantoprazole Sodium (PROTONIX PO) Take 20 mg by mouth every day.     • rizatriptan (MAXALT-MLT) 10 MG disintegrating tablet Take 1 tablet by mouth 1 time a day as needed for Migraine. 9 tablet 1   • ESOMEPRAZOLE MAGNESIUM PO Take  by mouth.     • Coenzyme Q10 (CO Q 10 PO) Take  by mouth.     • EPINEPHrine (EPIPEN) 0.3 MG/0.3ML Solution Auto-injector solution for injection      • butalbital/apap/caffeine -40 mg (FIORICET) -40 MG Tab Take 1 Tab by mouth every four hours as needed for Headache. Per patient as needed     • asa/apap/caffeine (EXCEDRIN) 250-250-65 MG Tab Take 2 Tabs by mouth every 6 hours as needed for Headache (migraine).     • MAGNESIUM PO Take 1 Cap by mouth every day.     • Cholecalciferol (VITAMIN D3 PO) Take 1 Cap by mouth every day.     • VITAMIN E PO Take 1 Cap by mouth every day.     • multivitamin (THERAGRAN) Tab Take 1 Tab by mouth every day.          Current Outpatient Medications on File Prior to Visit   Medication  Sig Dispense Refill   • gabapentin (NEURONTIN) 300 MG Cap Take 1 Capsule by mouth 3 times a day as needed (pain). 90 Capsule 5   • acetaminophen (TYLENOL) 500 MG Tab Take 2 Tablets by mouth 3 times a day as needed (pain.  Do not exceed 3000 mg of total acetaminophen per day). 180 Tablet 6   • Pantoprazole Sodium (PROTONIX PO) Take 20 mg by mouth every day.     • rizatriptan (MAXALT-MLT) 10 MG disintegrating tablet Take 1 tablet by mouth 1 time a day as needed for Migraine. 9 tablet 1   • ESOMEPRAZOLE MAGNESIUM PO Take  by mouth.     • Coenzyme Q10 (CO Q 10 PO) Take  by mouth.     • EPINEPHrine (EPIPEN) 0.3 MG/0.3ML Solution Auto-injector solution for injection      • butalbital/apap/caffeine -40 mg (FIORICET) -40 MG Tab Take 1 Tab by mouth every four hours as needed for Headache. Per patient as needed     • asa/apap/caffeine (EXCEDRIN) 250-250-65 MG Tab Take 2 Tabs by mouth every 6 hours as needed for Headache (migraine).     • MAGNESIUM PO Take 1 Cap by mouth every day.     • Cholecalciferol (VITAMIN D3 PO) Take 1 Cap by mouth every day.     • VITAMIN E PO Take 1 Cap by mouth every day.     • multivitamin (THERAGRAN) Tab Take 1 Tab by mouth every day.       No current facility-administered medications on file prior to visit.         Allergies:   Allergies   Allergen Reactions   • Pcn [Penicillins] Rash     Pt reports that she gets a full body rash   • Clindamycin Rash     Pt reports that she gets a full body rash       Social Hx:   Social History     Socioeconomic History   • Marital status: Single     Spouse name: Not on file   • Number of children: Not on file   • Years of education: Not on file   • Highest education level: Not on file   Occupational History   • Not on file   Tobacco Use   • Smoking status: Never Smoker   • Smokeless tobacco: Never Used   Vaping Use   • Vaping Use: Never used   Substance and Sexual Activity   • Alcohol use: Yes     Comment: occasional   • Drug use: No   • Sexual  "activity: Yes     Partners: Male   Other Topics Concern   •  Service No   • Blood Transfusions No   • Caffeine Concern No   • Occupational Exposure No   • Hobby Hazards No   • Sleep Concern No   • Stress Concern No   • Weight Concern No   • Special Diet No   • Back Care No   • Exercise Yes   • Bike Helmet Yes   • Seat Belt Yes   • Self-Exams Yes   Social History Narrative   • Not on file     Social Determinants of Health     Financial Resource Strain:    • Difficulty of Paying Living Expenses:    Food Insecurity:    • Worried About Running Out of Food in the Last Year:    • Ran Out of Food in the Last Year:    Transportation Needs:    • Lack of Transportation (Medical):    • Lack of Transportation (Non-Medical):    Physical Activity:    • Days of Exercise per Week:    • Minutes of Exercise per Session:    Stress:    • Feeling of Stress :    Social Connections:    • Frequency of Communication with Friends and Family:    • Frequency of Social Gatherings with Friends and Family:    • Attends [employer]ae[employer] Services:    • Active Member of Clubs or Organizations:    • Attends Club or Organization Meetings:    • Marital Status:    Intimate Partner Violence:    • Fear of Current or Ex-Partner:    • Emotionally Abused:    • Physically Abused:    • Sexually Abused:          EXAMINATION     Physical Exam:   Vitals: /78 (BP Location: Right arm, Patient Position: Sitting, BP Cuff Size: Adult)   Pulse 77   Temp 36.8 °C (98.3 °F) (Temporal)   Ht 1.626 m (5' 4\")   Wt 58.8 kg (129 lb 10.1 oz)   SpO2 95%     Constitutional:   Body Habitus: Body mass index is 22.25 kg/m².  Cooperation: Fully cooperates with exam  Appearance: Well-groomed no disheveled    Respiratory-  breathing comfortable on room air, no audible wheezing  Cardiovascular- capillary refills less than 2 seconds. No lower extremity edema is noted.   Psychiatric- alert and oriented ×3. Normal affect.    MSK/NEURO: -     CN  Cranial Nerves:     II - PERRL   "   III, IV, VI - EOMI     V - Equal sensation bilateral face     VII - Face and smile symmetric     VIII - Hearing normal to finger rubbing bilaterally     IX - Gag not tested     X - Uvula midline     XI - Shoulder shrugs equal     XII - Tongue protrusion midline, normal control    Cervical spine  There are no signs of infection around the injection sites.     full  active range of motion with flexion, lateral flexion, and rotation bilaterally.   There is full  active range of motion with cervical extension.      Palpation:   Tenderness to palpation throughout the cervical spine: negative bilaterally    Positive for tenderness to palpation over the left greater occipital nerve which reproduces the patient's pain    Cervical spine Special tests  Neuro tension  Spurling's maneuver negative bilaterally           Key points for the international standards for neurological classification of spinal cord injury (ISNCSCI) to light touch.     Dermatome R L   C4 2 2   C5 2 2   C6 2 2   C7 2 2   C8 2 2   T1 2 2   T2 2 2                                         Motor Exam Upper Extremities   ? Myotome R L   Shoulder flexion C5 5 5   Elbow flexion C5 5 5   Wrist extension C6 5 5   Elbow extension C7 5 5   Finger flexion C8 5 5   Finger abduction T1 5 5             MEDICAL DECISION MAKING    DATA    Labs:   Lab Results   Component Value Date/Time    SODIUM 138 12/28/2020 02:49 PM    POTASSIUM 3.5 (L) 12/28/2020 02:49 PM    CHLORIDE 102 12/28/2020 02:49 PM    CO2 29 12/28/2020 02:49 PM    GLUCOSE 121 (H) 12/28/2020 02:49 PM    BUN 14 12/28/2020 02:49 PM    CREATININE 0.79 12/28/2020 02:49 PM        No results found for: PROTHROMBTM, INR     Lab Results   Component Value Date/Time    WBC 13.4 (H) 02/11/2020 03:00 PM    RBC 4.32 02/11/2020 03:00 PM    HEMOGLOBIN 13.5 02/11/2020 03:00 PM    HEMATOCRIT 40.6 02/11/2020 03:00 PM    MCV 94.0 02/11/2020 03:00 PM    MCH 31.3 02/11/2020 03:00 PM    MCHC 33.3 (L) 02/11/2020 03:00 PM    MPV  12.0 2020 03:00 PM    NEUTSPOLYS 54.20 10/11/2017 07:57 PM    LYMPHOCYTES 34.40 10/11/2017 07:57 PM    MONOCYTES 8.30 10/11/2017 07:57 PM    EOSINOPHILS 2.40 10/11/2017 07:57 PM    BASOPHILS 0.50 10/11/2017 07:57 PM        No results found for: HBA1C       Imaging:   I personally reviewed following images  MRI cervical spine 10/14/119  At C5-6 there is a disc herniation in the left paracentric region with neuroforaminal stenosis on the left side C5-6.  No significant central canal stenosis.    I reviewed the following radiology reports                                 Results for orders placed during the hospital encounter of 10/14/19   MR-CERVICAL SPINE-W/O    Impression 1. Disc desiccation at C5-6 with annular fissure and posterior broad-based disc protrusion along the left lateral recess and left neuroforamen. Mild narrowing of the left lateral recess and left neuroforamen. No spinal canal narrowing. The left C6 nerve   root is likely impinged.    2. Unremarkable appearance at other levels.                                                                Results for orders placed in visit on 19   DX-CERVICAL SPINE-2 OR 3 VIEWS    Impression Normal cervical spine series.                                                                                  Results for orders placed during the hospital encounter of 18   DX-WRIST-COMPLETE 3+ LEFT    Impression No acute osseous abnormality.           DIAGNOSIS   Visit Diagnoses     ICD-10-CM   1. Occipital neuralgia of left side  M54.81   2. Cervical disc herniation  M50.20   3. Cervical radiculopathy  M54.12   4. Migraine without aura and without status migrainosus, not intractable  G43.009         ASSESSMENT and PLAN:     Belgica Arsenio  1990,   female      Belgica was seen today for follow-up.    Diagnoses and all orders for this visit:    Occipital neuralgia of left side    Cervical disc herniation    Cervical radiculopathy    Migraine without  aura and without status migrainosus, not intractable        We're still early after cervical epidural steroid injection. Is possible the patient had a steroid flare and that this will subside on its own.    She is having a separate issue with occipital neuralgia on the left side reproducible on exam and with the distribution of pain. Given the severity of pain and functional deficits we decided to proceed today with a left greater occipital nerve block    For her migraines I advised the patient to discuss other options including CGRP medications, botulinum toxin injections with her neurologist as well as with her OB/GYN to see which treatments could be tried for her.     I also discussed the case with the patient's inés Whittaker    Follow up: 11/16/2021     Thank you for allowing me to participate in the care of this patient. If you have any questions please not hesitate to contact me.            Please note that this dictation was created using voice recognition software. I have made every reasonable attempt to correct obvious errors but there may be errors of grammar and content that I may have overlooked prior to finalization of this note.      Gilles Ordonez MD  Interventional Spine and Sports Physiatry  Physical Medicine and Rehabilitation  [facility]

## 2021-11-16 ENCOUNTER — OFFICE VISIT (OUTPATIENT)
Dept: PHYSICAL MEDICINE AND REHAB | Facility: MEDICAL CENTER | Age: 31
End: 2021-11-16
Payer: COMMERCIAL

## 2021-11-16 VITALS
DIASTOLIC BLOOD PRESSURE: 72 MMHG | BODY MASS INDEX: 22.51 KG/M2 | SYSTOLIC BLOOD PRESSURE: 114 MMHG | WEIGHT: 131.84 LBS | TEMPERATURE: 97.9 F | HEART RATE: 71 BPM | OXYGEN SATURATION: 96 % | HEIGHT: 64 IN

## 2021-11-16 DIAGNOSIS — M54.81 OCCIPITAL NEURALGIA OF LEFT SIDE: ICD-10-CM

## 2021-11-16 DIAGNOSIS — M50.20 CERVICAL DISC HERNIATION: ICD-10-CM

## 2021-11-16 DIAGNOSIS — G43.009 MIGRAINE WITHOUT AURA AND WITHOUT STATUS MIGRAINOSUS, NOT INTRACTABLE: ICD-10-CM

## 2021-11-16 DIAGNOSIS — M54.12 CERVICAL RADICULOPATHY: ICD-10-CM

## 2021-11-16 PROCEDURE — 99214 OFFICE O/P EST MOD 30 MIN: CPT | Performed by: PHYSICAL MEDICINE & REHABILITATION

## 2021-11-16 ASSESSMENT — PATIENT HEALTH QUESTIONNAIRE - PHQ9: CLINICAL INTERPRETATION OF PHQ2 SCORE: 0

## 2021-11-16 ASSESSMENT — FIBROSIS 4 INDEX: FIB4 SCORE: 0.86

## 2021-11-16 ASSESSMENT — PAIN SCALES - GENERAL: PAINLEVEL: 3=SLIGHT PAIN

## 2021-11-16 NOTE — PROGRESS NOTES
Follow up patient note  Interventional spine and sports physiatry, Physical medicine rehabilitation      Chief complaint:   Chief Complaint   Patient presents with   • Follow-Up     neck pain         HISTORY    Please see new patient note by Dr Ordonez,  for more details.     HPI  Patient identification: Belgica Cesar  1990,   female  With Diagnoses of Occipital neuralgia of left side, Cervical disc herniation, Cervical radiculopathy, and Migraine without aura and without status migrainosus, not intractable were pertinent to this visit.   Procedures:  2019: C7-T1 interlaminar epidural steroid injection with significant improvement post procedure especially in the radicular component   10/27/2021 C7-T1 interlaminar epidural steroid injection  2021 left occipital nerve block with 100% pain relief post procedure.    The patient notes a dramatic improvement in headaches after the occipital nerve block.  Radiating pains down the arm have improved.  The severe cervical neck pain is also improved.  She does have a pain which she describes as 3 out of 10 intensity in the left side of the neck and periscapular pain.  Intermittent pain.  This is significantly improved.    Medications tried include Excedrin, ibuprofen, Fioricet, sumatriptan       ROS Red Flags :   Fever, Chills, Sweats: Denies  Involuntary Weight Loss: Denies  Bowel/Bladder Incontinence: Denies  Saddle Anesthesia: Denies        PMHx:   Past Medical History:   Diagnosis Date   • Acute kidney injury (HCC) 2020   • Anxiety    • Asthma    • Bulimia nervosa in full remission 2021   • Migraines        PSHx:   Past Surgical History:   Procedure Laterality Date   • ID INJ CERV/THORAC,W/ IMAGING N/A 10/27/2021    Procedure: C7-T1 interlaminar epidural steroid injection;  Surgeon: Gilles Ordonez M.D.;  [facility];  Service: Pain Management       Family history   Family History   Problem Relation Age of Onset    • Hypertension Mother    • No Known Problems Father          Medications:   Outpatient Medications Marked as Taking for the 11/16/21 encounter (Office Visit) with Gilles Ordonez M.D.   Medication Sig Dispense Refill   • acetaminophen (TYLENOL) 500 MG Tab Take 2 Tablets by mouth 3 times a day as needed (pain.  Do not exceed 3000 mg of total acetaminophen per day). 180 Tablet 6   • Pantoprazole Sodium (PROTONIX PO) Take 20 mg by mouth every day.     • rizatriptan (MAXALT-MLT) 10 MG disintegrating tablet Take 1 tablet by mouth 1 time a day as needed for Migraine. 9 tablet 1   • ESOMEPRAZOLE MAGNESIUM PO Take  by mouth.     • Coenzyme Q10 (CO Q 10 PO) Take  by mouth.     • EPINEPHrine (EPIPEN) 0.3 MG/0.3ML Solution Auto-injector solution for injection      • butalbital/apap/caffeine -40 mg (FIORICET) -40 MG Tab Take 1 Tab by mouth every four hours as needed for Headache. Per patient as needed     • asa/apap/caffeine (EXCEDRIN) 250-250-65 MG Tab Take 2 Tabs by mouth every 6 hours as needed for Headache (migraine).     • MAGNESIUM PO Take 1 Cap by mouth every day.     • Cholecalciferol (VITAMIN D3 PO) Take 1 Cap by mouth every day.     • VITAMIN E PO Take 1 Cap by mouth every day.     • multivitamin (THERAGRAN) Tab Take 1 Tab by mouth every day.          Current Outpatient Medications on File Prior to Visit   Medication Sig Dispense Refill   • acetaminophen (TYLENOL) 500 MG Tab Take 2 Tablets by mouth 3 times a day as needed (pain.  Do not exceed 3000 mg of total acetaminophen per day). 180 Tablet 6   • Pantoprazole Sodium (PROTONIX PO) Take 20 mg by mouth every day.     • rizatriptan (MAXALT-MLT) 10 MG disintegrating tablet Take 1 tablet by mouth 1 time a day as needed for Migraine. 9 tablet 1   • ESOMEPRAZOLE MAGNESIUM PO Take  by mouth.     • Coenzyme Q10 (CO Q 10 PO) Take  by mouth.     • EPINEPHrine (EPIPEN) 0.3 MG/0.3ML Solution Auto-injector solution for injection      • butalbital/apap/caffeine  -40 mg (FIORICET) -40 MG Tab Take 1 Tab by mouth every four hours as needed for Headache. Per patient as needed     • asa/apap/caffeine (EXCEDRIN) 250-250-65 MG Tab Take 2 Tabs by mouth every 6 hours as needed for Headache (migraine).     • MAGNESIUM PO Take 1 Cap by mouth every day.     • Cholecalciferol (VITAMIN D3 PO) Take 1 Cap by mouth every day.     • VITAMIN E PO Take 1 Cap by mouth every day.     • multivitamin (THERAGRAN) Tab Take 1 Tab by mouth every day.     • gabapentin (NEURONTIN) 300 MG Cap Take 1 Capsule by mouth 3 times a day as needed (pain). (Patient not taking: Reported on 11/16/2021) 90 Capsule 5     No current facility-administered medications on file prior to visit.         Allergies:   Allergies   Allergen Reactions   • Pcn [Penicillins] Rash     Pt reports that she gets a full body rash   • Clindamycin Rash     Pt reports that she gets a full body rash       Social Hx:   Social History     Socioeconomic History   • Marital status: Single     Spouse name: Not on file   • Number of children: Not on file   • Years of education: Not on file   • Highest education level: Not on file   Occupational History   • Not on file   Tobacco Use   • Smoking status: Never Smoker   • Smokeless tobacco: Never Used   Vaping Use   • Vaping Use: Never used   Substance and Sexual Activity   • Alcohol use: Yes     Comment: occasional   • Drug use: No   • Sexual activity: Yes     Partners: Male   Other Topics Concern   •  Service No   • Blood Transfusions No   • Caffeine Concern No   • Occupational Exposure No   • Hobby Hazards No   • Sleep Concern No   • Stress Concern No   • Weight Concern No   • Special Diet No   • Back Care No   • Exercise Yes   • Bike Helmet Yes   • Seat Belt Yes   • Self-Exams Yes   Social History Narrative   • Not on file     Social Determinants of Health     Financial Resource Strain:    • Difficulty of Paying Living Expenses: Not on file   Food Insecurity:    • Worried  "About Running Out of Food in the Last Year: Not on file   • Ran Out of Food in the Last Year: Not on file   Transportation Needs:    • Lack of Transportation (Medical): Not on file   • Lack of Transportation (Non-Medical): Not on file   Physical Activity:    • Days of Exercise per Week: Not on file   • Minutes of Exercise per Session: Not on file   Stress:    • Feeling of Stress : Not on file   Social Connections:    • Frequency of Communication with Friends and Family: Not on file   • Frequency of Social Gatherings with Friends and Family: Not on file   • Attends Mandaeism Services: Not on file   • Active Member of Clubs or Organizations: Not on file   • Attends Club or Organization Meetings: Not on file   • Marital Status: Not on file   Intimate Partner Violence:    • Fear of Current or Ex-Partner: Not on file   • Emotionally Abused: Not on file   • Physically Abused: Not on file   • Sexually Abused: Not on file   Housing Stability:    • Unable to Pay for Housing in the Last Year: Not on file   • Number of Places Lived in the Last Year: Not on file   • Unstable Housing in the Last Year: Not on file         EXAMINATION     Physical Exam:   Vitals: /72 (BP Location: Right arm, Patient Position: Sitting, BP Cuff Size: Adult)   Pulse 71   Temp 36.6 °C (97.9 °F) (Temporal)   Ht 1.626 m (5' 4\")   Wt 59.8 kg (131 lb 13.4 oz)   SpO2 96%     Constitutional:   Body Habitus: Body mass index is 22.63 kg/m².  Cooperation: Fully cooperates with exam  Appearance: Well-groomed no disheveled    Respiratory-  breathing comfortable on room air, no audible wheezing  Cardiovascular- capillary refills less than 2 seconds. No lower extremity edema is noted.   Psychiatric- alert and oriented ×3. Normal affect.    MSK/NEURO: -   CN  Cranial Nerves:     II - PERRL     III, IV, VI - EOMI     V - Equal sensation bilateral face     VII - Face and smile symmetric     VIII - Hearing normal to finger rubbing bilaterally     IX - Gag " not tested     X - Uvula midline     XI - Shoulder shrugs equal     XII - Tongue protrusion midline, normal control     Cervical spine  There are no signs of infection around the injection sites.     full  active range of motion with flexion, lateral flexion, and rotation bilaterally.   There is full  active range of motion with cervical extension.      Palpation:   Tenderness to palpation throughout the cervical spine: negative bilaterally        Cervical spine Special tests  Neuro tension  Spurling's maneuver negative bilaterally           Key points for the international standards for neurological classification of spinal cord injury (ISNCSCI) to light touch.     Dermatome R L   C4 2 2   C5 2 2   C6 2 2   C7 2 2   C8 2 2   T1 2 2   T2 2 2                                         Motor Exam Upper Extremities   ? Myotome R L   Shoulder flexion C5 5 5   Elbow flexion C5 5 5   Wrist extension C6 5 5   Elbow extension C7 5 5   Finger flexion C8 5 5   Finger abduction T1 5 5           MEDICAL DECISION MAKING    DATA    Labs:   Lab Results   Component Value Date/Time    SODIUM 138 12/28/2020 02:49 PM    POTASSIUM 3.5 (L) 12/28/2020 02:49 PM    CHLORIDE 102 12/28/2020 02:49 PM    CO2 29 12/28/2020 02:49 PM    GLUCOSE 121 (H) 12/28/2020 02:49 PM    BUN 14 12/28/2020 02:49 PM    CREATININE 0.79 12/28/2020 02:49 PM        No results found for: PROTHROMBTM, INR     Lab Results   Component Value Date/Time    WBC 13.4 (H) 02/11/2020 03:00 PM    RBC 4.32 02/11/2020 03:00 PM    HEMOGLOBIN 13.5 02/11/2020 03:00 PM    HEMATOCRIT 40.6 02/11/2020 03:00 PM    MCV 94.0 02/11/2020 03:00 PM    MCH 31.3 02/11/2020 03:00 PM    MCHC 33.3 (L) 02/11/2020 03:00 PM    MPV 12.0 02/11/2020 03:00 PM    NEUTSPOLYS 54.20 10/11/2017 07:57 PM    LYMPHOCYTES 34.40 10/11/2017 07:57 PM    MONOCYTES 8.30 10/11/2017 07:57 PM    EOSINOPHILS 2.40 10/11/2017 07:57 PM    BASOPHILS 0.50 10/11/2017 07:57 PM        No results found for: HBA1C       Imaging:   I  personally reviewed following images  MRI cervical spine 10/14/119  At C5-6 there is a disc herniation in the left paracentric region with neuroforaminal stenosis on the left side C5-6.  No significant central canal stenosis.    I reviewed the following radiology reports                                 Results for orders placed during the hospital encounter of 10/14/19   MR-CERVICAL SPINE-W/O    Impression 1. Disc desiccation at C5-6 with annular fissure and posterior broad-based disc protrusion along the left lateral recess and left neuroforamen. Mild narrowing of the left lateral recess and left neuroforamen. No spinal canal narrowing. The left C6 nerve   root is likely impinged.    2. Unremarkable appearance at other levels.                                                                Results for orders placed in visit on 19   DX-CERVICAL SPINE-2 OR 3 VIEWS    Impression Normal cervical spine series.                                                                                  Results for orders placed during the hospital encounter of 18   DX-WRIST-COMPLETE 3+ LEFT    Impression No acute osseous abnormality.           DIAGNOSIS   Visit Diagnoses     ICD-10-CM   1. Occipital neuralgia of left side  M54.81   2. Cervical disc herniation  M50.20   3. Cervical radiculopathy  M54.12   4. Migraine without aura and without status migrainosus, not intractable  G43.009         ASSESSMENT and PLAN:     Belgica Cesar  1990,   female      Belgica was seen today for follow-up.    Diagnoses and all orders for this visit:    Occipital neuralgia of left side    Cervical disc herniation    Cervical radiculopathy    Migraine without aura and without status migrainosus, not intractable  Comments:  followed by [facility]        The above issues are now stable.  I advised the patient to continue her home exercise program.    The occipital nerve block had excellent abortive qualities for the patient and  she continues to get pain relief from her headaches from this.  This could be repeated in the future if this headache does return.    I also advised the patient to discuss with her neurologist as well as her OB/GYN about which medications could be used for headaches during pregnancy.  If the patient is not actively trying to get pregnant currently then she can use gabapentin 300 mg 3 times daily as needed pain    Follow up: 1 year or as needed    Thank you for allowing me to participate in the care of this patient. If you have any questions please not hesitate to contact me.            Please note that this dictation was created using voice recognition software. I have made every reasonable attempt to correct obvious errors but there may be errors of grammar and content that I may have overlooked prior to finalization of this note.      Gilles Ordonez MD  Interventional Spine and Sports Physiatry  Physical Medicine and Rehabilitation  [facility]

## 2022-01-24 ENCOUNTER — EH NON-PROVIDER (OUTPATIENT)
Dept: OCCUPATIONAL MEDICINE | Facility: CLINIC | Age: 32
End: 2022-01-24
Payer: COMMERCIAL

## 2022-01-24 PROCEDURE — 94375 RESPIRATORY FLOW VOLUME LOOP: CPT | Performed by: PREVENTIVE MEDICINE

## 2022-03-05 ENCOUNTER — HOSPITAL ENCOUNTER (EMERGENCY)
Facility: MEDICAL CENTER | Age: 32
End: 2022-03-05
Attending: EMERGENCY MEDICINE
Payer: COMMERCIAL

## 2022-03-05 ENCOUNTER — APPOINTMENT (OUTPATIENT)
Dept: RADIOLOGY | Facility: MEDICAL CENTER | Age: 32
End: 2022-03-05
Attending: EMERGENCY MEDICINE
Payer: COMMERCIAL

## 2022-03-05 VITALS
HEIGHT: 64 IN | HEART RATE: 75 BPM | WEIGHT: 123.68 LBS | RESPIRATION RATE: 13 BRPM | OXYGEN SATURATION: 93 % | DIASTOLIC BLOOD PRESSURE: 55 MMHG | TEMPERATURE: 97 F | SYSTOLIC BLOOD PRESSURE: 108 MMHG | BODY MASS INDEX: 21.11 KG/M2

## 2022-03-05 DIAGNOSIS — R19.7 NAUSEA VOMITING AND DIARRHEA: ICD-10-CM

## 2022-03-05 DIAGNOSIS — R10.31 RLQ ABDOMINAL PAIN: ICD-10-CM

## 2022-03-05 DIAGNOSIS — R10.84 GENERALIZED ABDOMINAL PAIN: ICD-10-CM

## 2022-03-05 DIAGNOSIS — R11.2 NAUSEA VOMITING AND DIARRHEA: ICD-10-CM

## 2022-03-05 LAB
ALBUMIN SERPL BCP-MCNC: 5.1 G/DL (ref 3.2–4.9)
ALBUMIN/GLOB SERPL: 1.8 G/DL
ALP SERPL-CCNC: 94 U/L (ref 30–99)
ALT SERPL-CCNC: 15 U/L (ref 2–50)
ANION GAP SERPL CALC-SCNC: 17 MMOL/L (ref 7–16)
APPEARANCE UR: ABNORMAL
AST SERPL-CCNC: 23 U/L (ref 12–45)
BACTERIA #/AREA URNS HPF: ABNORMAL /HPF
BASOPHILS # BLD AUTO: 0.3 % (ref 0–1.8)
BASOPHILS # BLD: 0.04 K/UL (ref 0–0.12)
BILIRUB SERPL-MCNC: 1.4 MG/DL (ref 0.1–1.5)
BILIRUB UR QL STRIP.AUTO: ABNORMAL
BUN SERPL-MCNC: 22 MG/DL (ref 8–22)
CALCIUM SERPL-MCNC: 10.2 MG/DL (ref 8.5–10.5)
CHLORIDE SERPL-SCNC: 101 MMOL/L (ref 96–112)
CO2 SERPL-SCNC: 20 MMOL/L (ref 20–33)
COLOR UR: ABNORMAL
CREAT SERPL-MCNC: 0.87 MG/DL (ref 0.5–1.4)
EKG IMPRESSION: NORMAL
EOSINOPHIL # BLD AUTO: 0.09 K/UL (ref 0–0.51)
EOSINOPHIL NFR BLD: 0.6 % (ref 0–6.9)
EPI CELLS #/AREA URNS HPF: ABNORMAL /HPF
ERYTHROCYTE [DISTWIDTH] IN BLOOD BY AUTOMATED COUNT: 40.1 FL (ref 35.9–50)
GLOBULIN SER CALC-MCNC: 2.8 G/DL (ref 1.9–3.5)
GLUCOSE SERPL-MCNC: 110 MG/DL (ref 65–99)
GLUCOSE UR STRIP.AUTO-MCNC: NEGATIVE MG/DL
HCG SERPL QL: NEGATIVE
HCT VFR BLD AUTO: 48.3 % (ref 37–47)
HGB BLD-MCNC: 16.8 G/DL (ref 12–16)
HYALINE CASTS #/AREA URNS LPF: ABNORMAL /LPF
IMM GRANULOCYTES # BLD AUTO: 0.07 K/UL (ref 0–0.11)
IMM GRANULOCYTES NFR BLD AUTO: 0.5 % (ref 0–0.9)
KETONES UR STRIP.AUTO-MCNC: >=160 MG/DL
LEUKOCYTE ESTERASE UR QL STRIP.AUTO: ABNORMAL
LIPASE SERPL-CCNC: 17 U/L (ref 11–82)
LYMPHOCYTES # BLD AUTO: 0.5 K/UL (ref 1–4.8)
LYMPHOCYTES NFR BLD: 3.2 % (ref 22–41)
MCH RBC QN AUTO: 30.7 PG (ref 27–33)
MCHC RBC AUTO-ENTMCNC: 34.8 G/DL (ref 33.6–35)
MCV RBC AUTO: 88.1 FL (ref 81.4–97.8)
MICRO URNS: ABNORMAL
MONOCYTES # BLD AUTO: 0.69 K/UL (ref 0–0.85)
MONOCYTES NFR BLD AUTO: 4.4 % (ref 0–13.4)
NEUTROPHILS # BLD AUTO: 14.12 K/UL (ref 2–7.15)
NEUTROPHILS NFR BLD: 91 % (ref 44–72)
NITRITE UR QL STRIP.AUTO: NEGATIVE
NRBC # BLD AUTO: 0 K/UL
NRBC BLD-RTO: 0 /100 WBC
PH UR STRIP.AUTO: 5.5 [PH] (ref 5–8)
PLATELET # BLD AUTO: 236 K/UL (ref 164–446)
PMV BLD AUTO: 11.8 FL (ref 9–12.9)
POTASSIUM SERPL-SCNC: 4.1 MMOL/L (ref 3.6–5.5)
PROT SERPL-MCNC: 7.9 G/DL (ref 6–8.2)
PROT UR QL STRIP: 30 MG/DL
RBC # BLD AUTO: 5.48 M/UL (ref 4.2–5.4)
RBC # URNS HPF: ABNORMAL /HPF
RBC UR QL AUTO: ABNORMAL
SODIUM SERPL-SCNC: 138 MMOL/L (ref 135–145)
SP GR UR STRIP.AUTO: 1.03
UROBILINOGEN UR STRIP.AUTO-MCNC: 1 MG/DL
WBC # BLD AUTO: 15.5 K/UL (ref 4.8–10.8)
WBC #/AREA URNS HPF: ABNORMAL /HPF

## 2022-03-05 PROCEDURE — 99285 EMERGENCY DEPT VISIT HI MDM: CPT

## 2022-03-05 PROCEDURE — 700111 HCHG RX REV CODE 636 W/ 250 OVERRIDE (IP)

## 2022-03-05 PROCEDURE — 74177 CT ABD & PELVIS W/CONTRAST: CPT

## 2022-03-05 PROCEDURE — 700111 HCHG RX REV CODE 636 W/ 250 OVERRIDE (IP): Performed by: EMERGENCY MEDICINE

## 2022-03-05 PROCEDURE — 96374 THER/PROPH/DIAG INJ IV PUSH: CPT

## 2022-03-05 PROCEDURE — 81001 URINALYSIS AUTO W/SCOPE: CPT

## 2022-03-05 PROCEDURE — 36415 COLL VENOUS BLD VENIPUNCTURE: CPT

## 2022-03-05 PROCEDURE — 700117 HCHG RX CONTRAST REV CODE 255: Performed by: EMERGENCY MEDICINE

## 2022-03-05 PROCEDURE — 83690 ASSAY OF LIPASE: CPT

## 2022-03-05 PROCEDURE — 700105 HCHG RX REV CODE 258: Performed by: EMERGENCY MEDICINE

## 2022-03-05 PROCEDURE — 85025 COMPLETE CBC W/AUTO DIFF WBC: CPT

## 2022-03-05 PROCEDURE — 96375 TX/PRO/DX INJ NEW DRUG ADDON: CPT

## 2022-03-05 PROCEDURE — 94760 N-INVAS EAR/PLS OXIMETRY 1: CPT

## 2022-03-05 PROCEDURE — 84703 CHORIONIC GONADOTROPIN ASSAY: CPT

## 2022-03-05 PROCEDURE — 93005 ELECTROCARDIOGRAM TRACING: CPT

## 2022-03-05 PROCEDURE — 80053 COMPREHEN METABOLIC PANEL: CPT

## 2022-03-05 PROCEDURE — 93005 ELECTROCARDIOGRAM TRACING: CPT | Performed by: EMERGENCY MEDICINE

## 2022-03-05 RX ORDER — DICYCLOMINE HCL 20 MG
20 TABLET ORAL EVERY 6 HOURS PRN
Qty: 10 TABLET | Refills: 0 | Status: SHIPPED | OUTPATIENT
Start: 2022-03-05 | End: 2022-03-31

## 2022-03-05 RX ORDER — MORPHINE SULFATE 4 MG/ML
4 INJECTION INTRAVENOUS ONCE
Status: COMPLETED | OUTPATIENT
Start: 2022-03-05 | End: 2022-03-05

## 2022-03-05 RX ORDER — ONDANSETRON 4 MG/1
4 TABLET, ORALLY DISINTEGRATING ORAL EVERY 4 HOURS PRN
Qty: 10 TABLET | Refills: 0 | Status: SHIPPED | OUTPATIENT
Start: 2022-03-05 | End: 2022-12-19

## 2022-03-05 RX ORDER — ONDANSETRON 2 MG/ML
INJECTION INTRAMUSCULAR; INTRAVENOUS
Status: COMPLETED
Start: 2022-03-05 | End: 2022-03-05

## 2022-03-05 RX ORDER — MORPHINE SULFATE 4 MG/ML
INJECTION INTRAVENOUS
Status: COMPLETED
Start: 2022-03-05 | End: 2022-03-05

## 2022-03-05 RX ORDER — SODIUM CHLORIDE, SODIUM LACTATE, POTASSIUM CHLORIDE, CALCIUM CHLORIDE 600; 310; 30; 20 MG/100ML; MG/100ML; MG/100ML; MG/100ML
1000 INJECTION, SOLUTION INTRAVENOUS ONCE
Status: COMPLETED | OUTPATIENT
Start: 2022-03-05 | End: 2022-03-05

## 2022-03-05 RX ORDER — ONDANSETRON 2 MG/ML
4 INJECTION INTRAMUSCULAR; INTRAVENOUS ONCE
Status: COMPLETED | OUTPATIENT
Start: 2022-03-05 | End: 2022-03-05

## 2022-03-05 RX ORDER — PROCHLORPERAZINE EDISYLATE 5 MG/ML
10 INJECTION INTRAMUSCULAR; INTRAVENOUS ONCE
Status: COMPLETED | OUTPATIENT
Start: 2022-03-05 | End: 2022-03-05

## 2022-03-05 RX ADMIN — IOHEXOL 100 ML: 350 INJECTION, SOLUTION INTRAVENOUS at 05:09

## 2022-03-05 RX ADMIN — SODIUM CHLORIDE, POTASSIUM CHLORIDE, SODIUM LACTATE AND CALCIUM CHLORIDE 1000 ML: 600; 310; 30; 20 INJECTION, SOLUTION INTRAVENOUS at 03:57

## 2022-03-05 RX ADMIN — ONDANSETRON 4 MG: 2 INJECTION INTRAMUSCULAR; INTRAVENOUS at 03:58

## 2022-03-05 RX ADMIN — PROCHLORPERAZINE EDISYLATE 10 MG: 5 INJECTION INTRAMUSCULAR; INTRAVENOUS at 06:57

## 2022-03-05 RX ADMIN — MORPHINE SULFATE 4 MG: 4 INJECTION INTRAVENOUS at 03:58

## 2022-03-05 NOTE — ED NOTES
Pt resting quietly in gurney. Pt stated feeling better at this time. Pt is in no apparent distress and breathing is non-labored. Pt given water as per request and tolerating PO well; pt denies vomiting at this time. Will continue to monitor. Family at bedside.

## 2022-03-05 NOTE — ED NOTES
Pt c/o nausea with change of position; pt given compazine IV as per MAR.    Per ERP, to monitor pt prior to discharge since pt has not had compazine before.

## 2022-03-05 NOTE — DISCHARGE INSTRUCTIONS
Follow-up with primary care early next week for reevaluation, to establish care.    Zofran, oral dissolving tablet, every 4-6 hours as needed for nausea or vomiting.  Bentyl every 6 hours as needed for abdominal pain or cramping.    Encourage oral fluid hydration.  Clear liquid diet for 12 to 24 hours, then advance to bland foods as tolerated.    Return to the emergency department for persistent worsening abdominal pain, intractable vomiting, bloody stools, fever or other new concerns.

## 2022-03-05 NOTE — ED PROVIDER NOTES
ED Provider Note    CHIEF COMPLAINT  Chief Complaint   Patient presents with   • Abdominal Pain     N/v/d since 1800, chest pain and back pain since 2200.    • Chest Pain       HPI  Belgica Cesar is a 31 y.o. female who presents through triage with her  for abdominal pain, nausea, vomiting and diarrhea.  Patient states since 6 PM she has had symptoms.  Generalized abdominal pain, cramping with too numerous to count episodes of vomiting.  Also having diarrhea, nonbloody or mucoid.  Patient states she tried Zofran and Pepto-Bismol at home without relief.  She is not tolerating any oral fluids.  Generally weak.  She developed some chest pain and low back pain at 11 PM.  No shortness of breath, palpitations or syncope.  No fever or chills.    Patient believes she may have eaten something bad.  No sick contacts or travel.    LMP 2 weeks ago.  Trying to conceive, states she ovulated last week.  No vaginal bleeding or abnormal discharge.    REVIEW OF SYSTEMS  See HPI for further details. All other systems are negative.     PAST MEDICAL HISTORY   has a past medical history of Acute kidney injury (HCC) (2/11/2020), Anxiety, Asthma, Bulimia nervosa in full remission (5/27/2021), and Migraines.    SOCIAL HISTORY  Social History     Tobacco Use   • Smoking status: Never Smoker   • Smokeless tobacco: Never Used   Vaping Use   • Vaping Use: Never used   Substance and Sexual Activity   • Alcohol use: Not Currently     Comment: occasional   • Drug use: No   • Sexual activity: Yes     Partners: Male       SURGICAL HISTORY   has a past surgical history that includes inj cerv/thorac,w/ imaging (N/A, 10/27/2021).    CURRENT MEDICATIONS  Home Medications     Reviewed by Echo Mcgregor R.N. (Registered Nurse) on 03/05/22 at 0233  Med List Status: <None>   Medication Last Dose Status   EPINEPHrine (EPIPEN) 0.3 MG/0.3ML Solution Auto-injector solution for injection  Active   ESOMEPRAZOLE MAGNESIUM PO  Active   MAGNESIUM PO   "Active   multivitamin (THERAGRAN) Tab  Active   Pantoprazole Sodium (PROTONIX PO)  Active   rizatriptan (MAXALT-MLT) 10 MG disintegrating tablet  Active   VITAMIN E PO  Active                ALLERGIES  Allergies   Allergen Reactions   • Pcn [Penicillins] Rash     Pt reports that she gets a full body rash   • Clindamycin Rash     Pt reports that she gets a full body rash       PHYSICAL EXAM  VITAL SIGNS: /64   Pulse 93   Temp 36.1 °C (97 °F) (Temporal)   Resp 20   Ht 1.626 m (5' 4\")   Wt 56.1 kg (123 lb 10.9 oz)   LMP 02/14/2022 (Exact Date)   SpO2 99%   BMI 21.23 kg/m²   Pulse ox interpretation: I interpret this pulse ox as normal.  Constitutional: Alert in no apparent distress.  HENT: Normocephalic, atraumatic. Bilateral external ears normal, Nose normal.   Eyes: Conjunctiva normal.   Neck: Normal range of motion  Lymphatic: No lymphadenopathy noted.  No inguinal mass or lymphadenopathy.  Cardiovascular: Normal peripheral perfusion.  Thorax & Lungs: Nonlabored respirations.  Abdomen: Soft, non-distended.  Tender to palpation right lower quadrant without rebound, guarding or peritonitis.  Skin: Warm, Dry, No erythema, No rash.   Musculoskeletal: Good range of motion in all major joints.  X4.  Speech clear and cohesive.  Moves 4 extremity spontaneously.  Neurologic: Alert , no gross focal deficit noted.  Psychiatric: Affect normal, Judgment normal, Mood normal.       DIAGNOSTIC STUDIES / PROCEDURES    LABS  Results for orders placed or performed during the hospital encounter of 03/05/22   CBC WITH DIFFERENTIAL   Result Value Ref Range    WBC 15.5 (H) 4.8 - 10.8 K/uL    RBC 5.48 (H) 4.20 - 5.40 M/uL    Hemoglobin 16.8 (H) 12.0 - 16.0 g/dL    Hematocrit 48.3 (H) 37.0 - 47.0 %    MCV 88.1 81.4 - 97.8 fL    MCH 30.7 27.0 - 33.0 pg    MCHC 34.8 33.6 - 35.0 g/dL    RDW 40.1 35.9 - 50.0 fL    Platelet Count 236 164 - 446 K/uL    MPV 11.8 9.0 - 12.9 fL    Neutrophils-Polys 91.00 (H) 44.00 - 72.00 %    " Lymphocytes 3.20 (L) 22.00 - 41.00 %    Monocytes 4.40 0.00 - 13.40 %    Eosinophils 0.60 0.00 - 6.90 %    Basophils 0.30 0.00 - 1.80 %    Immature Granulocytes 0.50 0.00 - 0.90 %    Nucleated RBC 0.00 /100 WBC    Neutrophils (Absolute) 14.12 (H) 2.00 - 7.15 K/uL    Lymphs (Absolute) 0.50 (L) 1.00 - 4.80 K/uL    Monos (Absolute) 0.69 0.00 - 0.85 K/uL    Eos (Absolute) 0.09 0.00 - 0.51 K/uL    Baso (Absolute) 0.04 0.00 - 0.12 K/uL    Immature Granulocytes (abs) 0.07 0.00 - 0.11 K/uL    NRBC (Absolute) 0.00 K/uL   COMP METABOLIC PANEL   Result Value Ref Range    Sodium 138 135 - 145 mmol/L    Potassium 4.1 3.6 - 5.5 mmol/L    Chloride 101 96 - 112 mmol/L    Co2 20 20 - 33 mmol/L    Anion Gap 17.0 (H) 7.0 - 16.0    Glucose 110 (H) 65 - 99 mg/dL    Bun 22 8 - 22 mg/dL    Creatinine 0.87 0.50 - 1.40 mg/dL    Calcium 10.2 8.5 - 10.5 mg/dL    AST(SGOT) 23 12 - 45 U/L    ALT(SGPT) 15 2 - 50 U/L    Alkaline Phosphatase 94 30 - 99 U/L    Total Bilirubin 1.4 0.1 - 1.5 mg/dL    Albumin 5.1 (H) 3.2 - 4.9 g/dL    Total Protein 7.9 6.0 - 8.2 g/dL    Globulin 2.8 1.9 - 3.5 g/dL    A-G Ratio 1.8 g/dL   LIPASE   Result Value Ref Range    Lipase 17 11 - 82 U/L   HCG QUAL SERUM   Result Value Ref Range    Beta-Hcg Qualitative Serum Negative Negative   URINALYSIS,CULTURE IF INDICATED    Specimen: Blood   Result Value Ref Range    Color DK Yellow     Character Cloudy (A)     Specific Gravity 1.031 <1.035    Ph 5.5 5.0 - 8.0    Glucose Negative Negative mg/dL    Ketones >=160 Negative mg/dL    Protein 30 (A) Negative mg/dL    Bilirubin Small (A) Negative    Urobilinogen, Urine 1.0 Negative    Nitrite Negative Negative    Leukocyte Esterase Trace (A) Negative    Occult Blood Small (A) Negative    Micro Urine Req Microscopic    URINE MICROSCOPIC (W/UA)   Result Value Ref Range    WBC 2-5 /hpf    RBC 5-10 (A) /hpf    Bacteria Moderate (A) None /hpf    Epithelial Cells Many (A) /hpf    Hyaline Cast 0-2 /lpf   ESTIMATED GFR   Result Value Ref  Range    GFR If African American >60 >60 mL/min/1.73 m 2    GFR If Non African American >60 >60 mL/min/1.73 m 2   EKG (NOW)   Result Value Ref Range    Report       [facility] Emergency Dept.    Test Date:  2022  Pt Name:    ROBBY ABREU               Department: ER  MRN:        [id number]                      Room:  Gender:     Female                       Technician: [id number]  :        1990                   Requested By:ER TRIAGE PROTOCOL  Order #:    [id number]                    Reading MD: KALIE RAY DO    Measurements  Intervals                                Axis  Rate:       82                           P:          63  VT:         156                          QRS:        31  QRSD:       98                           T:          30  QT:         368  QTc:        430    Interpretive Statements  SINUS RHYTHM  RSR' IN V1 OR V2, RIGHT VCD OR RVH  No previous ECG available for comparison  Electronically Signed On 3-5-2022 6:20:51 PST by KALIE RAY DO         RADIOLOGY  CT-ABDOMEN-PELVIS WITH   Final Result      1.  No acute abnormalities are identified in the abdomen or pelvis.          COURSE & MEDICAL DECISION MAKING  Nursing notes and vital signs were reviewed. (See chart for details)  The patients records were reviewed, history was obtained from the patient;     0415 -patient seen evaluated bedside.  Triage by ERP prior to my arrival.  Patient is feeling better after interventions that include morphine, Zofran, IV fluid still infusing.  She continues to have some abdominal discomfort, across the low abdomen but seems to be localizing to the right lower quadrant.  This is reproducible on exam.  Still having some mild, but improved low back pain.  Leukocytosis, WBC 15.5, leftward shift without bandemia.  No electrolyte derangement.  Normal LFTs and lipase.  Pregnancy negative.  Nonspecific pyuria.  After shared decision making, and given pain now somewhat localizing to  the right lower quadrant, will add CT for definitive evaluation.    6:20 AM CT is negative.  Symptomatology much improved after ED interventions.  Will p.o. challenge.    7:19 AM patient tolerated p.o.'s without difficulty.  She did have some recurrent nausea.  Resolved again after Compazine.  Abdominal exam is benign.    ED evaluation most suggestive of viral gastrointestinal illness, however cannot exclude food reaction either.  Pain was generalized with nausea, vomiting and diarrhea, seem to localize to the right lower quadrant during this ED evaluation but CT is unrevealing.  Labs are really quite unremarkable.  Nonspecific leukocytosis without bandemia.  No electrolyte derangement.  No UTI.  Pregnancy negative.  Hemodynamically stable without fever, tachycardia, hypotension.  All symptoms improved with ED intervention to include IV fluid bolus for clinical dehydration and vomiting, as well as morphine and Zofran.  She is tolerating oral fluids before discharge.    Patient is stable for discharge at this time, anticipatory guidance provided, Zofran for nausea or vomiting, Bentyl for pain or cramping, close follow-up is encouraged, and strict ED return instructions have been detailed. Patient is agreeable to the disposition and plan.      FINAL IMPRESSION  (R10.84) Generalized abdominal pain  (R10.31) RLQ abdominal pain  (R11.2,  R19.7) Nausea vomiting and diarrhea      Electronically signed by: Daylin Ruiz D.O., 3/5/2022 4:20 AM      This dictation was created using voice recognition software. The accuracy of the dictation is limited to the abilities of the software. I expect there may be some errors of grammar and possibly content. The nursing notes were reviewed and certain aspects of this information were incorporated into this note.

## 2022-03-05 NOTE — ED NOTES
Ordered CT completed.    Pt resting quietly in [facility]. No apparent distress noted with non-labored breathing. VS stable and will continue to monitor pt.

## 2022-03-05 NOTE — ED NOTES
"Pt resting quietly in gurney. Pt stated feeling better. Per pt, nausea is \"okay\" and denies vomiting at this time. Pt tolerating water without any difficulty. Pt is in no apparent distress with non-labored breathing. VS stable and will continue to monitor pt.  "

## 2022-03-05 NOTE — ED NOTES
Pt resting quietly in gurney. Pt stated nausea is better at this time. Pt given water; tolerating PO without any difficulty. Will continue to monitor pt.

## 2022-03-31 ENCOUNTER — OFFICE VISIT (OUTPATIENT)
Dept: MEDICAL GROUP | Facility: IMAGING CENTER | Age: 32
End: 2022-03-31
Payer: COMMERCIAL

## 2022-03-31 VITALS
DIASTOLIC BLOOD PRESSURE: 60 MMHG | HEIGHT: 64 IN | RESPIRATION RATE: 12 BRPM | TEMPERATURE: 98 F | OXYGEN SATURATION: 100 % | WEIGHT: 127 LBS | HEART RATE: 54 BPM | SYSTOLIC BLOOD PRESSURE: 96 MMHG | BODY MASS INDEX: 21.68 KG/M2

## 2022-03-31 DIAGNOSIS — Z13.31 DEPRESSION SCREENING: ICD-10-CM

## 2022-03-31 DIAGNOSIS — G43.009 MIGRAINE WITHOUT AURA AND WITHOUT STATUS MIGRAINOSUS, NOT INTRACTABLE: ICD-10-CM

## 2022-03-31 DIAGNOSIS — K21.00 GASTROESOPHAGEAL REFLUX DISEASE WITH ESOPHAGITIS WITHOUT HEMORRHAGE: ICD-10-CM

## 2022-03-31 DIAGNOSIS — Z11.59 NEED FOR HEPATITIS C SCREENING TEST: ICD-10-CM

## 2022-03-31 DIAGNOSIS — Z76.89 ENCOUNTER TO ESTABLISH CARE WITH NEW DOCTOR: ICD-10-CM

## 2022-03-31 DIAGNOSIS — R82.90 ABNORMAL URINALYSIS: ICD-10-CM

## 2022-03-31 DIAGNOSIS — Z13.1 DIABETES MELLITUS SCREENING: ICD-10-CM

## 2022-03-31 DIAGNOSIS — Z11.3 SCREENING EXAMINATION FOR SEXUALLY TRANSMITTED DISEASE: ICD-10-CM

## 2022-03-31 DIAGNOSIS — G47.26 EPISODIC CIRCADIAN RHYTHM SLEEP DISORDER, SHIFT WORK TYPE: ICD-10-CM

## 2022-03-31 DIAGNOSIS — N92.6 IRREGULAR MENSTRUATION: ICD-10-CM

## 2022-03-31 DIAGNOSIS — Z13.6 SCREENING FOR CARDIOVASCULAR CONDITION: ICD-10-CM

## 2022-03-31 PROBLEM — Z00.00 WELL ADULT EXAM: Status: RESOLVED | Noted: 2018-04-19 | Resolved: 2022-03-31

## 2022-03-31 PROBLEM — Z02.89 ENCOUNTER FOR COMPLETION OF FORM WITH PATIENT: Status: RESOLVED | Noted: 2021-04-28 | Resolved: 2022-03-31

## 2022-03-31 LAB
APPEARANCE UR: CLEAR
BILIRUB UR STRIP-MCNC: NEGATIVE MG/DL
COLOR UR AUTO: YELLOW
GLUCOSE UR STRIP.AUTO-MCNC: NEGATIVE MG/DL
KETONES UR STRIP.AUTO-MCNC: NEGATIVE MG/DL
LEUKOCYTE ESTERASE UR QL STRIP.AUTO: NEGATIVE
NITRITE UR QL STRIP.AUTO: NEGATIVE
PH UR STRIP.AUTO: 6.5 [PH] (ref 5–8)
PROT UR QL STRIP: NEGATIVE MG/DL
RBC UR QL AUTO: NEGATIVE
SP GR UR STRIP.AUTO: 1.01
UROBILINOGEN UR STRIP-MCNC: 0.2 MG/DL

## 2022-03-31 PROCEDURE — 81002 URINALYSIS NONAUTO W/O SCOPE: CPT | Performed by: CLINICAL NURSE SPECIALIST

## 2022-03-31 PROCEDURE — 99214 OFFICE O/P EST MOD 30 MIN: CPT | Performed by: CLINICAL NURSE SPECIALIST

## 2022-03-31 ASSESSMENT — PAIN SCALES - GENERAL: PAINLEVEL: NO PAIN

## 2022-03-31 ASSESSMENT — ENCOUNTER SYMPTOMS
NAUSEA: 1
HEARTBURN: 1
CONSTIPATION: 0
DIARRHEA: 0

## 2022-03-31 ASSESSMENT — FIBROSIS 4 INDEX: FIB4 SCORE: 0.78

## 2022-03-31 ASSESSMENT — PATIENT HEALTH QUESTIONNAIRE - PHQ9: CLINICAL INTERPRETATION OF PHQ2 SCORE: 0

## 2022-03-31 NOTE — ASSESSMENT & PLAN NOTE
Previously regular periods but now irregular for 4 months. Also having vaginal dryness, has to use lube with sex.  Tracking ovulation and has had surges on a stick but not in cervical mucus.  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 Not previously on birth control.  No previous pregnancies.  She donated eggs 3 times to families last occurrence Spring 2019.  No excess hair growth or clitoral enlargement.  Cramps have worsened recently worse on day 1 and very heavy.  Lightens up after and occasionally has lingering spotting.

## 2022-03-31 NOTE — PROGRESS NOTES
Subjective     Belgica Cesar is a 31 y.o. female who presents with Establish Care and Menstrual Problem (Irregular periods, vaginal dryness )            HPI  Belgica is in clinic to establish care following a visit to the ED for abdominal pain, vomiting and diarrhea.    Irregular menstruation  Previously regular periods but now irregular for 4 months. Also having vaginal dryness, has to use lube with sex.  Tracking ovulation and has had surges on a stick but not in cervical mucus.  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 Not previously on birth control.  No previous pregnancies.  She donated eggs 3 times to families last occurrence Spring 2019.  No excess hair growth or clitoral enlargement.  Cramps have worsened recently worse on day 1 and very heavy.  Lightens up after and occasionally has lingering spotting.     Migraine without aura and without status migrainosus, not intractable  Resolved since injection in occipital nerve    Gastroesophageal reflux disease with esophagitis  Chronic. Dietary control, no coffee or late eating. Tums as needed.  No pantoprazole or nexium.        Review of Systems   Gastrointestinal: Positive for heartburn and nausea (intermittent). Negative for constipation and diarrhea.      See HPI      Allergies   Allergen Reactions   • Pcn [Penicillins] Rash     Pt reports that she gets a full body rash   • Clindamycin Rash     Pt reports that she gets a full body rash       Current Outpatient Medications on File Prior to Visit   Medication Sig Dispense Refill   • Prenatal Vit-Fe Fumarate-FA (PRENATAL VITAMIN PO) Take  by mouth.     • VITAMIN D PO Take  by mouth.     • EPINEPHrine (EPIPEN) 0.3 MG/0.3ML Solution Auto-injector solution for injection      • VITAMIN E PO Take 1 Cap by mouth every day.     • ondansetron (ZOFRAN ODT) 4 MG TABLET DISPERSIBLE Take 1 Tablet by mouth every four hours as needed. (Patient not taking: Reported on 3/31/2022) 10 Tablet 0   •  "rizatriptan (MAXALT-MLT) 10 MG disintegrating tablet Take 1 tablet by mouth 1 time a day as needed for Migraine. (Patient not taking: Reported on 3/31/2022) 9 tablet 1   • MAGNESIUM PO Take 1 Cap by mouth every day. (Patient not taking: Reported on 3/31/2022)       No current facility-administered medications on file prior to visit.           Objective     BP (!) 96/60   Pulse (!) 54   Temp 36.7 °C (98 °F)   Resp 12   Ht 1.626 m (5' 4\")   Wt 57.6 kg (127 lb)   LMP 03/15/2022 (Exact Date)   SpO2 100%   BMI 21.80 kg/m²      Physical Exam  Constitutional:       General: She is not in acute distress.     Appearance: Normal appearance. She is not ill-appearing, toxic-appearing or diaphoretic.   HENT:      Head: Normocephalic and atraumatic.   Eyes:      General: No scleral icterus.     Pupils: Pupils are equal, round, and reactive to light.   Cardiovascular:      Rate and Rhythm: Normal rate and regular rhythm.      Heart sounds: Normal heart sounds.   Pulmonary:      Effort: Pulmonary effort is normal.      Breath sounds: Normal breath sounds.   Abdominal:      General: Abdomen is flat. Bowel sounds are normal.      Palpations: Abdomen is soft. There is no mass.      Tenderness: There is abdominal tenderness (RLQ and LUQ). There is no guarding or rebound.   Skin:     General: Skin is warm and dry.   Neurological:      Mental Status: She is alert and oriented to person, place, and time.      Gait: Gait normal.   Psychiatric:         Mood and Affect: Mood normal.         Behavior: Behavior normal.         Thought Content: Thought content normal.         Judgment: Judgment normal.                CT 3/5abdomen/pelvis    IMPRESSION:     1.  No acute abnormalities are identified in the abdomen or pelvis.             Assessment & Plan        1. Encounter to establish care with new doctor      2. Depression screening  Score 0    3. Irregular menstruation  Possibly related to night shift work.  Referral to acupuncture. " Labs ordered. She declined imaging today but will consider if labs are unrevealing. She has appointment with gyn in 2 months.    - CBC WITH DIFFERENTIAL; Future  - Comp Metabolic Panel; Future  - TSH WITH REFLEX TO FT4; Future  - ANTI-MULLERIAN HORMONE([facility]); Future  - PROLACTIN; Future  - Referral for Acupuncture    4. Need for hepatitis C screening test    - HEP C VIRUS ANTIBODY; Future    5. Screening examination for sexually transmitted disease    - T.PALLIDUM AB EIA; Future  - HIV AG/AB COMBO ASSAY SCREENING; Future  - Chlamydia/GC PCR (Urine); Future    6. Abnormal urinalysis  Asymptomatic. Will repeat to assess for resolution.  POCT UA normal.    - POCT Urinalysis    7. Diabetes mellitus screening    - Comp Metabolic Panel; Future  - HEMOGLOBIN A1C; Future    8. Screening for cardiovascular condition    - Lipid Profile; Future  - TSH WITH REFLEX TO FT4; Future  - VITAMIN D,25 HYDROXY; Future    9. Migraine without aura and without status migrainosus, not intractable  Controlled    10. Gastroesophageal reflux disease with esophagitis without hemorrhage  Chronic, controlled.     11. Episodic circadian rhythm sleep disorder, shift work type  Night shift RN in ICU.  Takes supplemental vitamin D.    - VITAMIN D,25 HYDROXY; Future    Return if symptoms worsen or fail to improve, for With test results.

## 2022-04-01 ENCOUNTER — HOSPITAL ENCOUNTER (OUTPATIENT)
Dept: LAB | Facility: MEDICAL CENTER | Age: 32
End: 2022-04-01
Attending: CLINICAL NURSE SPECIALIST
Payer: COMMERCIAL

## 2022-04-01 DIAGNOSIS — Z11.59 NEED FOR HEPATITIS C SCREENING TEST: ICD-10-CM

## 2022-04-01 DIAGNOSIS — N92.6 IRREGULAR MENSTRUATION: ICD-10-CM

## 2022-04-01 DIAGNOSIS — Z13.1 DIABETES MELLITUS SCREENING: ICD-10-CM

## 2022-04-01 DIAGNOSIS — Z11.3 SCREENING EXAMINATION FOR SEXUALLY TRANSMITTED DISEASE: ICD-10-CM

## 2022-04-01 DIAGNOSIS — G47.26 EPISODIC CIRCADIAN RHYTHM SLEEP DISORDER, SHIFT WORK TYPE: ICD-10-CM

## 2022-04-01 DIAGNOSIS — Z13.6 SCREENING FOR CARDIOVASCULAR CONDITION: ICD-10-CM

## 2022-04-01 LAB
25(OH)D3 SERPL-MCNC: 45 NG/ML (ref 30–100)
ALBUMIN SERPL BCP-MCNC: 4.6 G/DL (ref 3.2–4.9)
ALBUMIN/GLOB SERPL: 2.1 G/DL
ALP SERPL-CCNC: 76 U/L (ref 30–99)
ALT SERPL-CCNC: 15 U/L (ref 2–50)
ANION GAP SERPL CALC-SCNC: 12 MMOL/L (ref 7–16)
AST SERPL-CCNC: 21 U/L (ref 12–45)
BASOPHILS # BLD AUTO: 0.4 % (ref 0–1.8)
BASOPHILS # BLD: 0.03 K/UL (ref 0–0.12)
BILIRUB SERPL-MCNC: 0.4 MG/DL (ref 0.1–1.5)
BUN SERPL-MCNC: 15 MG/DL (ref 8–22)
CALCIUM SERPL-MCNC: 9.4 MG/DL (ref 8.5–10.5)
CHLORIDE SERPL-SCNC: 104 MMOL/L (ref 96–112)
CHOLEST SERPL-MCNC: 173 MG/DL (ref 100–199)
CO2 SERPL-SCNC: 23 MMOL/L (ref 20–33)
CREAT SERPL-MCNC: 0.81 MG/DL (ref 0.5–1.4)
EOSINOPHIL # BLD AUTO: 0.21 K/UL (ref 0–0.51)
EOSINOPHIL NFR BLD: 2.8 % (ref 0–6.9)
ERYTHROCYTE [DISTWIDTH] IN BLOOD BY AUTOMATED COUNT: 42.6 FL (ref 35.9–50)
EST. AVERAGE GLUCOSE BLD GHB EST-MCNC: 91 MG/DL
FASTING STATUS PATIENT QL REPORTED: NORMAL
GFR SERPLBLD CREATININE-BSD FMLA CKD-EPI: 99 ML/MIN/1.73 M 2
GLOBULIN SER CALC-MCNC: 2.2 G/DL (ref 1.9–3.5)
GLUCOSE SERPL-MCNC: 82 MG/DL (ref 65–99)
HBA1C MFR BLD: 4.8 % (ref 4–5.6)
HCT VFR BLD AUTO: 43.8 % (ref 37–47)
HCV AB SER QL: NORMAL
HDLC SERPL-MCNC: 51 MG/DL
HGB BLD-MCNC: 14.5 G/DL (ref 12–16)
HIV 1+2 AB+HIV1 P24 AG SERPL QL IA: NORMAL
IMM GRANULOCYTES # BLD AUTO: 0.02 K/UL (ref 0–0.11)
IMM GRANULOCYTES NFR BLD AUTO: 0.3 % (ref 0–0.9)
LDLC SERPL CALC-MCNC: 109 MG/DL
LYMPHOCYTES # BLD AUTO: 2.1 K/UL (ref 1–4.8)
LYMPHOCYTES NFR BLD: 27.8 % (ref 22–41)
MCH RBC QN AUTO: 30.7 PG (ref 27–33)
MCHC RBC AUTO-ENTMCNC: 33.1 G/DL (ref 33.6–35)
MCV RBC AUTO: 92.8 FL (ref 81.4–97.8)
MONOCYTES # BLD AUTO: 0.75 K/UL (ref 0–0.85)
MONOCYTES NFR BLD AUTO: 9.9 % (ref 0–13.4)
NEUTROPHILS # BLD AUTO: 4.45 K/UL (ref 2–7.15)
NEUTROPHILS NFR BLD: 58.8 % (ref 44–72)
NRBC # BLD AUTO: 0 K/UL
NRBC BLD-RTO: 0 /100 WBC
PLATELET # BLD AUTO: 197 K/UL (ref 164–446)
PMV BLD AUTO: 11.9 FL (ref 9–12.9)
POTASSIUM SERPL-SCNC: 4.7 MMOL/L (ref 3.6–5.5)
PROLACTIN SERPL-MCNC: 13.1 NG/ML (ref 2.8–26)
PROT SERPL-MCNC: 6.8 G/DL (ref 6–8.2)
RBC # BLD AUTO: 4.72 M/UL (ref 4.2–5.4)
SODIUM SERPL-SCNC: 139 MMOL/L (ref 135–145)
T PALLIDUM AB SER QL IA: NORMAL
TRIGL SERPL-MCNC: 67 MG/DL (ref 0–149)
TSH SERPL DL<=0.005 MIU/L-ACNC: 1.77 UIU/ML (ref 0.38–5.33)
WBC # BLD AUTO: 7.6 K/UL (ref 4.8–10.8)

## 2022-04-01 PROCEDURE — 86780 TREPONEMA PALLIDUM: CPT

## 2022-04-01 PROCEDURE — 84146 ASSAY OF PROLACTIN: CPT

## 2022-04-01 PROCEDURE — 36415 COLL VENOUS BLD VENIPUNCTURE: CPT

## 2022-04-01 PROCEDURE — 83520 IMMUNOASSAY QUANT NOS NONAB: CPT

## 2022-04-01 PROCEDURE — 86803 HEPATITIS C AB TEST: CPT

## 2022-04-01 PROCEDURE — 84443 ASSAY THYROID STIM HORMONE: CPT

## 2022-04-01 PROCEDURE — 85025 COMPLETE CBC W/AUTO DIFF WBC: CPT

## 2022-04-01 PROCEDURE — 87389 HIV-1 AG W/HIV-1&-2 AB AG IA: CPT

## 2022-04-01 PROCEDURE — 80061 LIPID PANEL: CPT

## 2022-04-01 PROCEDURE — 83036 HEMOGLOBIN GLYCOSYLATED A1C: CPT

## 2022-04-01 PROCEDURE — 80053 COMPREHEN METABOLIC PANEL: CPT

## 2022-04-01 PROCEDURE — 82306 VITAMIN D 25 HYDROXY: CPT

## 2022-04-01 PROCEDURE — 87491 CHLMYD TRACH DNA AMP PROBE: CPT

## 2022-04-01 PROCEDURE — 87591 N.GONORRHOEAE DNA AMP PROB: CPT

## 2022-04-06 LAB — MIS SERPL-MCNC: 3.94 NG/ML (ref 0.18–11.71)

## 2022-04-14 ENCOUNTER — PATIENT MESSAGE (OUTPATIENT)
Dept: MEDICAL GROUP | Facility: IMAGING CENTER | Age: 32
End: 2022-04-14
Payer: COMMERCIAL

## 2022-05-04 ENCOUNTER — OFFICE VISIT (OUTPATIENT)
Dept: URGENT CARE | Facility: PHYSICIAN GROUP | Age: 32
End: 2022-05-04

## 2022-05-04 DIAGNOSIS — Z23 NEED FOR VACCINATION: ICD-10-CM

## 2022-05-04 NOTE — NON-PROVIDER
Belgica Cesar is a 31 y.o. female here for a non-provider visit for PPD placement -- Step 1 of 1    Reason for PPD:  school requirement    1. TB evaluation questionnaire completed by patient? Yes      -  If any answers marked yes did you contact a provider prior to placing? Not Indicated  2.  Patient notified to return to clinic for reading on: Friday 5/6/223 after 1455 or Saturday 5/7/22 before 1455  3.  PPD Placement documentation completed on TB evaluation questionnaire? Yes  4.  Location of TB evaluation questionnaire filed: front office

## 2022-05-07 ENCOUNTER — NON-PROVIDER VISIT (OUTPATIENT)
Dept: URGENT CARE | Facility: PHYSICIAN GROUP | Age: 32
End: 2022-05-07

## 2022-05-07 LAB — TB WHEAL 3D P 5 TU DIAM: NEGATIVE MM

## 2022-05-07 NOTE — NON-PROVIDER
Belgica Cesar is a 31 y.o. female here for a non-provider visit for PPD reading -- Step 1 of 1.      1.  Resulted in Epic under enter/edit results? Yes   2.  TB evaluation questionnaire scanned into chart and original given to patient?Yes      3. Was induration greater than 0 mm? No.      Routed to PCP? No

## 2022-05-17 ENCOUNTER — PATIENT MESSAGE (OUTPATIENT)
Dept: MEDICAL GROUP | Facility: IMAGING CENTER | Age: 32
End: 2022-05-17
Payer: COMMERCIAL

## 2022-05-17 DIAGNOSIS — Z31.41 FERTILITY TESTING: ICD-10-CM

## 2022-05-17 DIAGNOSIS — N92.6 IRREGULAR MENSTRUATION: ICD-10-CM

## 2022-06-02 ENCOUNTER — HOSPITAL ENCOUNTER (OUTPATIENT)
Dept: LAB | Facility: MEDICAL CENTER | Age: 32
End: 2022-06-02
Attending: OBSTETRICS & GYNECOLOGY
Payer: COMMERCIAL

## 2022-06-02 LAB — PROGEST SERPL-MCNC: 16.4 NG/ML

## 2022-06-02 PROCEDURE — 84402 ASSAY OF FREE TESTOSTERONE: CPT

## 2022-06-02 PROCEDURE — 84270 ASSAY OF SEX HORMONE GLOBUL: CPT

## 2022-06-02 PROCEDURE — 84144 ASSAY OF PROGESTERONE: CPT

## 2022-06-02 PROCEDURE — 36415 COLL VENOUS BLD VENIPUNCTURE: CPT

## 2022-06-02 PROCEDURE — 84403 ASSAY OF TOTAL TESTOSTERONE: CPT

## 2022-06-16 ENCOUNTER — PATIENT MESSAGE (OUTPATIENT)
Dept: PHYSICAL MEDICINE AND REHAB | Facility: MEDICAL CENTER | Age: 32
End: 2022-06-16
Payer: COMMERCIAL

## 2022-06-16 LAB
SHBG SERPL-SCNC: 119 NMOL/L (ref 25–122)
TESTOST FREE SERPL-MCNC: 2.3 PG/ML (ref 1.3–9.2)
TESTOST SERPL-MCNC: 34 NG/DL (ref 9–55)

## 2022-06-22 ENCOUNTER — OFFICE VISIT (OUTPATIENT)
Dept: PHYSICAL MEDICINE AND REHAB | Facility: MEDICAL CENTER | Age: 32
End: 2022-06-22
Payer: COMMERCIAL

## 2022-06-22 VITALS
WEIGHT: 125 LBS | HEIGHT: 64 IN | HEART RATE: 74 BPM | TEMPERATURE: 97.9 F | OXYGEN SATURATION: 97 % | SYSTOLIC BLOOD PRESSURE: 100 MMHG | DIASTOLIC BLOOD PRESSURE: 62 MMHG | BODY MASS INDEX: 21.34 KG/M2

## 2022-06-22 DIAGNOSIS — M50.20 CERVICAL DISC HERNIATION: ICD-10-CM

## 2022-06-22 DIAGNOSIS — M54.12 CERVICAL RADICULOPATHY: ICD-10-CM

## 2022-06-22 DIAGNOSIS — M54.81 OCCIPITAL NEURALGIA OF LEFT SIDE: ICD-10-CM

## 2022-06-22 DIAGNOSIS — G43.009 MIGRAINE WITHOUT AURA AND WITHOUT STATUS MIGRAINOSUS, NOT INTRACTABLE: ICD-10-CM

## 2022-06-22 PROCEDURE — 64405 NJX AA&/STRD GR OCPL NRV: CPT | Performed by: PHYSICAL MEDICINE & REHABILITATION

## 2022-06-22 PROCEDURE — 99214 OFFICE O/P EST MOD 30 MIN: CPT | Mod: 25 | Performed by: PHYSICAL MEDICINE & REHABILITATION

## 2022-06-22 PROCEDURE — 76942 ECHO GUIDE FOR BIOPSY: CPT | Performed by: PHYSICAL MEDICINE & REHABILITATION

## 2022-06-22 RX ORDER — DEXAMETHASONE SODIUM PHOSPHATE 4 MG/ML
4 INJECTION, SOLUTION INTRA-ARTICULAR; INTRALESIONAL; INTRAMUSCULAR; INTRAVENOUS; SOFT TISSUE ONCE
Status: COMPLETED | OUTPATIENT
Start: 2022-06-22 | End: 2022-06-22

## 2022-06-22 RX ADMIN — DEXAMETHASONE SODIUM PHOSPHATE 4 MG: 4 INJECTION, SOLUTION INTRA-ARTICULAR; INTRALESIONAL; INTRAMUSCULAR; INTRAVENOUS; SOFT TISSUE at 13:26

## 2022-06-22 ASSESSMENT — PAIN SCALES - GENERAL: PAINLEVEL: 3=SLIGHT PAIN

## 2022-06-22 ASSESSMENT — FIBROSIS 4 INDEX: FIB4 SCORE: 0.85

## 2022-06-22 ASSESSMENT — PATIENT HEALTH QUESTIONNAIRE - PHQ9: CLINICAL INTERPRETATION OF PHQ2 SCORE: 0

## 2022-06-22 NOTE — PROGRESS NOTES
Follow up patient note  Interventional spine and sports physiatry, Physical medicine rehabilitation      Chief complaint:   Chief Complaint   Patient presents with   • Follow-Up     Headache         HISTORY    Please see new patient note by Dr Ordonez,  for more details.     HPI  Patient identification: Belgica Cesar  1990,   female  With Diagnoses of Occipital neuralgia of left side, Cervical disc herniation, Cervical radiculopathy, and Migraine without aura and without status migrainosus, not intractable were pertinent to this visit.   Procedures:  2019: C7-T1 interlaminar epidural steroid injection with significant improvement post procedure especially in the radicular component   10/27/2021 C7-T1 interlaminar epidural steroid injection  2021 left occipital nerve block with 100% pain relief post procedure.    The patient had outstanding pain relief with 1 her percent pain relief after the occipital nerve blocks done previously which lasted for greater than 5 months.  The patient especially over the last months been having worsening of headaches in the same distribution of the left occiput which radiates towards the left ear.  Constant with intermittent flares.  Flares of pain are severe in intensity.  She denies recent trauma or injuries.  Headaches do interrupt sleep and focus.    Medications tried include Excedrin, ibuprofen, Fioricet, sumatriptan       ROS Red Flags :   Fever, Chills, Sweats: Denies  Involuntary Weight Loss: Denies  Bowel/Bladder Incontinence: Denies  Saddle Anesthesia: Denies        PMHx:   Past Medical History:   Diagnosis Date   • Acute kidney injury (HCC) 2020   • Anxiety    • Asthma    • Bulimia nervosa in full remission 2021   • Migraines        PSHx:   Past Surgical History:   Procedure Laterality Date   • NC INJ CERV/THORAC,W/ IMAGING N/A 10/27/2021    Procedure: C7-T1 interlaminar epidural steroid injection;  Surgeon: Gilles Ordonez M.D.;  [facility];  Service: Pain Management       Family history   Family History   Problem Relation Age of Onset   • Hypertension Mother    • No Known Problems Father          Medications:   Outpatient Medications Marked as Taking for the 6/22/22 encounter (Office Visit) with Gilles Ordonez M.D.   Medication Sig Dispense Refill   • Prenatal Vit-Fe Fumarate-FA (PRENATAL VITAMIN PO) Take  by mouth.     • VITAMIN D PO Take  by mouth.     • EPINEPHrine (EPIPEN) 0.3 MG/0.3ML Solution Auto-injector solution for injection      • VITAMIN E PO Take 1 Cap by mouth every day.          Current Outpatient Medications on File Prior to Visit   Medication Sig Dispense Refill   • Prenatal Vit-Fe Fumarate-FA (PRENATAL VITAMIN PO) Take  by mouth.     • VITAMIN D PO Take  by mouth.     • EPINEPHrine (EPIPEN) 0.3 MG/0.3ML Solution Auto-injector solution for injection      • VITAMIN E PO Take 1 Cap by mouth every day.     • ondansetron (ZOFRAN ODT) 4 MG TABLET DISPERSIBLE Take 1 Tablet by mouth every four hours as needed. (Patient not taking: Reported on 3/31/2022) 10 Tablet 0   • rizatriptan (MAXALT-MLT) 10 MG disintegrating tablet Take 1 tablet by mouth 1 time a day as needed for Migraine. (Patient not taking: Reported on 3/31/2022) 9 tablet 1   • MAGNESIUM PO Take 1 Cap by mouth every day. (Patient not taking: Reported on 3/31/2022)       No current facility-administered medications on file prior to visit.         Allergies:   Allergies   Allergen Reactions   • Pcn [Penicillins] Rash     Pt reports that she gets a full body rash   • Clindamycin Rash     Pt reports that she gets a full body rash       Social Hx:   Social History     Socioeconomic History   • Marital status: Single     Spouse name: Not on file   • Number of children: Not on file   • Years of education: Not on file   • Highest education level: Not on file   Occupational History   • Not on file   Tobacco Use   • Smoking status: Never Smoker   • Smokeless  "tobacco: Never Used   Vaping Use   • Vaping Use: Never used   Substance and Sexual Activity   • Alcohol use: Not Currently     Comment: occasional   • Drug use: No   • Sexual activity: Yes     Partners: Male   Other Topics Concern   •  Service No   • Blood Transfusions No   • Caffeine Concern No   • Occupational Exposure No   • Hobby Hazards No   • Sleep Concern No   • Stress Concern No   • Weight Concern No   • Special Diet No   • Back Care No   • Exercise Yes   • Bike Helmet Yes   • Seat Belt Yes   • Self-Exams Yes   Social History Narrative   • Not on file     Social Determinants of Health     Financial Resource Strain: Not on file   Food Insecurity: Not on file   Transportation Needs: Not on file   Physical Activity: Not on file   Stress: Not on file   Social Connections: Not on file   Intimate Partner Violence: Not on file   Housing Stability: Not on file         EXAMINATION     Physical Exam:   Vitals: /62 (BP Location: Left arm, Patient Position: Sitting, BP Cuff Size: Adult)   Pulse 74   Temp 36.6 °C (97.9 °F) (Temporal)   Ht 1.626 m (5' 4\")   Wt 56.7 kg (125 lb)   SpO2 97%     Constitutional:   Body Habitus: Body mass index is 21.46 kg/m².  Cooperation: Fully cooperates with exam  Appearance: Well-groomed no disheveled    Respiratory-  breathing comfortable on room air, no audible wheezing  Cardiovascular- capillary refills less than 2 seconds. No lower extremity edema is noted.   Psychiatric- alert and oriented ×3. Normal affect.    MSK/NEURO: -   CN  Cranial Nerves:     II - PERRL     III, IV, VI - EOMI     V - Equal sensation bilateral face     VII - Face and smile symmetric     VIII - Hearing normal to finger rubbing bilaterally     IX - Gag not tested     X - Uvula midline     XI - Shoulder shrugs equal     XII - Tongue protrusion midline, normal control     Cervical spine  There are no signs of infection around the injection sites.     full  active range of motion with flexion, " lateral flexion, and rotation bilaterally.   There is full  active range of motion with cervical extension.      Palpation:   Tenderness to palpation throughout the cervical spine: negative bilaterally    Positive for tenderness palpation of the left greater occipital nerve which reproduces the patient's pain.      MEDICAL DECISION MAKING    DATA    Labs:   Lab Results   Component Value Date/Time    SODIUM 139 04/01/2022 09:08 AM    POTASSIUM 4.7 04/01/2022 09:08 AM    CHLORIDE 104 04/01/2022 09:08 AM    CO2 23 04/01/2022 09:08 AM    GLUCOSE 82 04/01/2022 09:08 AM    BUN 15 04/01/2022 09:08 AM    CREATININE 0.81 04/01/2022 09:08 AM        No results found for: PROTHROMBTM, INR     Lab Results   Component Value Date/Time    WBC 7.6 04/01/2022 09:08 AM    RBC 4.72 04/01/2022 09:08 AM    HEMOGLOBIN 14.5 04/01/2022 09:08 AM    HEMATOCRIT 43.8 04/01/2022 09:08 AM    MCV 92.8 04/01/2022 09:08 AM    MCH 30.7 04/01/2022 09:08 AM    MCHC 33.1 (L) 04/01/2022 09:08 AM    MPV 11.9 04/01/2022 09:08 AM    NEUTSPOLYS 58.80 04/01/2022 09:08 AM    LYMPHOCYTES 27.80 04/01/2022 09:08 AM    MONOCYTES 9.90 04/01/2022 09:08 AM    EOSINOPHILS 2.80 04/01/2022 09:08 AM    BASOPHILS 0.40 04/01/2022 09:08 AM        Lab Results   Component Value Date/Time    HBA1C 4.8 04/01/2022 09:08 AM          Imaging:   I personally reviewed following images  MRI cervical spine 10/14/119  At C5-6 there is a disc herniation in the left paracentric region with neuroforaminal stenosis on the left side C5-6.  No significant central canal stenosis.    I reviewed the following radiology reports                                 Results for orders placed during the hospital encounter of 10/14/19   MR-CERVICAL SPINE-W/O    Impression 1. Disc desiccation at C5-6 with annular fissure and posterior broad-based disc protrusion along the left lateral recess and left neuroforamen. Mild narrowing of the left lateral recess and left neuroforamen. No spinal canal narrowing.  The left C6 nerve   root is likely impinged.    2. Unremarkable appearance at other levels.                                                                Results for orders placed in visit on 19   DX-CERVICAL SPINE-2 OR 3 VIEWS    Impression Normal cervical spine series.                                                                                  Results for orders placed during the hospital encounter of 18   DX-WRIST-COMPLETE 3+ LEFT    Impression No acute osseous abnormality.           DIAGNOSIS   Visit Diagnoses     ICD-10-CM   1. Occipital neuralgia of left side  M54.81   2. Cervical disc herniation  M50.20   3. Cervical radiculopathy  M54.12   4. Migraine without aura and without status migrainosus, not intractable  G43.009         ASSESSMENT and PLAN:     Belgica Cesar  1990,   female      Belgica was seen today for follow-up.    Diagnoses and all orders for this visit:    Occipital neuralgia of left side  Comments:  Not controlled, see below.  Orders:  -     dexamethasone (DECADRON) injection 4 mg    Cervical disc herniation  Comments:  Stable, continue home exercise program    Cervical radiculopathy  Comments:  Stable, continue home exercise program    Migraine without aura and without status migrainosus, not intractable  Comments:  Stable, continue current treatment      We decided to proceed with a left greater occipital nerve block.  The patient had excellent results in the past.  We will use ultrasound guidance for safety.    Deferred gabapentin to the patient's OB/GYN.    Follow up: As needed with me    Thank you for allowing me to participate in the care of this patient. If you have any questions please not hesitate to contact me.            Please note that this dictation was created using voice recognition software. I have made every reasonable attempt to correct obvious errors but there may be errors of grammar and content that I may have overlooked prior to finalization  of this note.      Gilles Ordonez MD  Interventional Spine and Sports Physiatry  Physical Medicine and Rehabilitation  [facility]

## 2022-06-22 NOTE — PROCEDURES
Patient: Belgica Irizarry 31 y.o.     Date of Service: 6/22/2022    Physician/s: Gilles Ordonez MD    Pre-operative Diagnosis: LEFT  GREATER occipital neurlagia, Chronic headaches    Post-operative Diagnosis: LEFT  GREATER occipital neurlagia, Chronic headaches    Procedure: LEFT   GREATER occipital nerve injection ultrasound-guided    Description of procedure:    The risks, benefits, and alternatives of the procedure were reviewed and discussed with the patient.  Written informed consent was freely obtained. A pre-procedural time-out was conducted by the physician verifying patient’s identity, procedure to be performed, procedure site and side, and allergy verification. Appropriate equipment was determined to be in place for the procedure.     No sedation was used for this procedure.     A solution was prepared with 5 mL of 1% lidocaine and 1 mL of 4 mg/mL of dexamethasone.    In the office suite the patient was placed in a prone position, and his/her forehead was rested on the table in flexion. The ultrasound probe was placed over the left C2 spinous process and move lateral to show the occipital nerve target. The target nerve/s for injection was marked. The skin was prepped and draped in the usual sterile fashion. A 27g 1.5 inch needle was placed into skin and advanced under ultrasound guidance in an  out-of-plane approach to the GREATER occipital nerve/s. Following negative aspiration, 1.5 mL of the above solution injected to the above side perineurally around the greater occipital nerve. The needle was subsequently removed.         The patient's skin was wiped with a 4x4 gauze, the area was cleansed with alcohol prep, and a bandaid was applied. There were no complications noted.     Gilles Ordonez MD  Interventional Spine and Pain  Physical Medicine and Rehabilitation  [facility]

## 2022-08-02 ENCOUNTER — HOSPITAL ENCOUNTER (OUTPATIENT)
Facility: MEDICAL CENTER | Age: 32
End: 2022-08-02
Attending: OBSTETRICS & GYNECOLOGY
Payer: COMMERCIAL

## 2022-08-02 PROCEDURE — 84144 ASSAY OF PROGESTERONE: CPT

## 2022-08-03 LAB — PROGEST SERPL-MCNC: 23.4 NG/ML

## 2022-08-27 NOTE — ASSESSMENT & PLAN NOTE
-Likely due to dehydration, possibly inflammatory process  -Start IV fluids  -Repeat BMP in the morning   Problem: Discharge Planning  Goal: Discharge to home or other facility with appropriate resources  8/27/2022 0930 by Neil Davis RN  Outcome: Progressing  8/27/2022 0527 by Jyoti Kidd RN  Outcome: Progressing     Problem: Safety - Adult  Goal: Free from fall injury  8/27/2022 0930 by Neil Davis RN  Outcome: Progressing  8/27/2022 0527 by Jyoti Kidd RN  Outcome: Progressing     Problem: Pain  Goal: Verbalizes/displays adequate comfort level or baseline comfort level  8/27/2022 0930 by Neil Davis RN  Outcome: Progressing  Flowsheets (Taken 8/27/2022 0804)  Verbalizes/displays adequate comfort level or baseline comfort level: Encourage patient to monitor pain and request assistance  8/27/2022 0527 by Jyoti Kidd RN  Outcome: Progressing

## 2022-09-01 ENCOUNTER — HOSPITAL ENCOUNTER (OUTPATIENT)
Facility: MEDICAL CENTER | Age: 32
End: 2022-09-01
Attending: OBSTETRICS & GYNECOLOGY
Payer: COMMERCIAL

## 2022-09-01 PROCEDURE — 84144 ASSAY OF PROGESTERONE: CPT

## 2022-09-02 LAB — PROGEST SERPL-MCNC: 25 NG/ML

## 2022-09-30 ENCOUNTER — HOSPITAL ENCOUNTER (OUTPATIENT)
Dept: LAB | Facility: MEDICAL CENTER | Age: 32
End: 2022-09-30
Attending: OBSTETRICS & GYNECOLOGY

## 2022-09-30 ENCOUNTER — HOSPITAL ENCOUNTER (OUTPATIENT)
Facility: MEDICAL CENTER | Age: 32
End: 2022-09-30
Attending: OBSTETRICS & GYNECOLOGY
Payer: COMMERCIAL

## 2022-09-30 PROCEDURE — 84144 ASSAY OF PROGESTERONE: CPT

## 2022-10-01 LAB — PROGEST SERPL-MCNC: 19.8 NG/ML

## 2022-10-15 ENCOUNTER — HOSPITAL ENCOUNTER (OUTPATIENT)
Dept: LAB | Facility: MEDICAL CENTER | Age: 32
End: 2022-10-15
Attending: OBSTETRICS & GYNECOLOGY
Payer: COMMERCIAL

## 2022-10-15 LAB
HBV SURFACE AB SERPL IA-ACNC: 463 MIU/ML (ref 0–10)
HBV SURFACE AG SER QL: NORMAL

## 2022-10-15 PROCEDURE — 87340 HEPATITIS B SURFACE AG IA: CPT

## 2022-10-15 PROCEDURE — 86706 HEP B SURFACE ANTIBODY: CPT

## 2022-10-15 PROCEDURE — 36415 COLL VENOUS BLD VENIPUNCTURE: CPT

## 2022-11-04 ENCOUNTER — HOSPITAL ENCOUNTER (OUTPATIENT)
Facility: MEDICAL CENTER | Age: 32
End: 2022-11-04
Attending: OBSTETRICS & GYNECOLOGY
Payer: COMMERCIAL

## 2022-11-04 LAB — B-HCG SERPL-ACNC: <1 MIU/ML (ref 0–5)

## 2022-11-04 PROCEDURE — 84702 CHORIONIC GONADOTROPIN TEST: CPT

## 2022-12-01 ENCOUNTER — HOSPITAL ENCOUNTER (OUTPATIENT)
Facility: MEDICAL CENTER | Age: 32
End: 2022-12-01
Attending: OBSTETRICS & GYNECOLOGY
Payer: COMMERCIAL

## 2022-12-01 LAB — B-HCG SERPL-ACNC: <1 MIU/ML (ref 0–5)

## 2022-12-01 PROCEDURE — 84702 CHORIONIC GONADOTROPIN TEST: CPT

## 2022-12-17 ENCOUNTER — APPOINTMENT (OUTPATIENT)
Dept: URGENT CARE | Facility: PHYSICIAN GROUP | Age: 32
End: 2022-12-17
Payer: COMMERCIAL

## 2022-12-19 ENCOUNTER — OFFICE VISIT (OUTPATIENT)
Dept: MEDICAL GROUP | Facility: IMAGING CENTER | Age: 32
End: 2022-12-19
Payer: COMMERCIAL

## 2022-12-19 ENCOUNTER — HOSPITAL ENCOUNTER (OUTPATIENT)
Facility: MEDICAL CENTER | Age: 32
End: 2022-12-19
Attending: CLINICAL NURSE SPECIALIST
Payer: COMMERCIAL

## 2022-12-19 VITALS
BODY MASS INDEX: 21.99 KG/M2 | WEIGHT: 128.8 LBS | TEMPERATURE: 98.6 F | DIASTOLIC BLOOD PRESSURE: 60 MMHG | HEIGHT: 64 IN | HEART RATE: 60 BPM | SYSTOLIC BLOOD PRESSURE: 110 MMHG | OXYGEN SATURATION: 98 %

## 2022-12-19 DIAGNOSIS — J02.9 PHARYNGITIS, UNSPECIFIED ETIOLOGY: ICD-10-CM

## 2022-12-19 DIAGNOSIS — M54.50 CHRONIC MIDLINE LOW BACK PAIN WITHOUT SCIATICA: ICD-10-CM

## 2022-12-19 DIAGNOSIS — G89.29 CHRONIC MIDLINE LOW BACK PAIN WITHOUT SCIATICA: ICD-10-CM

## 2022-12-19 DIAGNOSIS — B34.9 ACUTE VIRAL SYNDROME: ICD-10-CM

## 2022-12-19 DIAGNOSIS — N92.6 IRREGULAR MENSTRUATION: ICD-10-CM

## 2022-12-19 LAB
HETEROPH AB SER QL LA: NEGATIVE
INT CON NEG: NORMAL
INT CON NEG: NORMAL
INT CON POS: NORMAL
INT CON POS: NORMAL
S PYO AG THROAT QL: NEGATIVE

## 2022-12-19 PROCEDURE — 87880 STREP A ASSAY W/OPTIC: CPT | Performed by: CLINICAL NURSE SPECIALIST

## 2022-12-19 PROCEDURE — 99214 OFFICE O/P EST MOD 30 MIN: CPT | Performed by: CLINICAL NURSE SPECIALIST

## 2022-12-19 PROCEDURE — 86308 HETEROPHILE ANTIBODY SCREEN: CPT | Performed by: CLINICAL NURSE SPECIALIST

## 2022-12-19 PROCEDURE — U0003 INFECTIOUS AGENT DETECTION BY NUCLEIC ACID (DNA OR RNA); SEVERE ACUTE RESPIRATORY SYNDROME CORONAVIRUS 2 (SARS-COV-2) (CORONAVIRUS DISEASE [COVID-19]), AMPLIFIED PROBE TECHNIQUE, MAKING USE OF HIGH THROUGHPUT TECHNOLOGIES AS DESCRIBED BY CMS-2020-01-R: HCPCS

## 2022-12-19 PROCEDURE — U0005 INFEC AGEN DETEC AMPLI PROBE: HCPCS

## 2022-12-19 ASSESSMENT — PAIN SCALES - GENERAL: PAINLEVEL: NO PAIN

## 2022-12-19 ASSESSMENT — FIBROSIS 4 INDEX: FIB4 SCORE: 0.85

## 2022-12-19 NOTE — PROGRESS NOTES
"Chief Complaint   Patient presents with    Pharyngitis     Swollen tonsils, with a dry cough         HPI: This is a 31 y.o. patient has 3 days of sore throat, dry cough and mild congestion/post nasal drip. Initial mild myalgia and shortness of breath but this has resolved.  No runny nose. Intermittent ear fullness. No abnormal shortness of breath. No nausea vomiting or diarrhea.  No fever and chills.  sick exposures patients. No coughing up blood. No headache.  Patient has taken over-the-counter analgesics and with relief. Working night shift.      ROS:  No fever, nausea, changes in bowel movements or skin rash.      I reviewed the patient's medications, allergies and medical history:  Current Outpatient Medications   Medication Sig Dispense Refill    B Complex Vitamins (VITAMIN B COMPLEX PO)       Coenzyme Q10 (CO Q-10 PO)       Prenatal Vit-Fe Fumarate-FA (PRENATAL VITAMIN PO) Take  by mouth.      VITAMIN D PO Take  by mouth.      EPINEPHrine (EPIPEN) 0.3 MG/0.3ML Solution Auto-injector solution for injection       VITAMIN E PO Take 1 Cap by mouth every day.      ondansetron (ZOFRAN ODT) 4 MG TABLET DISPERSIBLE Take 1 Tablet by mouth every four hours as needed. (Patient not taking: Reported on 3/31/2022) 10 Tablet 0    MAGNESIUM PO Take 1 Cap by mouth every day. (Patient not taking: Reported on 3/31/2022)       No current facility-administered medications for this visit.     Pcn [penicillins] and Clindamycin  Past Medical History:   Diagnosis Date    Acute kidney injury (HCC) 2/11/2020    Anxiety     Asthma     Bulimia nervosa in full remission 5/27/2021    Migraines         EXAM:  /60 (BP Location: Right arm, Patient Position: Sitting, BP Cuff Size: Adult)   Pulse 60   Temp 37 °C (98.6 °F) (Temporal)   Ht 1.626 m (5' 4\")   Wt 58.4 kg (128 lb 12.8 oz)   SpO2 98%   General: NAD, non-toxic appearance.  Eyes: PERRL, conjunctiva slightly injected, no photophobia or eye discharge.  Ears: Normal pinnae. TM's " pearly gray with good landmarks bilaterally.  Sinuses: Non-tender over maxillary and frontal sinuses.  Throat: Erythematous injection withot enlarged tonsils. No exudates.   Neck: Supple, with shotty anterior cervical lymphadenopathy.  Lungs: Good air entry bilaterally, clear to auscultation. No wheeze, rhonchi or crackles. Normal respiratory effort.  Heart: Regular rate without murmur.  Abdomen: Soft and tender RUQ and LUQ.   Skin: Warm and dry. No rash.     Results for orders placed or performed during the hospital encounter of 12/01/22   HCG QUANTITATIVE   Result Value Ref Range    Bhcg <1.0 0.0 - 5.0 mIU/mL         ASSESSMENT and PLAN:  1. Pharyngitis, unspecified etiology  Acute. Negative point of care testing.  Symptoms improving.  Belgica will notify me if symptoms worsen.    - POCT Rapid Strep A-NEGATIVE  - POCT Mononucleosis (mono)-NEGATIVE  - COVID/SARS CoV-2 PCR; Future    2. Irregular menstruation  Belgica has been trying to get pregnant for over a year.  She has a history of irregular menstruation and has had multiple failed IUI's.  Referral to acupuncture placed.    - Referral for Acupuncture    3. Chronic midline low back pain without sciatica  Chronic, intermittent.  Ciara reports she has intermittent midline low back pain.  Referral to acupuncture placed.    - Referral for Acupuncture    4. Acute viral syndrome  Acute and improving.  Ciara symptoms are most consistent with an acute viral syndrome.  She reports enlarged tonsils but today they are not.  She does have some erythema in the back of her throat but no exudate.    For nasal congestion, take hot, steamy showers and use saline nasal spray or a neti pot as needed to clear congestion.      To soothe the throat, drink warm, herbal tea such as Throat Coat or ginger with honey and lemon.     For pain, take ibuprofen or Tylenol as needed according to package instructions. Do not take more than 3000mg Tylenol or 1200mg ibuprofen in 24  hours.      For cough, take Mucinex as needed during the day and use throat lozenges such as Ricola.  Use dextromethorphan for cough only at night to allow the body to expel the pathogen during the day.      Hydrate well.  Take 5-10 deep breaths intermittently throughout the day and move the body as tolerated to aid in respiration.      Return if symptoms worsen or fail to improve, for With test results.     The patient verbalized agreement and understanding of the current plan. All questions and concerns were addressed at the time of visit.    This note was dictated using Dragon Software.  I have made every reasonable attempt to correct errors, but errors of grammar and content may still exist.

## 2022-12-19 NOTE — ASSESSMENT & PLAN NOTE
Intermittent midline low back pain.  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  None currently.

## 2022-12-20 DIAGNOSIS — J02.9 PHARYNGITIS, UNSPECIFIED ETIOLOGY: ICD-10-CM

## 2022-12-20 LAB
SARS-COV-2 RNA RESP QL NAA+PROBE: NOTDETECTED
SPECIMEN SOURCE: NORMAL

## 2022-12-21 DIAGNOSIS — J01.90 ACUTE NON-RECURRENT SINUSITIS, UNSPECIFIED LOCATION: ICD-10-CM

## 2022-12-21 RX ORDER — CEFDINIR 300 MG/1
300 CAPSULE ORAL 2 TIMES DAILY
Qty: 14 CAPSULE | Refills: 0 | Status: SHIPPED
Start: 2022-12-21 | End: 2023-02-27

## 2023-02-27 ENCOUNTER — TELEMEDICINE (OUTPATIENT)
Dept: MEDICAL GROUP | Facility: IMAGING CENTER | Age: 33
End: 2023-02-27
Payer: COMMERCIAL

## 2023-02-27 VITALS — HEIGHT: 64 IN | BODY MASS INDEX: 21.68 KG/M2 | WEIGHT: 127 LBS | HEART RATE: 84 BPM

## 2023-02-27 DIAGNOSIS — F32.9 REACTIVE DEPRESSION: ICD-10-CM

## 2023-02-27 PROCEDURE — 96127 BRIEF EMOTIONAL/BEHAV ASSMT: CPT | Mod: 95 | Performed by: CLINICAL NURSE SPECIALIST

## 2023-02-27 PROCEDURE — 99214 OFFICE O/P EST MOD 30 MIN: CPT | Mod: 95,25 | Performed by: CLINICAL NURSE SPECIALIST

## 2023-02-27 RX ORDER — BUPROPION HYDROCHLORIDE 150 MG/1
150 TABLET ORAL EVERY MORNING
Qty: 30 TABLET | Refills: 1 | Status: SHIPPED | OUTPATIENT
Start: 2023-02-27 | End: 2023-04-18 | Stop reason: SDUPTHER

## 2023-02-27 ASSESSMENT — ANXIETY QUESTIONNAIRES
6. BECOMING EASILY ANNOYED OR IRRITABLE: NOT AT ALL
5. BEING SO RESTLESS THAT IT IS HARD TO SIT STILL: NOT AT ALL
1. FEELING NERVOUS, ANXIOUS, OR ON EDGE: SEVERAL DAYS
4. TROUBLE RELAXING: SEVERAL DAYS
7. FEELING AFRAID AS IF SOMETHING AWFUL MIGHT HAPPEN: NOT AT ALL
2. NOT BEING ABLE TO STOP OR CONTROL WORRYING: NOT AT ALL

## 2023-02-27 ASSESSMENT — PATIENT HEALTH QUESTIONNAIRE - PHQ9
5. POOR APPETITE OR OVEREATING: 0 - NOT AT ALL
SUM OF ALL RESPONSES TO PHQ QUESTIONS 1-9: 9
CLINICAL INTERPRETATION OF PHQ2 SCORE: 4

## 2023-02-27 ASSESSMENT — PAIN SCALES - GENERAL: PAINLEVEL: NO PAIN

## 2023-02-27 ASSESSMENT — FIBROSIS 4 INDEX: FIB4 SCORE: 0.88

## 2023-02-27 NOTE — ASSESSMENT & PLAN NOTE
Stressed with school and trying to get pregnant and she has multiple close contacts who are pregnant.  She is feeling depressed recently related to these issues. Increased frequency of intrusive thoughts but no suicidal thoughts.  Had patient who committed suicide and had anxiety related to this.

## 2023-02-27 NOTE — PROGRESS NOTES
Telemedicine: Established Patient   This evaluation was conducted via Zoom using secure and encrypted videoconferencing technology. The patient was in their home in the [facility].    The patient's identity was confirmed and verbal consent was obtained for this virtual visit.    Subjective:   CC:   Chief Complaint   Patient presents with    Medication Management       HPI:  Reactive depression  Stressed with school and trying to get pregnant and she has multiple close contacts who are pregnant.  She is feeling depressed recently related to these issues. Increased frequency of intrusive thoughts but no suicidal thoughts.  Had patient who committed suicide and had anxiety related to this.        ROS  See HPI    Allergies   Allergen Reactions    Pcn [Penicillins] Rash     Pt reports that she gets a full body rash    Clindamycin Rash     Pt reports that she gets a full body rash       Current medicines (including changes today)  Current Outpatient Medications   Medication Sig Dispense Refill    buPROPion (WELLBUTRIN XL) 150 MG XL tablet Take 1 Tablet by mouth every morning. 30 Tablet 1    B Complex Vitamins (VITAMIN B COMPLEX PO)       Coenzyme Q10 (CO Q-10 PO)       Prenatal Vit-Fe Fumarate-FA (PRENATAL VITAMIN PO) Take  by mouth.      VITAMIN D PO Take  by mouth.      EPINEPHrine (EPIPEN) 0.3 MG/0.3ML Solution Auto-injector solution for injection       VITAMIN E PO Take 1 Cap by mouth every day.       No current facility-administered medications for this visit.       Patient Active Problem List    Diagnosis Date Noted    Reactive depression 02/27/2023    Chronic midline low back pain without sciatica 12/19/2022    Irregular menstruation 03/31/2022    Seborrheic keratoses, inflamed 05/27/2021    Gastroesophageal reflux disease with esophagitis 05/27/2021    Bulimia nervosa in full remission 05/27/2021    Anxiety 06/01/2020    Leukocytosis 02/11/2020    Migraine without aura and without status migrainosus, not  intractable 03/15/2018    History of stomach ulcers 03/15/2018         Family History   Problem Relation Age of Onset    Hypertension Mother     No Known Problems Father        She  has a past medical history of Acute kidney injury (HCC) (2/11/2020), Anxiety, Asthma, Bulimia nervosa in full remission (5/27/2021), Migraines, and Reactive depression (2/27/2023).    She has no past medical history of Addisons disease (HCC), Adrenal disorder (HCC), Allergy, Anemia, Arrhythmia, Arthritis, Blood transfusion without reported diagnosis, CAD (coronary artery disease), Cancer (HCC), Cataract, Chronic obstructive pulmonary disease (HCC), Clotting disorder (HCC), Congestive heart failure (HCC), Cushings syndrome (HCC), Diabetes (HCC), Diabetic neuropathy (HCC), GERD (gastroesophageal reflux disease), Glaucoma, Goiter, Head ache, Heart attack (HCC), Heart murmur, HIV (human immunodeficiency virus infection) (HCC), Hyperlipidemia, Hypertension, IBD (inflammatory bowel disease), Infectious disease, Liver disease, Meningitis, Muscle disorder, Osteoporosis, Parathyroid disorder (HCC), Pituitary disease (HCC), Pulmonary emphysema (HCC), Seizure (HCC), Seizure disorder (HCC), Sickle cell disease (HCC), Stroke (HCC), Substance abuse (HCC), Thyroid disease, Tuberculosis, or Urinary tract infection.  She  has a past surgical history that includes pr inj cerv/thorac,w/ imaging (N/A, 10/27/2021).        3/31/2022     8:00 AM 6/22/2022     1:00 PM 2/27/2023     3:40 PM   Depression Screen (PHQ-2/PHQ-9)   PHQ-2 Total Score 0 0 4   PHQ-9 Total Score   9       Interpretation of PHQ-9 Total Score   Score Severity   1-4 No Depression   5-9 Mild Depression   10-14 Moderate Depression   15-19 Moderately Severe Depression   20-27 Severe Depression    MARCELINO-7 Questionnaire    Feeling nervous, anxious, or on edge: Several days  Not being able to sop or control worrying: Not at all  Worrying too much about different things:    Trouble relaxing: Several  days  Being so restless that it's hard to sit still: Not at all  Becoming easily annoyed or irritable: Not at all  Feeling afraid as if something awful might happen: Not at all  Total:      Interpretation of MARCELINO 7 Total Score   Score Severity :  0-4 No Anxiety   5-9 Mild Anxiety  10-14 Moderate Anxiety  15-21 Severe Anxiety     Objective:     Physical Exam     Vitals obtained by patient:  Vitals:    02/27/23 1537   Pulse: 84        Constitutional: Alert, no distress, well-groomed.  Skin: No rashes in visible areas.  Eye: Round. Conjunctiva clear, lids normal. No icterus.   ENMT: Lips pink without lesions, good dentition, moist mucous membranes. Phonation normal.  Neck: No masses, no thyromegaly. Moves freely without pain.  CV: Pulse as reported by patient  Respiratory: Unlabored respiratory effort, no cough or audible wheeze  Psych: Alert and oriented x4, normal affect and mood.     Assessment and Plan:   The following treatment plan was discussed:     1. Reactive depression  Recurrent issue with recent exacerbation.  She called her old therapist and psychiatrist and is awaiting call back.  She tried one through [facility] but she did not mesh well with the person.  She has other referrals she can try.  Will restart Wellbutrin. Discussed s/e including hypertension and tachycardia.  Pregnancy category B.     RESTART bupropion 150mg XL and check in in 2-4 weeks    - buPROPion (WELLBUTRIN XL) 150 MG XL tablet; Take 1 Tablet by mouth every morning.  Dispense: 30 Tablet; Refill: 1    Follow-up: Return in about 4 weeks (around 3/27/2023), or if symptoms worsen or fail to improve.

## 2023-03-27 ENCOUNTER — TELEMEDICINE (OUTPATIENT)
Dept: MEDICAL GROUP | Facility: IMAGING CENTER | Age: 33
End: 2023-03-27
Payer: COMMERCIAL

## 2023-03-27 VITALS
SYSTOLIC BLOOD PRESSURE: 118 MMHG | HEIGHT: 64 IN | BODY MASS INDEX: 21.51 KG/M2 | HEART RATE: 83 BPM | WEIGHT: 126 LBS | DIASTOLIC BLOOD PRESSURE: 63 MMHG

## 2023-03-27 DIAGNOSIS — F32.9 REACTIVE DEPRESSION: ICD-10-CM

## 2023-03-27 DIAGNOSIS — F41.9 ANXIETY: ICD-10-CM

## 2023-03-27 PROCEDURE — 99214 OFFICE O/P EST MOD 30 MIN: CPT | Mod: 95 | Performed by: CLINICAL NURSE SPECIALIST

## 2023-03-27 ASSESSMENT — PAIN SCALES - GENERAL: PAINLEVEL: NO PAIN

## 2023-03-27 ASSESSMENT — FIBROSIS 4 INDEX: FIB4 SCORE: 0.88

## 2023-03-27 NOTE — ASSESSMENT & PLAN NOTE
Taking Wellbutrin 150mg XL. Overall feeling better bur still has days of feeling unmotivated.  She is working on exercising regularly.

## 2023-03-27 NOTE — PROGRESS NOTES
Telemedicine: Established Patient   This evaluation was conducted via Zoom using secure and encrypted videoconferencing technology. The patient was in their home in the [facility].    The patient's identity was confirmed and verbal consent was obtained for this virtual visit.    Subjective:   CC:   Chief Complaint   Patient presents with    Follow-Up     On medications        HPI:  Reactive depression  Taking Wellbutrin 150mg XL. Overall feeling better bur still has days of feeling unmotivated.  She is working on exercising regularly.      Anxiety  Wellbutrin 150mg XL helping with anxiety.  Saw Sarah for acupuncture.  No panic attacks.       ROS  See HPI    Allergies   Allergen Reactions    Pcn [Penicillins] Rash     Pt reports that she gets a full body rash    Clindamycin Rash     Pt reports that she gets a full body rash       Current medicines (including changes today)  Current Outpatient Medications   Medication Sig Dispense Refill    buPROPion (WELLBUTRIN XL) 150 MG XL tablet Take 1 Tablet by mouth every morning. 30 Tablet 1    B Complex Vitamins (VITAMIN B COMPLEX PO)       Coenzyme Q10 (CO Q-10 PO)       Prenatal Vit-Fe Fumarate-FA (PRENATAL VITAMIN PO) Take  by mouth.      VITAMIN D PO Take  by mouth.      EPINEPHrine (EPIPEN) 0.3 MG/0.3ML Solution Auto-injector solution for injection       VITAMIN E PO Take 1 Cap by mouth every day.       No current facility-administered medications for this visit.       Patient Active Problem List    Diagnosis Date Noted    Reactive depression 02/27/2023    Chronic midline low back pain without sciatica 12/19/2022    Irregular menstruation 03/31/2022    Seborrheic keratoses, inflamed 05/27/2021    Gastroesophageal reflux disease with esophagitis 05/27/2021    Bulimia nervosa in full remission 05/27/2021    Anxiety 06/01/2020    Leukocytosis 02/11/2020    Migraine without aura and without status migrainosus, not intractable 03/15/2018    History of stomach ulcers  03/15/2018         Family History   Problem Relation Age of Onset    Hypertension Mother     No Known Problems Father        She  has a past medical history of Acute kidney injury (HCC) (2/11/2020), Anxiety, Asthma, Bulimia nervosa in full remission (5/27/2021), Migraines, and Reactive depression (2/27/2023).    She has no past medical history of Addisons disease (HCC), Adrenal disorder (HCC), Allergy, Anemia, Arrhythmia, Arthritis, Blood transfusion without reported diagnosis, CAD (coronary artery disease), Cancer (HCC), Cataract, Chronic obstructive pulmonary disease (HCC), Clotting disorder (HCC), Congestive heart failure (HCC), Cushings syndrome (HCC), Diabetes (HCC), Diabetic neuropathy (HCC), GERD (gastroesophageal reflux disease), Glaucoma, Goiter, Head ache, Heart attack (HCC), Heart murmur, HIV (human immunodeficiency virus infection) (HCC), Hyperlipidemia, Hypertension, IBD (inflammatory bowel disease), Infectious disease, Liver disease, Meningitis, Muscle disorder, Osteoporosis, Parathyroid disorder (HCC), Pituitary disease (HCC), Pulmonary emphysema (HCC), Seizure (HCC), Seizure disorder (HCC), Sickle cell disease (HCC), Stroke (HCC), Substance abuse (HCC), Thyroid disease, Tuberculosis, or Urinary tract infection.  She  has a past surgical history that includes pr inj cerv/thorac,w/ imaging (N/A, 10/27/2021).       Objective:     Physical Exam     Vitals obtained by patient:  Vitals:    03/27/23 1037   BP: 118/63   Pulse: 83          Constitutional: Alert, no distress, well-groomed.  Skin: No rashes in visible areas.  Eye: Round. Conjunctiva clear, lids normal. No icterus.   ENMT: Lips pink without lesions, good dentition, moist mucous membranes. Phonation normal.  Neck: No masses, no thyromegaly. Moves freely without pain.  CV: Pulse as reported by patient  Respiratory: Unlabored respiratory effort, no cough or audible wheeze  Psych: Alert and oriented x4, normal affect and mood.     Assessment and  Plan:   The following treatment plan was discussed:     1. Reactive depression  Chronic, improved.  Continue to work on healthy lifestyle choices. Will continue current dose of Wellbutrin.    CONTINUE Wellbutrin 150mgXL daily    2. Anxiety  Chronic, improved.  Continue to work on healthy lifestyle choices. Will continue current dose of Wellbutrin.    CONTINUE Wellbutrin 150mgXL daily    Follow-up: Return if symptoms worsen or fail to improve, for Med check.

## 2023-03-31 ENCOUNTER — PATIENT MESSAGE (OUTPATIENT)
Dept: HEALTH INFORMATION MANAGEMENT | Facility: OTHER | Age: 33
End: 2023-03-31

## 2023-04-12 ENCOUNTER — APPOINTMENT (OUTPATIENT)
Dept: DERMATOLOGY | Facility: IMAGING CENTER | Age: 33
End: 2023-04-12
Payer: COMMERCIAL

## 2023-04-12 ENCOUNTER — EH NON-PROVIDER (OUTPATIENT)
Dept: OCCUPATIONAL MEDICINE | Facility: CLINIC | Age: 33
End: 2023-04-12

## 2023-04-12 DIAGNOSIS — Z02.89 ENCOUNTER FOR OCCUPATIONAL HEALTH ASSESSMENT: ICD-10-CM

## 2023-04-12 PROCEDURE — 94375 RESPIRATORY FLOW VOLUME LOOP: CPT | Performed by: PREVENTIVE MEDICINE

## 2023-04-17 SDOH — ECONOMIC STABILITY: TRANSPORTATION INSECURITY
IN THE PAST 12 MONTHS, HAS THE LACK OF TRANSPORTATION KEPT YOU FROM MEDICAL APPOINTMENTS OR FROM GETTING MEDICATIONS?: NO

## 2023-04-17 SDOH — ECONOMIC STABILITY: HOUSING INSECURITY: IN THE LAST 12 MONTHS, HOW MANY PLACES HAVE YOU LIVED?: 1

## 2023-04-17 SDOH — ECONOMIC STABILITY: INCOME INSECURITY: IN THE LAST 12 MONTHS, WAS THERE A TIME WHEN YOU WERE NOT ABLE TO PAY THE MORTGAGE OR RENT ON TIME?: NO

## 2023-04-17 SDOH — ECONOMIC STABILITY: FOOD INSECURITY: WITHIN THE PAST 12 MONTHS, THE FOOD YOU BOUGHT JUST DIDN'T LAST AND YOU DIDN'T HAVE MONEY TO GET MORE.: NEVER TRUE

## 2023-04-17 SDOH — ECONOMIC STABILITY: HOUSING INSECURITY
IN THE LAST 12 MONTHS, WAS THERE A TIME WHEN YOU DID NOT HAVE A STEADY PLACE TO SLEEP OR SLEPT IN A SHELTER (INCLUDING NOW)?: NO

## 2023-04-17 SDOH — ECONOMIC STABILITY: FOOD INSECURITY: WITHIN THE PAST 12 MONTHS, YOU WORRIED THAT YOUR FOOD WOULD RUN OUT BEFORE YOU GOT MONEY TO BUY MORE.: NEVER TRUE

## 2023-04-17 SDOH — ECONOMIC STABILITY: TRANSPORTATION INSECURITY
IN THE PAST 12 MONTHS, HAS LACK OF TRANSPORTATION KEPT YOU FROM MEETINGS, WORK, OR FROM GETTING THINGS NEEDED FOR DAILY LIVING?: NO

## 2023-04-17 SDOH — HEALTH STABILITY: PHYSICAL HEALTH: ON AVERAGE, HOW MANY DAYS PER WEEK DO YOU ENGAGE IN MODERATE TO STRENUOUS EXERCISE (LIKE A BRISK WALK)?: 5 DAYS

## 2023-04-17 SDOH — HEALTH STABILITY: PHYSICAL HEALTH: ON AVERAGE, HOW MANY MINUTES DO YOU ENGAGE IN EXERCISE AT THIS LEVEL?: 30 MIN

## 2023-04-17 SDOH — ECONOMIC STABILITY: TRANSPORTATION INSECURITY
IN THE PAST 12 MONTHS, HAS LACK OF RELIABLE TRANSPORTATION KEPT YOU FROM MEDICAL APPOINTMENTS, MEETINGS, WORK OR FROM GETTING THINGS NEEDED FOR DAILY LIVING?: NO

## 2023-04-17 SDOH — HEALTH STABILITY: MENTAL HEALTH
STRESS IS WHEN SOMEONE FEELS TENSE, NERVOUS, ANXIOUS, OR CAN'T SLEEP AT NIGHT BECAUSE THEIR MIND IS TROUBLED. HOW STRESSED ARE YOU?: TO SOME EXTENT

## 2023-04-17 SDOH — ECONOMIC STABILITY: INCOME INSECURITY: HOW HARD IS IT FOR YOU TO PAY FOR THE VERY BASICS LIKE FOOD, HOUSING, MEDICAL CARE, AND HEATING?: NOT HARD AT ALL

## 2023-04-17 ASSESSMENT — LIFESTYLE VARIABLES
HOW OFTEN DO YOU HAVE A DRINK CONTAINING ALCOHOL: NEVER
SKIP TO QUESTIONS 9-10: 1
HOW OFTEN DO YOU HAVE SIX OR MORE DRINKS ON ONE OCCASION: NEVER
AUDIT-C TOTAL SCORE: 0
HOW MANY STANDARD DRINKS CONTAINING ALCOHOL DO YOU HAVE ON A TYPICAL DAY: PATIENT DOES NOT DRINK

## 2023-04-17 ASSESSMENT — SOCIAL DETERMINANTS OF HEALTH (SDOH)
DO YOU BELONG TO ANY CLUBS OR ORGANIZATIONS SUCH AS CHURCH GROUPS UNIONS, FRATERNAL OR ATHLETIC GROUPS, OR SCHOOL GROUPS?: NO
IN A TYPICAL WEEK, HOW MANY TIMES DO YOU TALK ON THE PHONE WITH FAMILY, FRIENDS, OR NEIGHBORS?: MORE THAN THREE TIMES A WEEK
HOW OFTEN DO YOU GET TOGETHER WITH FRIENDS OR RELATIVES?: THREE TIMES A WEEK
HOW HARD IS IT FOR YOU TO PAY FOR THE VERY BASICS LIKE FOOD, HOUSING, MEDICAL CARE, AND HEATING?: NOT HARD AT ALL
HOW OFTEN DO YOU ATTENT MEETINGS OF THE CLUB OR ORGANIZATION YOU BELONG TO?: PATIENT DECLINED
HOW MANY DRINKS CONTAINING ALCOHOL DO YOU HAVE ON A TYPICAL DAY WHEN YOU ARE DRINKING: PATIENT DOES NOT DRINK
WITHIN THE PAST 12 MONTHS, YOU WORRIED THAT YOUR FOOD WOULD RUN OUT BEFORE YOU GOT THE MONEY TO BUY MORE: NEVER TRUE
DO YOU BELONG TO ANY CLUBS OR ORGANIZATIONS SUCH AS CHURCH GROUPS UNIONS, FRATERNAL OR ATHLETIC GROUPS, OR SCHOOL GROUPS?: NO
HOW OFTEN DO YOU HAVE A DRINK CONTAINING ALCOHOL: NEVER
HOW OFTEN DO YOU HAVE SIX OR MORE DRINKS ON ONE OCCASION: NEVER
HOW OFTEN DO YOU ATTEND CHURCH OR RELIGIOUS SERVICES?: MORE THAN 4 TIMES PER YEAR
HOW OFTEN DO YOU GET TOGETHER WITH FRIENDS OR RELATIVES?: THREE TIMES A WEEK
HOW OFTEN DO YOU ATTEND CHURCH OR RELIGIOUS SERVICES?: MORE THAN 4 TIMES PER YEAR
HOW OFTEN DO YOU ATTENT MEETINGS OF THE CLUB OR ORGANIZATION YOU BELONG TO?: PATIENT DECLINED
IN A TYPICAL WEEK, HOW MANY TIMES DO YOU TALK ON THE PHONE WITH FAMILY, FRIENDS, OR NEIGHBORS?: MORE THAN THREE TIMES A WEEK

## 2023-04-18 ENCOUNTER — OFFICE VISIT (OUTPATIENT)
Dept: MEDICAL GROUP | Facility: MEDICAL CENTER | Age: 33
End: 2023-04-18
Payer: COMMERCIAL

## 2023-04-18 VITALS
WEIGHT: 127 LBS | TEMPERATURE: 98.2 F | BODY MASS INDEX: 21.68 KG/M2 | HEART RATE: 72 BPM | SYSTOLIC BLOOD PRESSURE: 96 MMHG | OXYGEN SATURATION: 99 % | HEIGHT: 64 IN | DIASTOLIC BLOOD PRESSURE: 52 MMHG

## 2023-04-18 DIAGNOSIS — H81.10 BENIGN PAROXYSMAL POSITIONAL VERTIGO, UNSPECIFIED LATERALITY: ICD-10-CM

## 2023-04-18 DIAGNOSIS — F32.9 REACTIVE DEPRESSION: ICD-10-CM

## 2023-04-18 PROBLEM — D72.829 LEUKOCYTOSIS: Status: RESOLVED | Noted: 2020-02-11 | Resolved: 2023-04-18

## 2023-04-18 PROCEDURE — 99213 OFFICE O/P EST LOW 20 MIN: CPT | Performed by: NURSE PRACTITIONER

## 2023-04-18 RX ORDER — BUPROPION HYDROCHLORIDE 150 MG/1
150 TABLET ORAL EVERY MORNING
Qty: 90 TABLET | Refills: 1 | Status: SHIPPED
Start: 2023-04-18 | End: 2023-11-21

## 2023-04-18 ASSESSMENT — FIBROSIS 4 INDEX: FIB4 SCORE: 0.88

## 2023-04-18 NOTE — PROGRESS NOTES
Chief Complaint   Patient presents with    Establish Care     Prior PCP Reinaldo Lindo Jas Irizarry is a 32 y.o. female here to establish care and to discuss the evaluation and management of:    Working in the PICU    Currently taking Wellbutrin 150 mg for her reactive depression.  Feeling okay at this time.   No SI. Not followed by Counseling.   Tinnitus and dizziness with head turning wondering if this is secondary to Wellbutrin.  Cannot member the started afterwards.  No balance issues, nausea or vomiting.  Some nasal congestion.  No ear pain.  No focal weakness, vision changes      Followed by Dr. Leal at [facility]      ROS: SEE ABOVE        Current Outpatient Medications:     buPROPion (WELLBUTRIN XL) 150 MG XL tablet, Take 1 Tablet by mouth every morning., Disp: 90 Tablet, Rfl: 1    B Complex Vitamins (VITAMIN B COMPLEX PO), , Disp: , Rfl:     Coenzyme Q10 (CO Q-10 PO), , Disp: , Rfl:     Prenatal Vit-Fe Fumarate-FA (PRENATAL VITAMIN PO), Take  by mouth., Disp: , Rfl:     VITAMIN D PO, Take  by mouth., Disp: , Rfl:     EPINEPHrine (EPIPEN) 0.3 MG/0.3ML Solution Auto-injector solution for injection, , Disp: , Rfl:     VITAMIN E PO, Take 1 Cap by mouth every day., Disp: , Rfl:     Allergies   Allergen Reactions    Pcn [Penicillins] Rash     Pt reports that she gets a full body rash    Clindamycin Rash     Pt reports that she gets a full body rash       Past Medical History:   Diagnosis Date    Acute kidney injury (HCC) 02/11/2020    Anxiety     Asthma     Bulimia nervosa in full remission 05/27/2021    GERD (gastroesophageal reflux disease)     Migraines     Reactive depression 02/27/2023     Past Surgical History:   Procedure Laterality Date    KS INJ CERV/THORAC,W/ IMAGING N/A 10/27/2021    Procedure: C7-T1 interlaminar epidural steroid injection;  Surgeon: Gilles Ordonez M.D.;  [facility];  Service: Pain Management     Family History   Problem Relation Age of Onset     Hypertension Mother     Cancer Father         Bladder cancer    Psychiatric Illness Maternal Grandfather         Bipolar    Cancer Paternal Grandfather         Melanoma    Heart Disease Paternal Grandfather     Stroke Paternal Grandfather     Lung Disease Paternal Grandmother     Arterial Aneurysm Paternal Grandmother     Psychiatric Illness Maternal Aunt         Bipolar    Arterial Aneurysm Paternal Uncle     Drug abuse Paternal Uncle      Social History     Socioeconomic History    Marital status:      Spouse name: Not on file    Number of children: Not on file    Years of education: Not on file    Highest education level: Bachelor's degree (e.g., BA, AB, BS)   Occupational History    Not on file   Tobacco Use    Smoking status: Never    Smokeless tobacco: Never   Vaping Use    Vaping Use: Never used   Substance and Sexual Activity    Alcohol use: Not Currently     Comment: occasional    Drug use: No    Sexual activity: Yes     Partners: Male     Birth control/protection: None   Other Topics Concern     Service No    Blood Transfusions No    Caffeine Concern No    Occupational Exposure No    Hobby Hazards No    Sleep Concern No    Stress Concern No    Weight Concern No    Special Diet No    Back Care No    Exercise Yes    Bike Helmet Yes    Seat Belt Yes    Self-Exams Yes   Social History Narrative    Not on file     Social Determinants of Health     Financial Resource Strain: Low Risk     Difficulty of Paying Living Expenses: Not hard at all   Food Insecurity: No Food Insecurity    Worried About Running Out of Food in the Last Year: Never true    Ran Out of Food in the Last Year: Never true   Transportation Needs: No Transportation Needs    Lack of Transportation (Medical): No    Lack of Transportation (Non-Medical): No   Physical Activity: Sufficiently Active    Days of Exercise per Week: 5 days    Minutes of Exercise per Session: 30 min   Stress: Stress Concern Present    Feeling of Stress :  "To some extent   Social Connections: Moderately Integrated    Frequency of Communication with Friends and Family: More than three times a week    Frequency of Social Gatherings with Friends and Family: Three times a week    Attends Restorationist Services: More than 4 times per year    Active Member of Clubs or Organizations: No    Attends Club or Organization Meetings: Patient refused    Marital Status:    Intimate Partner Violence: Not on file   Housing Stability: Low Risk     Unable to Pay for Housing in the Last Year: No    Number of Places Lived in the Last Year: 1    Unstable Housing in the Last Year: No       Objective:     Vitals: BP 96/52 (BP Location: Right arm, Patient Position: Sitting, BP Cuff Size: Adult)   Pulse 72   Temp 36.8 °C (98.2 °F) (Temporal)   Ht 1.626 m (5' 4\")   Wt 57.6 kg (127 lb)   LMP 2023 (Exact Date)   SpO2 99%   BMI 21.80 kg/m²      General: Alert, pleasant, NAD  HEENT:  Normocephalic.  Neck supple.  No thyromegaly or masses palpated. No cervical or supraclavicular lymphadenopathy.  Heart:  Regular rate and rhythm.  S1 and S2 normal.  No murmurs appreciated.    Respiratory:  Normal respiratory effort.  Clear to auscultation bilaterally.    Skin:  Warm, dry, no rashes  Musculoskeletal:  Gait is normal.  Moves all extremities well.  Extremities:   No leg edema.  Neurological: No tremors  Psych:  Affect/mood is normal, judgement is good, memory is intact, grooming is appropriate.    Assessment and Plan.     32 y.o. female to establish care and discuss the followin. Reactive depression  Chronic problem, stable on current regimen.  Continue Wellbutrin 150 XL.  - buPROPion (WELLBUTRIN XL) 150 MG XL tablet; Take 1 Tablet by mouth every morning.  Dispense: 90 Tablet; Refill: 1    2. Benign paroxysmal positional vertigo, unspecified laterality  Acute, stable.  Most likely self-limiting although possibly related to Wellbutrin-this was recently started.  Symptoms are not " severe nor impacting her ADLs or social life at this time.  No focal weakness, nausea, vomiting, balance issues or symptoms of the room spinning.  No headache.  Recommend adequate hydration, reduce excessive caffeine, sodium consumption.    Return if symptoms worsen or fail to improve.        Clair LLOYD.

## 2023-04-26 ENCOUNTER — OFFICE VISIT (OUTPATIENT)
Dept: DERMATOLOGY | Facility: IMAGING CENTER | Age: 33
End: 2023-04-26
Payer: COMMERCIAL

## 2023-04-26 DIAGNOSIS — L81.4 LENTIGINES: ICD-10-CM

## 2023-04-26 DIAGNOSIS — D22.9 MULTIPLE NEVI: ICD-10-CM

## 2023-04-26 DIAGNOSIS — L82.1 SK (SEBORRHEIC KERATOSIS): ICD-10-CM

## 2023-04-26 DIAGNOSIS — Z12.83 SKIN CANCER SCREENING: ICD-10-CM

## 2023-04-26 DIAGNOSIS — D18.01 CHERRY ANGIOMA: ICD-10-CM

## 2023-04-26 PROCEDURE — 99212 OFFICE O/P EST SF 10 MIN: CPT | Performed by: NURSE PRACTITIONER

## 2023-04-26 NOTE — PROGRESS NOTES
DERMATOLOGY NOTE  NEW VISIT       Chief complaint: Establish Care (CALLIE)     Denies new, growing, changing, itching or bleeding skin lesions today.     History of skin cancer: No  History of precancers/actinic keratoses: No  History of biopsies:Yes, Details: upper rt back in 2010  History of blistering/severe sunburns:No  Family history of skin cancer:Yes, Details: melanoma, paternal grandfather and maternal great grandmother  Family history of atypical moles:No      Allergies   Allergen Reactions    Pcn [Penicillins] Rash     Pt reports that she gets a full body rash    Clindamycin Rash     Pt reports that she gets a full body rash        MEDICATIONS:  Medications relevant to specialty reviewed.     REVIEW OF SYSTEMS:   Positive for skin (see HPI)  Negative for fevers and chills       EXAM:  LMP 03/20/2023 (Exact Date)   Constitutional: Well-developed, well-nourished, and in no distress.     A total body skin exam was performed excluding the genitals per patient preference and including the following areas: head (including face), neck, chest, abdomen, groin/buttocks, back, bilateral upper extremities, and bilateral lower extremities with the following pertinent findings listed below and/or in assessment/plan.     -sun exposed skin of trunk and b/l upper, lower extremities and face with scattered clinically benign light brown reticulated macules all of which were morphologically similar and none of which were suspicious for skin cancer today on exam  -few scattered 1-3mm bright red macules and thin papules on the trunk and extremities  -Multiple tan medium and dark brown skin-colored macules papules scattered over the trunk >> extremities--All with benign-appearing pigment network patterns on dermoscopy  -Few tan medium brown stuck-on waxy papules scattered on the trunk and extremities      IMPRESSION / PLAN:    1. Lentigines  - Benign-appearing nature of lesions discussed during exam.   - Advised to continue to  monitor for any return to clinic for new or concerning changes.      2. Multiple nevi  - Benign-appearing nature of lesions discussed during exam.   - Advised to continue to monitor for any return to clinic for new or concerning changes.  - ABCDE's of melanoma discussed/handout given      3. Cherry angioma  - Benign-appearing nature of lesions discussed during exam.   - Advised to continue to monitor for any return to clinic for new or concerning changes.      4. SK (seborrheic keratosis)  - Benign-appearing nature of lesions discussed during exam.   - Advised to continue to monitor for any return to clinic for new or concerning changes.      5. Skin cancer screening  Skin cancer education  discussed importance of sun protective clothing, eyewear in addition to the use of broad spectrum sunscreen with SPF 30 or greater, as well as need for reapplication ~every 2 hours when exposed to UVR  discussed importance following up for any new or changing lesions as noted in handout given, but every 12 months exams in clinic in the setting of dermatologic history  ABCDE's of melanoma discussed/handout given          Please note that this dictation was created using voice recognition software. I have made every reasonable attempt to correct obvious errors, but I expect that there are errors of grammar and possibly content that I did not discover before finalizing the note.      Return to clinic in: Return in about 1 year (around 4/26/2024) for CALLIE. and as needed for any new or changing skin lesions.

## 2023-05-29 ENCOUNTER — HOSPITAL ENCOUNTER (OUTPATIENT)
Dept: RADIOLOGY | Facility: MEDICAL CENTER | Age: 33
End: 2023-05-29
Attending: NURSE PRACTITIONER
Payer: COMMERCIAL

## 2023-05-29 DIAGNOSIS — M43.12 SPONDYLOLISTHESIS OF CERVICAL REGION: ICD-10-CM

## 2023-05-29 DIAGNOSIS — M50.30 DDD (DEGENERATIVE DISC DISEASE), CERVICAL: ICD-10-CM

## 2023-05-29 PROCEDURE — 72141 MRI NECK SPINE W/O DYE: CPT

## 2023-06-17 ENCOUNTER — PHARMACY VISIT (OUTPATIENT)
Dept: PHARMACY | Facility: MEDICAL CENTER | Age: 33
End: 2023-06-17
Payer: COMMERCIAL

## 2023-06-17 PROCEDURE — RXMED WILLOW AMBULATORY MEDICATION CHARGE: Performed by: NURSE PRACTITIONER

## 2023-06-17 RX ORDER — ONDANSETRON 4 MG/1
4 TABLET, ORALLY DISINTEGRATING ORAL EVERY 6 HOURS PRN
Qty: 20 TABLET | Refills: 1 | Status: SHIPPED | OUTPATIENT
Start: 2023-06-17 | End: 2023-11-21

## 2023-07-19 ENCOUNTER — DOCUMENTATION (OUTPATIENT)
Dept: HEALTH INFORMATION MANAGEMENT | Facility: OTHER | Age: 33
End: 2023-07-19
Payer: COMMERCIAL

## 2023-09-20 ENCOUNTER — OFFICE VISIT (OUTPATIENT)
Dept: MEDICAL GROUP | Facility: MEDICAL CENTER | Age: 33
End: 2023-09-20
Payer: COMMERCIAL

## 2023-09-20 VITALS
DIASTOLIC BLOOD PRESSURE: 60 MMHG | OXYGEN SATURATION: 99 % | HEART RATE: 64 BPM | TEMPERATURE: 98.4 F | HEIGHT: 64 IN | RESPIRATION RATE: 16 BRPM | SYSTOLIC BLOOD PRESSURE: 108 MMHG | WEIGHT: 130.07 LBS | BODY MASS INDEX: 22.21 KG/M2

## 2023-09-20 DIAGNOSIS — G47.00 INSOMNIA, UNSPECIFIED TYPE: ICD-10-CM

## 2023-09-20 DIAGNOSIS — R00.2 HEART PALPITATIONS: ICD-10-CM

## 2023-09-20 DIAGNOSIS — F43.29 STRESS AND ADJUSTMENT REACTION: ICD-10-CM

## 2023-09-20 DIAGNOSIS — F41.9 ANXIETY: ICD-10-CM

## 2023-09-20 DIAGNOSIS — G43.009 MIGRAINE WITHOUT AURA AND WITHOUT STATUS MIGRAINOSUS, NOT INTRACTABLE: ICD-10-CM

## 2023-09-20 DIAGNOSIS — Z23 NEED FOR VACCINATION: ICD-10-CM

## 2023-09-20 PROCEDURE — 90686 IIV4 VACC NO PRSV 0.5 ML IM: CPT | Performed by: NURSE PRACTITIONER

## 2023-09-20 PROCEDURE — 3078F DIAST BP <80 MM HG: CPT | Performed by: NURSE PRACTITIONER

## 2023-09-20 PROCEDURE — 99214 OFFICE O/P EST MOD 30 MIN: CPT | Mod: 25 | Performed by: NURSE PRACTITIONER

## 2023-09-20 PROCEDURE — 90471 IMMUNIZATION ADMIN: CPT | Performed by: NURSE PRACTITIONER

## 2023-09-20 PROCEDURE — 3074F SYST BP LT 130 MM HG: CPT | Performed by: NURSE PRACTITIONER

## 2023-09-20 RX ORDER — TIZANIDINE 2 MG/1
2 TABLET ORAL
COMMUNITY
Start: 2023-08-11

## 2023-09-20 RX ORDER — VENLAFAXINE HYDROCHLORIDE 37.5 MG/1
37.5 CAPSULE, EXTENDED RELEASE ORAL DAILY
Qty: 90 CAPSULE | Refills: 1 | Status: SHIPPED
Start: 2023-09-20 | End: 2024-02-26

## 2023-09-20 ASSESSMENT — FIBROSIS 4 INDEX: FIB4 SCORE: 0.88

## 2023-09-20 NOTE — PROGRESS NOTES
Chief Complaint   Patient presents with    Migraine     Had trail migraine medication Ubrelvy and Nurtec     Palpitations     Heart palpitation, last 3-6 months worsen, SOB and nausea sometimes, sometimes squeezing in arm         Subjective:     HPI:     Belgica Irizarry is a 32 y.o. female here to discuss the following:    Patient presents today as she has been having increased frequency of her migraines.  She has had chronic migraines since age of 19.  Sumatriptan, rizatriptan were not effective.  Topamax made her foggy.  Has also tried acupuncture and an occipital nerve injection.  This only provided temporary relief.  She was recently given samples of Nurtec and Ubrelvy and this was helpful.  She is requesting a to have a prescription for this.    Stress, anxiety and lack of sleep are highly suspicious for her triggers.  She also gets menstrual migraines 3 days before her cycle.  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    She has been having heart palpitations, slight nausea and squeezing in her left arm for about 3 to 6 months.  This did provide pause for concern.    She does have underlying anxiety however felt as if it was mostly controlled.    ROS: : see above        Current Outpatient Medications:     tizanidine (ZANAFLEX) 2 MG tablet, , Disp: , Rfl:     Ubrogepant 50 MG Tab, Take 1 Tablet by mouth as needed (at onset of migraine-may repeat dose after 2 hours. Maximum: 200 mg per 24 hours)., Disp: 30 Tablet, Rfl: 0    Rimegepant Sulfate (NURTEC) 75 MG TABLET DISPERSIBLE, Take 1 Tablet by mouth every 48 hours., Disp: 30 Tablet, Rfl: 0    venlafaxine XR (EFFEXOR XR) 37.5 MG CAPSULE SR 24 HR, Take 1 Capsule by mouth every day., Disp: 90 Capsule, Rfl: 1    B Complex Vitamins (VITAMIN B COMPLEX PO), , Disp: , Rfl:     Prenatal Vit-Fe Fumarate-FA (PRENATAL VITAMIN PO), Take  by mouth., Disp: , Rfl:     VITAMIN D  "PO, Take  by mouth., Disp: , Rfl:     EPINEPHrine (EPIPEN) 0.3 MG/0.3ML Solution Auto-injector solution for injection, , Disp: , Rfl:     VITAMIN E PO, Take 1 Cap by mouth every day., Disp: , Rfl:     ondansetron (ZOFRAN ODT) 4 MG TABLET DISPERSIBLE, Take 1 Tablet by mouth every 6 hours as needed. (Patient not taking: Reported on 9/20/2023), Disp: 20 Tablet, Rfl: 1    meloxicam (MOBIC) 15 MG tablet, Take 1 Tablet by mouth every day. (Patient not taking: Reported on 9/20/2023), Disp: 30 Tablet, Rfl: 0    cyclobenzaprine (FLEXERIL) 5 mg tablet, Take 1-2 Tablets by mouth at bedtime as needed for Muscle Spasms. (Patient not taking: Reported on 9/20/2023), Disp: 30 Tablet, Rfl: 0    buPROPion (WELLBUTRIN XL) 150 MG XL tablet, Take 1 Tablet by mouth every morning. (Patient not taking: Reported on 9/20/2023), Disp: 90 Tablet, Rfl: 1    Coenzyme Q10 (CO Q-10 PO), , Disp: , Rfl:     Allergies   Allergen Reactions    Pcn [Penicillins] Rash     Pt reports that she gets a full body rash    Clindamycin Rash     Pt reports that she gets a full body rash       Objective:     Vitals: /60   Pulse 64   Temp 36.9 °C (98.4 °F) (Temporal)   Resp 16   Ht 1.626 m (5' 4\")   Wt 59 kg (130 lb 1.1 oz)   SpO2 99%   BMI 22.33 kg/m²    General: Alert, pleasant, NAD  HEENT: Normocephalic.  Neck supple.   Respiratory: no distress, no audible wheezing, RR -WNL  Skin: Warm, dry, no rashes.  Extremities: No leg edema. No discoloration  Neurological: No tremors  Psych:  Affect/mood is normal, judgement is good, memory is intact, grooming is appropriate.    Assessment/Plan:      1. Migraine without aura and without status migrainosus, not intractable  Chronic, frequency increasing.  Patient does endorse significant external stressors including her job, work and she is also in school to get her DNP.  Would like to have a prescription for Ubrelvy and Nurtec as this was helpful for her migraines.  She has failed sumatriptan, rizatriptan and " Topamax.  She may need a prior authorization.  Recommend working on stress relieving techniques as well as improving her sleep as she does have significant sleep disruption.  Given her underlying anxiety recommend trial of Effexor as this does have migraine prophylaxis properties.  Trial of Effexor  - Ubrogepant 50 MG Tab; Take 1 Tablet by mouth as needed (at onset of migraine-may repeat dose after 2 hours. Maximum: 200 mg per 24 hours).  Dispense: 30 Tablet; Refill: 0  - Rimegepant Sulfate (NURTEC) 75 MG TABLET DISPERSIBLE; Take 1 Tablet by mouth every 48 hours.  Dispense: 30 Tablet; Refill: 0  - venlafaxine XR (EFFEXOR XR) 37.5 MG CAPSULE SR 24 HR; Take 1 Capsule by mouth every day.  Dispense: 90 Capsule; Refill: 1    2. Heart palpitations  Acute problem.  Suspect in the setting of stress, anxiety.  However we will order a Holter monitor for her to wear for at least 14 days to ensure that she does not have any concerning arrhythmias.  - HOLTER - Cardiology Performed (48HR); Future    3. Stress and adjustment reaction  Not well controlled at this time.  Have discussed working on stress relieving techniques.  Trial of Effexor.    4. Insomnia, unspecified type  Not well controlled  Suspect 2/2 to uncontrolled stress/anxiety  Recommend sleep hygiene.  ?  Consider hydroxyzine trial    5. Anxiety  Not well controlled-now with physical manifestations  Have discussed in detail working on stress relieving techniques, possibly reducing work hours, guided meditation, exercise.  Trial of Effexor  - venlafaxine XR (EFFEXOR XR) 37.5 MG CAPSULE SR 24 HR; Take 1 Capsule by mouth every day.  Dispense: 90 Capsule; Refill: 1     6. Need for vaccination  - INFLUENZA VACCINE QUAD INJ (PF)      Return in about 3 months (around 12/20/2023).    {I have placed the above orders and discussed them with an approved delegating provider. The MA is performing the below orders under the direction of Dr. Nickie Larsen  HANK

## 2023-09-22 ENCOUNTER — NON-PROVIDER VISIT (OUTPATIENT)
Dept: CARDIOLOGY | Facility: MEDICAL CENTER | Age: 33
End: 2023-09-22
Attending: NURSE PRACTITIONER
Payer: COMMERCIAL

## 2023-09-22 DIAGNOSIS — I49.3 PVCS (PREMATURE VENTRICULAR CONTRACTIONS): ICD-10-CM

## 2023-09-22 DIAGNOSIS — I47.19 PAT (PAROXYSMAL ATRIAL TACHYCARDIA) (HCC): ICD-10-CM

## 2023-09-22 DIAGNOSIS — R00.0 SINUS TACHYCARDIA: ICD-10-CM

## 2023-09-22 DIAGNOSIS — I49.1 PREMATURE ATRIAL CONTRACTIONS: ICD-10-CM

## 2023-09-22 DIAGNOSIS — R00.2 HEART PALPITATIONS: ICD-10-CM

## 2023-09-22 PROCEDURE — 93246 EXT ECG>7D<15D RECORDING: CPT

## 2023-09-22 NOTE — PROGRESS NOTES
Patient enrolled in the 14 day ePatch Holter monitoring program per JULIETH Mortensen.  >Office hook-up, serial # [id number].  >Currently pending EOS.

## 2023-10-12 ENCOUNTER — TELEPHONE (OUTPATIENT)
Dept: CARDIOLOGY | Facility: MEDICAL CENTER | Age: 33
End: 2023-10-12
Payer: COMMERCIAL

## 2023-10-16 PROCEDURE — 93248 EXT ECG>7D<15D REV&INTERPJ: CPT | Performed by: INTERNAL MEDICINE

## 2023-11-21 ENCOUNTER — PRE-ADMISSION TESTING (OUTPATIENT)
Dept: ADMISSIONS | Facility: MEDICAL CENTER | Age: 33
End: 2023-11-21
Attending: NEUROLOGICAL SURGERY
Payer: COMMERCIAL

## 2023-11-21 RX ORDER — ACETAMINOPHEN 500 MG
1000 TABLET ORAL EVERY 6 HOURS PRN
COMMUNITY
End: 2024-02-23

## 2023-11-30 ENCOUNTER — PRE-ADMISSION TESTING (OUTPATIENT)
Dept: ADMISSIONS | Facility: MEDICAL CENTER | Age: 33
End: 2023-11-30
Attending: NEUROLOGICAL SURGERY
Payer: COMMERCIAL

## 2023-11-30 DIAGNOSIS — Z01.812 PRE-OPERATIVE LABORATORY EXAMINATION: ICD-10-CM

## 2023-11-30 DIAGNOSIS — Z01.810 PRE-OPERATIVE CARDIOVASCULAR EXAMINATION: ICD-10-CM

## 2023-11-30 LAB
ABO GROUP BLD: NORMAL
ANION GAP SERPL CALC-SCNC: 9 MMOL/L (ref 7–16)
APPEARANCE UR: CLEAR
APTT PPP: 28.8 SEC (ref 24.7–36)
BASOPHILS # BLD AUTO: 0.7 % (ref 0–1.8)
BASOPHILS # BLD: 0.05 K/UL (ref 0–0.12)
BILIRUB UR QL STRIP.AUTO: NEGATIVE
BLD GP AB SCN SERPL QL: NORMAL
BUN SERPL-MCNC: 18 MG/DL (ref 8–22)
CALCIUM SERPL-MCNC: 9.7 MG/DL (ref 8.5–10.5)
CHLORIDE SERPL-SCNC: 103 MMOL/L (ref 96–112)
CO2 SERPL-SCNC: 28 MMOL/L (ref 20–33)
COLOR UR: YELLOW
CREAT SERPL-MCNC: 0.89 MG/DL (ref 0.5–1.4)
EKG IMPRESSION: NORMAL
EOSINOPHIL # BLD AUTO: 0.25 K/UL (ref 0–0.51)
EOSINOPHIL NFR BLD: 3.4 % (ref 0–6.9)
ERYTHROCYTE [DISTWIDTH] IN BLOOD BY AUTOMATED COUNT: 40.5 FL (ref 35.9–50)
GFR SERPLBLD CREATININE-BSD FMLA CKD-EPI: 88 ML/MIN/1.73 M 2
GLUCOSE SERPL-MCNC: 57 MG/DL (ref 65–99)
GLUCOSE UR STRIP.AUTO-MCNC: NEGATIVE MG/DL
HCT VFR BLD AUTO: 46.7 % (ref 37–47)
HGB BLD-MCNC: 15.6 G/DL (ref 12–16)
IMM GRANULOCYTES # BLD AUTO: 0.02 K/UL (ref 0–0.11)
IMM GRANULOCYTES NFR BLD AUTO: 0.3 % (ref 0–0.9)
INR PPP: 0.99 (ref 0.87–1.13)
KETONES UR STRIP.AUTO-MCNC: NEGATIVE MG/DL
LEUKOCYTE ESTERASE UR QL STRIP.AUTO: NEGATIVE
LYMPHOCYTES # BLD AUTO: 2.13 K/UL (ref 1–4.8)
LYMPHOCYTES NFR BLD: 29.1 % (ref 22–41)
MCH RBC QN AUTO: 30.4 PG (ref 27–33)
MCHC RBC AUTO-ENTMCNC: 33.4 G/DL (ref 32.2–35.5)
MCV RBC AUTO: 90.9 FL (ref 81.4–97.8)
MICRO URNS: NORMAL
MONOCYTES # BLD AUTO: 0.64 K/UL (ref 0–0.85)
MONOCYTES NFR BLD AUTO: 8.8 % (ref 0–13.4)
NEUTROPHILS # BLD AUTO: 4.22 K/UL (ref 1.82–7.42)
NEUTROPHILS NFR BLD: 57.7 % (ref 44–72)
NITRITE UR QL STRIP.AUTO: NEGATIVE
NRBC # BLD AUTO: 0 K/UL
NRBC BLD-RTO: 0 /100 WBC (ref 0–0.2)
PH UR STRIP.AUTO: 6.5 [PH] (ref 5–8)
PLATELET # BLD AUTO: 239 K/UL (ref 164–446)
PMV BLD AUTO: 11.1 FL (ref 9–12.9)
POTASSIUM SERPL-SCNC: 4.1 MMOL/L (ref 3.6–5.5)
PROT UR QL STRIP: NEGATIVE MG/DL
PROTHROMBIN TIME: 13.2 SEC (ref 12–14.6)
RBC # BLD AUTO: 5.14 M/UL (ref 4.2–5.4)
RBC UR QL AUTO: NEGATIVE
RH BLD: NORMAL
SODIUM SERPL-SCNC: 140 MMOL/L (ref 135–145)
SP GR UR STRIP.AUTO: 1.01
UROBILINOGEN UR STRIP.AUTO-MCNC: 0.2 MG/DL
WBC # BLD AUTO: 7.3 K/UL (ref 4.8–10.8)

## 2023-11-30 PROCEDURE — 86850 RBC ANTIBODY SCREEN: CPT

## 2023-11-30 PROCEDURE — 85610 PROTHROMBIN TIME: CPT

## 2023-11-30 PROCEDURE — 93010 ELECTROCARDIOGRAM REPORT: CPT | Performed by: INTERNAL MEDICINE

## 2023-11-30 PROCEDURE — 86901 BLOOD TYPING SEROLOGIC RH(D): CPT

## 2023-11-30 PROCEDURE — 80048 BASIC METABOLIC PNL TOTAL CA: CPT

## 2023-11-30 PROCEDURE — 36415 COLL VENOUS BLD VENIPUNCTURE: CPT

## 2023-11-30 PROCEDURE — 81003 URINALYSIS AUTO W/O SCOPE: CPT

## 2023-11-30 PROCEDURE — 93005 ELECTROCARDIOGRAM TRACING: CPT

## 2023-11-30 PROCEDURE — 85025 COMPLETE CBC W/AUTO DIFF WBC: CPT

## 2023-11-30 PROCEDURE — 86900 BLOOD TYPING SEROLOGIC ABO: CPT

## 2023-11-30 PROCEDURE — 85730 THROMBOPLASTIN TIME PARTIAL: CPT

## 2023-12-20 ENCOUNTER — HOSPITAL ENCOUNTER (OUTPATIENT)
Facility: MEDICAL CENTER | Age: 33
End: 2023-12-20
Attending: NEUROLOGICAL SURGERY | Admitting: NEUROLOGICAL SURGERY
Payer: COMMERCIAL

## 2023-12-20 ENCOUNTER — PHARMACY VISIT (OUTPATIENT)
Dept: PHARMACY | Facility: MEDICAL CENTER | Age: 33
End: 2023-12-20
Payer: COMMERCIAL

## 2023-12-20 ENCOUNTER — ANESTHESIA (OUTPATIENT)
Dept: SURGERY | Facility: MEDICAL CENTER | Age: 33
End: 2023-12-20
Payer: COMMERCIAL

## 2023-12-20 ENCOUNTER — APPOINTMENT (OUTPATIENT)
Dept: RADIOLOGY | Facility: MEDICAL CENTER | Age: 33
End: 2023-12-20
Attending: NEUROLOGICAL SURGERY
Payer: COMMERCIAL

## 2023-12-20 ENCOUNTER — ANESTHESIA EVENT (OUTPATIENT)
Dept: SURGERY | Facility: MEDICAL CENTER | Age: 33
End: 2023-12-20
Payer: COMMERCIAL

## 2023-12-20 VITALS
SYSTOLIC BLOOD PRESSURE: 118 MMHG | DIASTOLIC BLOOD PRESSURE: 73 MMHG | WEIGHT: 140.21 LBS | HEART RATE: 97 BPM | BODY MASS INDEX: 23.94 KG/M2 | HEIGHT: 64 IN | OXYGEN SATURATION: 96 % | TEMPERATURE: 98.2 F | RESPIRATION RATE: 15 BRPM

## 2023-12-20 PROBLEM — M48.02 CERVICAL STENOSIS OF SPINAL CANAL: Status: ACTIVE | Noted: 2023-12-20

## 2023-12-20 LAB
ABO + RH BLD: NORMAL
HCG UR QL: NEGATIVE

## 2023-12-20 PROCEDURE — 160002 HCHG RECOVERY MINUTES (STAT): Performed by: NEUROLOGICAL SURGERY

## 2023-12-20 PROCEDURE — 160029 HCHG SURGERY MINUTES - 1ST 30 MINS LEVEL 4: Performed by: NEUROLOGICAL SURGERY

## 2023-12-20 PROCEDURE — 770030 HCHG ROOM/CARE - EXTENDED RECOVERY EACH 15 MIN

## 2023-12-20 PROCEDURE — 160036 HCHG PACU - EA ADDL 30 MINS PHASE I: Performed by: NEUROLOGICAL SURGERY

## 2023-12-20 PROCEDURE — C1713 ANCHOR/SCREW BN/BN,TIS/BN: HCPCS | Performed by: NEUROLOGICAL SURGERY

## 2023-12-20 PROCEDURE — 502000 HCHG MISC OR IMPLANTS RC 0278: Performed by: NEUROLOGICAL SURGERY

## 2023-12-20 PROCEDURE — 700111 HCHG RX REV CODE 636 W/ 250 OVERRIDE (IP): Performed by: NEUROLOGICAL SURGERY

## 2023-12-20 PROCEDURE — 700102 HCHG RX REV CODE 250 W/ 637 OVERRIDE(OP): Performed by: ANESTHESIOLOGY

## 2023-12-20 PROCEDURE — 700111 HCHG RX REV CODE 636 W/ 250 OVERRIDE (IP): Mod: JZ | Performed by: ANESTHESIOLOGY

## 2023-12-20 PROCEDURE — 700101 HCHG RX REV CODE 250: Performed by: NEUROLOGICAL SURGERY

## 2023-12-20 PROCEDURE — 110454 HCHG SHELL REV 250: Performed by: NEUROLOGICAL SURGERY

## 2023-12-20 PROCEDURE — 160048 HCHG OR STATISTICAL LEVEL 1-5: Performed by: NEUROLOGICAL SURGERY

## 2023-12-20 PROCEDURE — 36415 COLL VENOUS BLD VENIPUNCTURE: CPT

## 2023-12-20 PROCEDURE — 700102 HCHG RX REV CODE 250 W/ 637 OVERRIDE(OP): Performed by: NEUROLOGICAL SURGERY

## 2023-12-20 PROCEDURE — A9270 NON-COVERED ITEM OR SERVICE: HCPCS | Performed by: NEUROLOGICAL SURGERY

## 2023-12-20 PROCEDURE — 96365 THER/PROPH/DIAG IV INF INIT: CPT

## 2023-12-20 PROCEDURE — 700111 HCHG RX REV CODE 636 W/ 250 OVERRIDE (IP): Performed by: ANESTHESIOLOGY

## 2023-12-20 PROCEDURE — 110371 HCHG SHELL REV 272: Performed by: NEUROLOGICAL SURGERY

## 2023-12-20 PROCEDURE — 72040 X-RAY EXAM NECK SPINE 2-3 VW: CPT

## 2023-12-20 PROCEDURE — 160041 HCHG SURGERY MINUTES - EA ADDL 1 MIN LEVEL 4: Performed by: NEUROLOGICAL SURGERY

## 2023-12-20 PROCEDURE — 700101 HCHG RX REV CODE 250: Performed by: ANESTHESIOLOGY

## 2023-12-20 PROCEDURE — 160009 HCHG ANES TIME/MIN: Performed by: NEUROLOGICAL SURGERY

## 2023-12-20 PROCEDURE — 81025 URINE PREGNANCY TEST: CPT

## 2023-12-20 PROCEDURE — 96375 TX/PRO/DX INJ NEW DRUG ADDON: CPT

## 2023-12-20 PROCEDURE — A9270 NON-COVERED ITEM OR SERVICE: HCPCS | Performed by: ANESTHESIOLOGY

## 2023-12-20 PROCEDURE — 700105 HCHG RX REV CODE 258: Performed by: NEUROLOGICAL SURGERY

## 2023-12-20 PROCEDURE — RXMED WILLOW AMBULATORY MEDICATION CHARGE: Performed by: CLINICAL NURSE SPECIALIST

## 2023-12-20 PROCEDURE — 160035 HCHG PACU - 1ST 60 MINS PHASE I: Performed by: NEUROLOGICAL SURGERY

## 2023-12-20 DEVICE — IMPLANTABLE DEVICE: Type: IMPLANTABLE DEVICE | Site: NECK | Status: FUNCTIONAL

## 2023-12-20 RX ORDER — HYDROMORPHONE HYDROCHLORIDE 1 MG/ML
0.2 INJECTION, SOLUTION INTRAMUSCULAR; INTRAVENOUS; SUBCUTANEOUS
Status: DISCONTINUED | OUTPATIENT
Start: 2023-12-20 | End: 2023-12-20 | Stop reason: HOSPADM

## 2023-12-20 RX ORDER — BISACODYL 10 MG
10 SUPPOSITORY, RECTAL RECTAL
Status: DISCONTINUED | OUTPATIENT
Start: 2023-12-20 | End: 2023-12-20 | Stop reason: HOSPADM

## 2023-12-20 RX ORDER — SODIUM CHLORIDE, SODIUM LACTATE, POTASSIUM CHLORIDE, CALCIUM CHLORIDE 600; 310; 30; 20 MG/100ML; MG/100ML; MG/100ML; MG/100ML
INJECTION, SOLUTION INTRAVENOUS CONTINUOUS
Status: ACTIVE | OUTPATIENT
Start: 2023-12-20 | End: 2023-12-20

## 2023-12-20 RX ORDER — MEPERIDINE HYDROCHLORIDE 25 MG/ML
12.5 INJECTION INTRAMUSCULAR; INTRAVENOUS; SUBCUTANEOUS
Status: DISCONTINUED | OUTPATIENT
Start: 2023-12-20 | End: 2023-12-20 | Stop reason: HOSPADM

## 2023-12-20 RX ORDER — SODIUM CHLORIDE, SODIUM LACTATE, POTASSIUM CHLORIDE, CALCIUM CHLORIDE 600; 310; 30; 20 MG/100ML; MG/100ML; MG/100ML; MG/100ML
INJECTION, SOLUTION INTRAVENOUS CONTINUOUS
Status: DISCONTINUED | OUTPATIENT
Start: 2023-12-20 | End: 2023-12-20 | Stop reason: HOSPADM

## 2023-12-20 RX ORDER — DIPHENHYDRAMINE HCL 25 MG
25 TABLET ORAL EVERY 6 HOURS PRN
Status: DISCONTINUED | OUTPATIENT
Start: 2023-12-20 | End: 2023-12-20 | Stop reason: HOSPADM

## 2023-12-20 RX ORDER — DEXAMETHASONE SODIUM PHOSPHATE 4 MG/ML
INJECTION, SOLUTION INTRA-ARTICULAR; INTRALESIONAL; INTRAMUSCULAR; INTRAVENOUS; SOFT TISSUE PRN
Status: DISCONTINUED | OUTPATIENT
Start: 2023-12-20 | End: 2023-12-20 | Stop reason: SURG

## 2023-12-20 RX ORDER — DOCUSATE SODIUM 100 MG/1
100 CAPSULE, LIQUID FILLED ORAL 2 TIMES DAILY
Status: DISCONTINUED | OUTPATIENT
Start: 2023-12-20 | End: 2023-12-20 | Stop reason: HOSPADM

## 2023-12-20 RX ORDER — CEFAZOLIN SODIUM 1 G/3ML
INJECTION, POWDER, FOR SOLUTION INTRAMUSCULAR; INTRAVENOUS PRN
Status: DISCONTINUED | OUTPATIENT
Start: 2023-12-20 | End: 2023-12-20 | Stop reason: SURG

## 2023-12-20 RX ORDER — VENLAFAXINE HYDROCHLORIDE 37.5 MG/1
37.5 CAPSULE, EXTENDED RELEASE ORAL DAILY
Status: DISCONTINUED | OUTPATIENT
Start: 2023-12-20 | End: 2023-12-20 | Stop reason: HOSPADM

## 2023-12-20 RX ORDER — OXYCODONE HCL 5 MG/5 ML
10 SOLUTION, ORAL ORAL
Status: COMPLETED | OUTPATIENT
Start: 2023-12-20 | End: 2023-12-20

## 2023-12-20 RX ORDER — HALOPERIDOL 5 MG/ML
1 INJECTION INTRAMUSCULAR
Status: DISCONTINUED | OUTPATIENT
Start: 2023-12-20 | End: 2023-12-20 | Stop reason: HOSPADM

## 2023-12-20 RX ORDER — LIDOCAINE HYDROCHLORIDE 40 MG/ML
SOLUTION TOPICAL PRN
Status: DISCONTINUED | OUTPATIENT
Start: 2023-12-20 | End: 2023-12-20 | Stop reason: SURG

## 2023-12-20 RX ORDER — OXYCODONE HCL 5 MG/5 ML
5 SOLUTION, ORAL ORAL
Status: COMPLETED | OUTPATIENT
Start: 2023-12-20 | End: 2023-12-20

## 2023-12-20 RX ORDER — ONDANSETRON 2 MG/ML
4 INJECTION INTRAMUSCULAR; INTRAVENOUS EVERY 4 HOURS PRN
Status: DISCONTINUED | OUTPATIENT
Start: 2023-12-20 | End: 2023-12-20 | Stop reason: HOSPADM

## 2023-12-20 RX ORDER — POLYETHYLENE GLYCOL 3350 17 G/17G
1 POWDER, FOR SOLUTION ORAL 2 TIMES DAILY PRN
Status: DISCONTINUED | OUTPATIENT
Start: 2023-12-20 | End: 2023-12-20 | Stop reason: HOSPADM

## 2023-12-20 RX ORDER — ROCURONIUM BROMIDE 10 MG/ML
INJECTION, SOLUTION INTRAVENOUS PRN
Status: DISCONTINUED | OUTPATIENT
Start: 2023-12-20 | End: 2023-12-20 | Stop reason: SURG

## 2023-12-20 RX ORDER — AMOXICILLIN 250 MG
1 CAPSULE ORAL NIGHTLY
Status: DISCONTINUED | OUTPATIENT
Start: 2023-12-20 | End: 2023-12-20 | Stop reason: HOSPADM

## 2023-12-20 RX ORDER — EPHEDRINE SULFATE 50 MG/ML
INJECTION, SOLUTION INTRAVENOUS PRN
Status: DISCONTINUED | OUTPATIENT
Start: 2023-12-20 | End: 2023-12-20 | Stop reason: SURG

## 2023-12-20 RX ORDER — AMOXICILLIN 250 MG
1 CAPSULE ORAL
Status: DISCONTINUED | OUTPATIENT
Start: 2023-12-20 | End: 2023-12-20 | Stop reason: HOSPADM

## 2023-12-20 RX ORDER — ONDANSETRON 2 MG/ML
INJECTION INTRAMUSCULAR; INTRAVENOUS PRN
Status: DISCONTINUED | OUTPATIENT
Start: 2023-12-20 | End: 2023-12-20 | Stop reason: SURG

## 2023-12-20 RX ORDER — TIZANIDINE 4 MG/1
2 TABLET ORAL EVERY 8 HOURS PRN
Status: DISCONTINUED | OUTPATIENT
Start: 2023-12-20 | End: 2023-12-20 | Stop reason: HOSPADM

## 2023-12-20 RX ORDER — ENEMA 19; 7 G/133ML; G/133ML
1 ENEMA RECTAL
Status: DISCONTINUED | OUTPATIENT
Start: 2023-12-20 | End: 2023-12-20 | Stop reason: HOSPADM

## 2023-12-20 RX ORDER — PROMETHAZINE HYDROCHLORIDE 25 MG/1
12.5-25 SUPPOSITORY RECTAL EVERY 4 HOURS PRN
Status: DISCONTINUED | OUTPATIENT
Start: 2023-12-20 | End: 2023-12-20 | Stop reason: HOSPADM

## 2023-12-20 RX ORDER — ONDANSETRON 2 MG/ML
4 INJECTION INTRAMUSCULAR; INTRAVENOUS
Status: COMPLETED | OUTPATIENT
Start: 2023-12-20 | End: 2023-12-20

## 2023-12-20 RX ORDER — DIPHENHYDRAMINE HYDROCHLORIDE 50 MG/ML
25 INJECTION INTRAMUSCULAR; INTRAVENOUS EVERY 6 HOURS PRN
Status: DISCONTINUED | OUTPATIENT
Start: 2023-12-20 | End: 2023-12-20 | Stop reason: HOSPADM

## 2023-12-20 RX ORDER — HYDROCODONE BITARTRATE AND ACETAMINOPHEN 5; 325 MG/1; MG/1
TABLET ORAL
Qty: 42 TABLET | Refills: 0 | OUTPATIENT
Start: 2023-12-20 | End: 2024-01-08

## 2023-12-20 RX ORDER — ACETAMINOPHEN 500 MG
1000 TABLET ORAL EVERY 6 HOURS PRN
Status: DISCONTINUED | OUTPATIENT
Start: 2023-12-20 | End: 2023-12-20 | Stop reason: HOSPADM

## 2023-12-20 RX ORDER — CEFAZOLIN SODIUM 1 G/3ML
INJECTION, POWDER, FOR SOLUTION INTRAMUSCULAR; INTRAVENOUS
Status: DISCONTINUED | OUTPATIENT
Start: 2023-12-20 | End: 2023-12-20 | Stop reason: HOSPADM

## 2023-12-20 RX ORDER — DIPHENHYDRAMINE HYDROCHLORIDE 50 MG/ML
12.5 INJECTION INTRAMUSCULAR; INTRAVENOUS
Status: DISCONTINUED | OUTPATIENT
Start: 2023-12-20 | End: 2023-12-20 | Stop reason: HOSPADM

## 2023-12-20 RX ORDER — ONDANSETRON 4 MG/1
4 TABLET, ORALLY DISINTEGRATING ORAL EVERY 4 HOURS PRN
Status: DISCONTINUED | OUTPATIENT
Start: 2023-12-20 | End: 2023-12-20 | Stop reason: HOSPADM

## 2023-12-20 RX ORDER — TIZANIDINE 2 MG/1
TABLET ORAL
Qty: 40 TABLET | Refills: 0 | Status: SHIPPED | OUTPATIENT
Start: 2023-12-20 | End: 2024-01-08

## 2023-12-20 RX ORDER — PROCHLORPERAZINE EDISYLATE 5 MG/ML
5-10 INJECTION INTRAMUSCULAR; INTRAVENOUS EVERY 4 HOURS PRN
Status: DISCONTINUED | OUTPATIENT
Start: 2023-12-20 | End: 2023-12-20 | Stop reason: HOSPADM

## 2023-12-20 RX ORDER — HYDRALAZINE HYDROCHLORIDE 20 MG/ML
5 INJECTION INTRAMUSCULAR; INTRAVENOUS
Status: DISCONTINUED | OUTPATIENT
Start: 2023-12-20 | End: 2023-12-20 | Stop reason: HOSPADM

## 2023-12-20 RX ORDER — HYDRALAZINE HYDROCHLORIDE 20 MG/ML
10 INJECTION INTRAMUSCULAR; INTRAVENOUS
Status: DISCONTINUED | OUTPATIENT
Start: 2023-12-20 | End: 2023-12-20 | Stop reason: HOSPADM

## 2023-12-20 RX ORDER — HYDROCODONE BITARTRATE AND ACETAMINOPHEN 5; 325 MG/1; MG/1
1 TABLET ORAL EVERY 4 HOURS PRN
Status: DISCONTINUED | OUTPATIENT
Start: 2023-12-20 | End: 2023-12-20 | Stop reason: HOSPADM

## 2023-12-20 RX ORDER — HYDROMORPHONE HYDROCHLORIDE 1 MG/ML
0.4 INJECTION, SOLUTION INTRAMUSCULAR; INTRAVENOUS; SUBCUTANEOUS
Status: DISCONTINUED | OUTPATIENT
Start: 2023-12-20 | End: 2023-12-20 | Stop reason: HOSPADM

## 2023-12-20 RX ORDER — PROMETHAZINE HYDROCHLORIDE 25 MG/1
12.5-25 TABLET ORAL EVERY 4 HOURS PRN
Status: DISCONTINUED | OUTPATIENT
Start: 2023-12-20 | End: 2023-12-20 | Stop reason: HOSPADM

## 2023-12-20 RX ORDER — HYDROMORPHONE HYDROCHLORIDE 1 MG/ML
0.6 INJECTION, SOLUTION INTRAMUSCULAR; INTRAVENOUS; SUBCUTANEOUS
Status: DISCONTINUED | OUTPATIENT
Start: 2023-12-20 | End: 2023-12-20 | Stop reason: HOSPADM

## 2023-12-20 RX ORDER — MIDAZOLAM HYDROCHLORIDE 1 MG/ML
INJECTION INTRAMUSCULAR; INTRAVENOUS PRN
Status: DISCONTINUED | OUTPATIENT
Start: 2023-12-20 | End: 2023-12-20 | Stop reason: SURG

## 2023-12-20 RX ORDER — BUPIVACAINE HYDROCHLORIDE AND EPINEPHRINE 5; 5 MG/ML; UG/ML
INJECTION, SOLUTION PERINEURAL
Status: DISCONTINUED | OUTPATIENT
Start: 2023-12-20 | End: 2023-12-20 | Stop reason: HOSPADM

## 2023-12-20 RX ORDER — SODIUM CHLORIDE 9 MG/ML
INJECTION, SOLUTION INTRAVENOUS CONTINUOUS
Status: DISCONTINUED | OUTPATIENT
Start: 2023-12-20 | End: 2023-12-20 | Stop reason: HOSPADM

## 2023-12-20 RX ADMIN — FENTANYL CITRATE 25 MCG: 50 INJECTION, SOLUTION INTRAMUSCULAR; INTRAVENOUS at 12:15

## 2023-12-20 RX ADMIN — MIDAZOLAM HYDROCHLORIDE 2 MG: 1 INJECTION, SOLUTION INTRAMUSCULAR; INTRAVENOUS at 07:00

## 2023-12-20 RX ADMIN — FENTANYL CITRATE 50 MCG: 50 INJECTION, SOLUTION INTRAMUSCULAR; INTRAVENOUS at 09:05

## 2023-12-20 RX ADMIN — FENTANYL CITRATE 50 MCG: 50 INJECTION, SOLUTION INTRAMUSCULAR; INTRAVENOUS at 09:00

## 2023-12-20 RX ADMIN — EPHEDRINE SULFATE 10 MG: 50 INJECTION, SOLUTION INTRAVENOUS at 07:55

## 2023-12-20 RX ADMIN — FENTANYL CITRATE 50 MCG: 50 INJECTION, SOLUTION INTRAMUSCULAR; INTRAVENOUS at 10:20

## 2023-12-20 RX ADMIN — SODIUM CHLORIDE: 9 INJECTION, SOLUTION INTRAVENOUS at 13:40

## 2023-12-20 RX ADMIN — ONDANSETRON 4 MG: 2 INJECTION INTRAMUSCULAR; INTRAVENOUS at 12:07

## 2023-12-20 RX ADMIN — OXYCODONE HYDROCHLORIDE 10 MG: 5 SOLUTION ORAL at 09:00

## 2023-12-20 RX ADMIN — FENTANYL CITRATE 100 MCG: 50 INJECTION, SOLUTION INTRAMUSCULAR; INTRAVENOUS at 07:00

## 2023-12-20 RX ADMIN — HYDROMORPHONE HYDROCHLORIDE 0.4 MG: 1 INJECTION, SOLUTION INTRAMUSCULAR; INTRAVENOUS; SUBCUTANEOUS at 09:25

## 2023-12-20 RX ADMIN — HYDROMORPHONE HYDROCHLORIDE 0.4 MG: 1 INJECTION, SOLUTION INTRAMUSCULAR; INTRAVENOUS; SUBCUTANEOUS at 09:30

## 2023-12-20 RX ADMIN — FENTANYL CITRATE 50 MCG: 50 INJECTION, SOLUTION INTRAMUSCULAR; INTRAVENOUS at 10:25

## 2023-12-20 RX ADMIN — LIDOCAINE HYDROCHLORIDE 4 ML: 40 SOLUTION TOPICAL at 07:04

## 2023-12-20 RX ADMIN — HALOPERIDOL LACTATE 1 MG: 5 INJECTION, SOLUTION INTRAMUSCULAR at 12:45

## 2023-12-20 RX ADMIN — SODIUM CHLORIDE, POTASSIUM CHLORIDE, SODIUM LACTATE AND CALCIUM CHLORIDE: 600; 310; 30; 20 INJECTION, SOLUTION INTRAVENOUS at 06:41

## 2023-12-20 RX ADMIN — PROPOFOL 200 MG: 10 INJECTION, EMULSION INTRAVENOUS at 07:00

## 2023-12-20 RX ADMIN — TIZANIDINE 2 MG: 4 TABLET ORAL at 16:34

## 2023-12-20 RX ADMIN — CEFAZOLIN 2 G: 1 INJECTION, POWDER, FOR SOLUTION INTRAMUSCULAR; INTRAVENOUS at 07:07

## 2023-12-20 RX ADMIN — SUGAMMADEX 100 MG: 100 INJECTION, SOLUTION INTRAVENOUS at 08:30

## 2023-12-20 RX ADMIN — HYDROCODONE BITARTRATE AND ACETAMINOPHEN 1 TABLET: 5; 325 TABLET ORAL at 13:11

## 2023-12-20 RX ADMIN — DEXAMETHASONE SODIUM PHOSPHATE 8 MG: 4 INJECTION INTRA-ARTICULAR; INTRALESIONAL; INTRAMUSCULAR; INTRAVENOUS; SOFT TISSUE at 07:08

## 2023-12-20 RX ADMIN — HYDROMORPHONE HYDROCHLORIDE 0.4 MG: 1 INJECTION, SOLUTION INTRAMUSCULAR; INTRAVENOUS; SUBCUTANEOUS at 09:35

## 2023-12-20 RX ADMIN — PROCHLORPERAZINE EDISYLATE 10 MG: 5 INJECTION INTRAMUSCULAR; INTRAVENOUS at 16:10

## 2023-12-20 RX ADMIN — ROCURONIUM BROMIDE 50 MG: 50 INJECTION, SOLUTION INTRAVENOUS at 07:02

## 2023-12-20 RX ADMIN — FENTANYL CITRATE 50 MCG: 50 INJECTION, SOLUTION INTRAMUSCULAR; INTRAVENOUS at 08:30

## 2023-12-20 RX ADMIN — HYDROMORPHONE HYDROCHLORIDE 0.4 MG: 1 INJECTION, SOLUTION INTRAMUSCULAR; INTRAVENOUS; SUBCUTANEOUS at 09:20

## 2023-12-20 RX ADMIN — ONDANSETRON 4 MG: 2 INJECTION INTRAMUSCULAR; INTRAVENOUS at 08:09

## 2023-12-20 RX ADMIN — HYDROMORPHONE HYDROCHLORIDE 0.4 MG: 1 INJECTION, SOLUTION INTRAMUSCULAR; INTRAVENOUS; SUBCUTANEOUS at 09:15

## 2023-12-20 RX ADMIN — CEFAZOLIN 2 G: 2 INJECTION, POWDER, FOR SOLUTION INTRAMUSCULAR; INTRAVENOUS at 15:49

## 2023-12-20 ASSESSMENT — FIBROSIS 4 INDEX
FIB4 SCORE: 0.73
FIB4 SCORE: 0.73

## 2023-12-20 ASSESSMENT — PAIN DESCRIPTION - PAIN TYPE
TYPE: ACUTE PAIN;SURGICAL PAIN

## 2023-12-20 ASSESSMENT — PAIN SCALES - GENERAL: PAIN_LEVEL: 1

## 2023-12-20 NOTE — PROGRESS NOTES
Pt arrived to unit via stretcher at 1300. Pt oriented to room, unit, and plan of care.  All questions answered at this time. Call light within reach; fall precautions in place.

## 2023-12-20 NOTE — ANESTHESIA PREPROCEDURE EVALUATION
Case: [id number] Date/Time: 12/20/23 0645    Procedure: C5-6 ANTERIOR PLACEMENT OF ARTIFICIAL DISC    Pre-op diagnosis: CERVICAL RADICULOPATHY    Location: [facility] OR 04 / SURGERY [facility]    Surgeons: Charlie Burkett M.D.            Relevant Problems   NEURO   (positive) Migraine without aura and without status migrainosus, not intractable      CARDIAC   (positive) Migraine without aura and without status migrainosus, not intractable      GI   (positive) Gastroesophageal reflux disease with esophagitis       Physical Exam    Airway   Mallampati: II  TM distance: >3 FB  Neck ROM: full       Cardiovascular - normal exam  Rhythm: regular  Rate: normal  (-) murmur     Dental - normal exam           Pulmonary - normal exam  Breath sounds clear to auscultation     Abdominal    Neurological - normal exam                   Anesthesia Plan    ASA 1       Plan - general       Airway plan will be ETT          Induction: intravenous    Postoperative Plan: Postoperative administration of opioids is intended.    Pertinent diagnostic labs and testing reviewed    Informed Consent:    Anesthetic plan and risks discussed with patient.    Use of blood products discussed with: patient whom consented to blood products.

## 2023-12-20 NOTE — ANESTHESIA PROCEDURE NOTES
Airway    Date/Time: 12/20/2023 7:04 AM    Performed by: Hunter Herbert M.D.  Authorized by: Hunter Herbert M.D.    Location:  OR  Urgency:  Elective  Indications for Airway Management:  Anesthesia      Spontaneous Ventilation: absent    Sedation Level:  Deep  Preoxygenated: Yes    Patient Position:  Sniffing  Final Airway Type:  Endotracheal airway  Final Endotracheal Airway:  ETT  Cuffed: Yes    Technique Used for Successful ETT Placement:  Direct laryngoscopy    Insertion Site:  Oral  Blade Type:  Yamil  Laryngoscope Blade/Videolaryngoscope Blade Size:  3  ETT Size (mm):  6.5  Measured from:  Teeth  ETT to Teeth (cm):  21  Placement Verified by: auscultation and capnometry    Cormack-Lehane Classification:  Grade I - full view of glottis  Number of Attempts at Approach:  1

## 2023-12-20 NOTE — OP REPORT
DATE OF SERVICE:  12/20/2023     PREOPERATIVE DIAGNOSIS:  Cervical radiculopathy recalcitrant to nonoperative   measures.     POSTOPERATIVE DIAGNOSIS:  Cervical radiculopathy recalcitrant to nonoperative   measures.     PROCEDURES:    1.  Anterior cervical 5-6 diskectomy with interbody arthrodesis.  2.  Bilateral neural foraminotomies, cervical 6 roots.  3.  Implantation of Medtronic Prestige LP disk arthroplasty at cervical 5-6.  4.  Use of operative microscope for microdissection.     SURGEON:  Charlie Burkett MD     ASSISTANT:  DEYVI Kumar     ANESTHESIA:  General.     COMPLICATIONS:  None.     ESTIMATED BLOOD LOSS:  Minimal.     DESCRIPTION OF PROCEDURE:  The patient was brought to the operating room,   identified in the usual fashion.  General endotracheal anesthesia was induced   by the anesthesia team.  The patient was then placed supine on the operating   room table.  All pressure points were meticulously padded.  Transverse   incision was marked in the left anterior neck using fluoroscopic guidance.    She was then prepped and draped in the usual sterile fashion.  Local   anesthesia was infiltrated into the subcutaneous tissue.  A 15 blade was used   to incise the skin.  Dissection was carried down through platysma using Bovie   electrocautery.  Retractor put in place.  We then undermined platysma and   identified the medial border of the sternocleidomastoid muscle sharply.  We   then used blunt dissection using a lipless retractor and a peanut to access   the anterior spine.  Spinal needle was placed at cervical 5-6.  Film was taken   confirming that we were at the correct level.  This was then marked with a   Bovie.  We then exposed minimal anterior osteophytes and reflected longus   colli muscles laterally bilaterally using a Bovie.   retractors were   placed underneath longus colli then superiorly and inferiorly, and brought in   the operative microscope for the diskectomy.  Kristina  pins were placed at   cervical 5 and 6. For gentle disk space distraction, a 15 blade was used to   incise the disk space.  We then removed disk contents using an alley and a   pituitary and upgoing curette. Anterior and posterior osteophytes were drilled   flush to the vertebral body using a high-speed air drill. Kerrison 1 and 2   punches were then used to remove the posterior longitudinal ligament and   posterior osteophytes.  Microsect curettes confirmed we had excellent   decompression of the central canal and bilateral neural foramina.  FloSeal   gentle tamponade was used for hemostasis.  A 6x14 mm disk arthroplasty was   found to be the appropriate size by placing a trial and taking an x-ray.  We   then cut the troughs for the arthroplasty as required.  The trial was then   removed. Bone from the troughs was then removed using a micronerve hook.    FloSeal gentle tamponade was used for hemostasis along with copious amounts of   antibiotic irrigation.  We then inserted the disk arthroplasty to the   appropriate level.  Film was taken, both AP and lateral, showing it looked to   be in excellent position and we were at the correct level.  We then removed   all retractors.  Hemostasis in longus colli was achieved with bipolar.  We had   meticulous hemostasis and closed the incision in layers and we left a Hemovac   drain.  There were no complications.  I was present and scrubbed for the   entire procedure.        ______________________________  Charlie Burkett MD    CPD/STEVIE    DD:  12/20/2023 12:40  DT:  12/20/2023 13:37    Job#:  [id number]

## 2023-12-20 NOTE — ANESTHESIA TIME REPORT
Anesthesia Start and Stop Event Times       Date Time Event    12/20/2023 0647 Ready for Procedure     0700 Anesthesia Start     0849 Anesthesia Stop          Responsible Staff  12/20/23      Name Role Begin End    Hunter Herbert M.D. Anesth 0700 0849          Overtime Reason:  no overtime (within assigned shift)    Comments: SUSAN 7am start

## 2023-12-20 NOTE — PROGRESS NOTES
Medication history reviewed with PT at bedside    [facility] rec is complete per PT reporting    Allergies reviewed.     Patient denies any outpatient antibiotics in the last 30 days.     Patient is not taking anticoagulants.    Preferred pharmacy for this visit - [facility] on [address] ([phone])

## 2023-12-20 NOTE — DISCHARGE INSTRUCTIONS
Ok to shower, pat incision dry, leave open to air- no dressing   No Aspirin or non-steroidal anti-inflammatory medications (aleve, motrin, ibuprofen)   No driving while on narcotic medication  Over the counter stool softeners daily while on narcotics  No lifting greater than 10 pounds, no repetitive motion above shoulder level  Follow up at [facility] 2 weeks after surgery          Cervical Fusion, Care After  This sheet gives you information about how to care for yourself after your procedure. Your health care provider may also give you more specific instructions. If you have problems or questions, contact your health care provider.  What can I expect after the procedure?  After the procedure, it is common to have:  Pain in the incision area.  Numbness.  Weakness.  Sore throat.  Difficulty swallowing.  Follow these instructions at home:  Medicines  Take over-the-counter and prescription medicines only as told by your health care provider.  If you were prescribed an antibiotic medicine, take it as told by your health care provider. Do not stop taking the antibiotic even if you start to feel better.  Ask your health care provider if the medicine prescribed to you:  Requires you to avoid driving or using machinery.  Can cause constipation. You may need to take these actions to prevent or treat constipation:  Drink enough fluid to keep your urine pale yellow.  Take over-the-counter or prescription medicines.  Eat foods that are high in fiber, such as beans, whole grains, and fresh fruits and vegetables.  Limit foods that are high in fat and processed sugars, such as fried or sweet foods.  If you are taking blood thinners:  Talk with your health care provider before you take any medicines that contain aspirin or NSAIDs, such as ibuprofen. These medicines increase your risk for dangerous bleeding.  Take your medicine exactly as told, at the same time every day.  Avoid activities that could cause injury or  bruising, and follow instructions about how to prevent falls.  Wear a medical alert bracelet or carry a card that lists what medicines you take.  If you have a brace:  Wear the brace as told by your health care provider. Remove it only as told by your health care provider.  Keep the brace clean.  If the brace is not waterproof:  Do not let it get wet.  Cover it with a watertight covering when you take a bath or shower.  Incision care    Follow instructions from your health care provider about how to take care of your incision. Make sure you:  Wash your hands with soap and water for at least 20 seconds before and after you change your bandage (dressing). If soap and water are not available, use hand .  Change your dressing as told by your health care provider.  Leave stitches (sutures), skin glue, or adhesive strips in place. These skin closures may need to be in place for 2 weeks or longer. If adhesive strip edges start to loosen and curl up, you may trim the loose edges. Do not remove adhesive strips completely unless your health care provider tells you to do that.  Keep your incision clean and dry.  Do not take baths, swim, or use a hot tub until your health care provider approves. Ask your health care provider if you may take showers. You may only be allowed to take sponge baths.  Check your incision area every day for signs of infection. Check for:  More redness, swelling, or pain.  Fluid or blood.  Warmth.  Pus or a bad smell.  Activity    Rest as told by your health care provider.  Avoid sitting for a long time without moving. Get up to take short walks every 1-2 hours. This is important to improve blood flow and breathing. You will be taught how to move correctly and how to stand and walk. Ask for help if you feel weak or unsteady.  Do not lift anything that is heavier than 10 lb (4.5 kg), or the limit that you are told, until your health care provider says that it is safe.  Do not twist or bend at  the waist until your health care provider approves.  Protect your back and neck as needed. Avoid:  Pushing and pulling motions.  Lifting anything over your head.  Sitting or lying down in the same position for long periods of time.  Do not exercise until your health care provider approves. Once your health care provider has approved exercise, ask him or her what kinds of exercises you can do to make your back stronger (physical therapy).  Do your breathing exercises as told by your health care provider. Breathing exercises prevent lung infection.  Do not drive until your health care provider approves.  General instructions  Have someone assist you to turn in bed frequently by moving your whole body without twisting your back (log rolling technique).  Wear compression stockings as told by your health care provider. These stockings help to prevent blood clots and reduce swelling in your legs.  Do not use any products that contain nicotine or tobacco, such as cigarettes, e-cigarettes, and chewing tobacco. These can delay bone healing. If you need help quitting, ask your health care provider.  Keep all follow-up visits. This is important.  Contact a health care provider if:  You have pain that gets worse or does not get better with medicine.  You have any of these signs of infection:  More redness, swelling, or pain around your incision.  Fluid or blood coming from your incision.  Warmth coming from your incision.  Pus or a bad smell coming from your incision.  A fever.  You have pain or swelling in your calf or leg.  The edges of your incision break open.  You vomit or feel nauseous.  Get help right away if:  You have weakness or numbness in your legs that is new or getting worse.  You have trouble controlling urination or bowel movements.  You develop shortness of breath or chest pain.  You have trouble swallowing.  You have trouble breathing.  These symptoms may represent a serious problem that is an emergency. Do  not wait to see if the symptoms will go away. Get medical help right away. Call your local emergency services (911 in the [place] Do not drive yourself to the hospital.  Summary  After the procedure, it is common to have pain, numbness, weakness, sore throat, and difficulty swallowing.  Follow instructions from your health care provider about how to take care of your incision.  Rest and protect your back and neck as much as possible.  Do breathing exercises as told by your health care provider.  Keep all follow-up visits. This is important.  This information is not intended to replace advice given to you by your health care provider. Make sure you discuss any questions you have with your health care provider.  Document Revised: 04/07/2021 Document Reviewed: 04/07/2021  [employer] Patient Education © 2023 [employer]        Cervical Disk Replacement, Care After  This sheet gives you information about how to care for yourself after your procedure. Your health care provider may also give you more specific instructions. If you have problems or questions, contact your health care provider.  What can I expect after the procedure?  After the procedure, it is common to have:  Pain or soreness.  Some pain, burning, or tingling in one or both arms.  Stiffness.  A hoarse voice.  Constipation.  Decreased appetite.  It may take 6-12 weeks for you to fully recover. You will need to wear a neck collar (cervical collar) to support your neck and keep it from moving. Hard collars are usually worn for about 6-12 weeks. Soft collars are usually worn for about 2 weeks. This may vary among health care providers and hospitals.  Follow these instructions at home:  Medicines  Take over-the-counter and prescription medicines only as told by your health care provider.  Ask your health care provider if the medicine prescribed to you:  Requires you to avoid driving or using heavy machinery.  Can cause constipation. You may need to take these  actions to prevent or treat constipation:  Drink enough fluid to keep your urine pale yellow.  Take over-the-counter or prescription medicines.  Eat foods that are high in fiber, such as beans, whole grains, and fresh fruits and vegetables.  Limit foods that are high in fat and processed sugars, such as fried and sweet foods.  If you have a cervical collar:  Wear it as told by your health care provider. Do not remove it unless told to do so by your health care provider.  Ask your health care provider before making any adjustments to your collar. Small adjustments may be needed over time to improve comfort and reduce pressure on your chin or on the back of your head.  If you are allowed to remove the collar for cleaning and bathing:  Follow instructions from your health care provider about how to safely remove the collar.  Wash and thoroughly dry the skin on your neck. Check your skin for irritation or sores. If you see any, tell your health care provider.  Keep your collar clean by wiping it with mild soap and water and letting it air-dry completely.  If your collar has removable pads, hand-wash them with soap and water and let them air-dry completely.  While your collar is off, do not bring your head to your chest (flex your neck) or lift your chin up high and away from your chest (extend your neck).  Keep long hair outside of the collar.  If your cervical collar is not waterproof:  Do not let it get wet.  Cover it with a watertight covering when you take a bath or shower.  Incision care    Follow instructions from your health care provider about how to take care of your incision. Make sure you:  Wash your hands with soap and water before and after you change your bandage (dressing). If soap and water are not available, use hand .  Change your dressing as told by your health care provider.  Leave stitches (sutures), skin glue, or adhesive strips in place. These skin closures may need to stay in place for 2  weeks or longer. If adhesive strip edges start to loosen and curl up, you may trim the loose edges. Do not remove adhesive strips completely unless your health care provider tells you to do that.  Do not rub the incision area.  Do not get water directly onto your incision area.  Check your incision area every day for signs of infection. Check for:  More redness, swelling, or pain.  Fluid or blood.  Warmth.  Pus or a bad smell.  Managing pain, stiffness, and swelling    If directed, put ice on the affected area. To do this:  Do not remove your cervical collar unless told to do so by your health care provider.  Put ice in a plastic bag.  Place a towel between your skin and the bag or between your cervical collar and the bag.  Leave the ice on for 20 minutes, 2-3 times a day.  Activity    Rest as told by your health care provider.  Avoid sitting for a long time without moving. Get up to take short walks every 1-2 hours. This is important to improve blood flow and breathing. Ask for help if you feel weak or unsteady.  Return to your normal activities as told by your health care provider. Ask your health care provider what activities are safe for you.  Avoid activities that require jumping or a lot of energy for 6-12 weeks, or as long as told by your health care provider. This includes jumping, aerobics, dancing, and most sports.  Do not lift anything that is heavier than 3 lb (1.4 kg), or the limit that you are told, until your health care provider says that it is safe.  If physical therapy was prescribed, do exercises as told by your health care provider or physical therapist.  General instructions  Eat a normal, healthy diet.  Do not take baths, swim, or use a hot tub until your health care provider approves. If your health care provider approves, you may take showers starting 1-3 days after your procedure.  If you have a sore throat, you may take lozenges to help relieve soreness. It may help to eat soft foods for a  few days.  Do not drive any vehicle until your health care provider approves.  Once you are given approval to drive, wear your cervical collar at all times while driving for as long as told by your health care provider.  Do not use any products that contain nicotine or tobacco, such as cigarettes, e-cigarettes, and chewing tobacco. These can delay healing. If you need help quitting, ask your health care provider.  Keep all follow-up visits as told by your health care provider. This is important.  Contact a health care provider if:  You have a fever or chills.  You have pain that does not get better with medicine.  You have difficulty:  Swallowing.  Urinating.  Having a bowel movement.  Your voice changes or becomes hoarse.  You have areas of redness or irritation under your cervical collar.  Get help right away if:  You have more redness, swelling, or pain around your incision.  You have fluid or blood coming from your incision.  Your incision feels warm to the touch.  You have pus or a bad smell coming from your incision.  You have pain, burning, or tingling in one or both of your arms that becomes different or gets worse.  You have pain, redness, warmth, or swelling in your lower leg.  You have difficulty breathing.  Summary  After the procedure, it is common to have pain or soreness, stiffness, a hoarse voice, constipation, and a decreased appetite.  Follow instructions from your health care provider about how to take care of your incision.  Follow instructions from your health care provider about wearing your cervical collar.  Rest as told by your health care provider.  This information is not intended to replace advice given to you by your health care provider. Make sure you discuss any questions you have with your health care provider.  Document Revised: 04/13/2023 Document Reviewed: 05/14/2020  [employer] Patient Education © 2023 [employer]

## 2023-12-20 NOTE — ANESTHESIA POSTPROCEDURE EVALUATION
Patient: Belgica Irizarry    Procedure Summary       Date: 12/20/23 Room / Location: [facility] 04 / SURGERY [facility]    Anesthesia Start: 0700 Anesthesia Stop: 0849    Procedure: C5-6 ANTERIOR PLACEMENT OF ARTIFICIAL DISC (Neck) Diagnosis: (CERVICAL RADICULOPATHY)    Surgeons: Charlie Burkett M.D. Responsible Provider: Hunter Herbert M.D.    Anesthesia Type: general ASA Status: 1            Final Anesthesia Type: general  Last vitals  BP   Blood Pressure: 129/84    Temp   36.1 °C (96.9 °F)    Pulse   97   Resp   (!) 9    SpO2   99 %      Anesthesia Post Evaluation    Patient location during evaluation: PACU  Patient participation: complete - patient participated  Level of consciousness: awake and alert  Pain score: 1    Airway patency: patent  Anesthetic complications: no  Cardiovascular status: hemodynamically stable  Respiratory status: acceptable  Hydration status: euvolemic    PONV: none          No notable events documented.     Nurse Pain Score: 0 (NPRS)

## 2023-12-20 NOTE — OR SURGEON
Immediate Post OP Note    PreOp Diagnosis: cervical stenosis      PostOp Diagnosis: same      Procedure(s):  C5-6 ANTERIOR PLACEMENT OF ARTIFICIAL DISC - Wound Class: Clean with Drain    Surgeon(s):  Charlie Burkett M.D.    Anesthesiologist/Type of Anesthesia:  Anesthesiologist: Hunter Herbert M.D./General    Surgical Staff:  Assistant: LOIS Castro  Circulator: Carolyn Jade R.N.; Marly Prater R.N.  Scrub Person: Blair Huggins; SONA KernNKIMMY  Radiology Technologist: Daylin Luciano    Specimens removed if any:  * No specimens in log *    Estimated Blood Loss: 20cc        Complications: none        12/20/2023 8:31 AM LOIS Castro

## 2023-12-20 NOTE — OR NURSING
0845 Pt arrived from OR. Pt is on simple mask at 6L oxygen and natural airway. Patient is sleepy but answers questions appropriately. Surgical site anterior neck is CDI with dermabond intact. Cardiac rhythm appears to be NSR.  Has 0/10 and reports no nausea.  No belongings with patient in PACU.    0900 Fentanyl and Oxycodone given for pain 7/10. Spouse updated via text.    0920  at bedside for neuro exam and talk with patient.      0951 I called and updated patients spouse (Bhavesh). I will call him once I have a room assignment.    1030 Patient tolerating orals.    1040 Spouse called with room number and update.     1145 Spouse updated in person and phone retreive for patient. Dr. Burkett by to see patient.    1233 [facility].    1241 Report called to Trina GRUBBS on CDU. Family updated via patient on her phine with new room number of 203. Transport requested.    1252  Patient transfer via gurney with stable vital signs to  on 1 O2 via NC.

## 2024-01-08 ENCOUNTER — EH NON-PROVIDER (OUTPATIENT)
Dept: OCCUPATIONAL MEDICINE | Facility: CLINIC | Age: 34
End: 2024-01-08

## 2024-01-08 DIAGNOSIS — Z02.89 ENCOUNTER FOR OCCUPATIONAL HEALTH EXAMINATION INVOLVING RESPIRATOR: ICD-10-CM

## 2024-01-08 PROCEDURE — 94375 RESPIRATORY FLOW VOLUME LOOP: CPT | Performed by: PREVENTIVE MEDICINE

## 2024-02-23 ENCOUNTER — OFFICE VISIT (OUTPATIENT)
Dept: URGENT CARE | Facility: PHYSICIAN GROUP | Age: 34
End: 2024-02-23
Payer: COMMERCIAL

## 2024-02-23 VITALS
BODY MASS INDEX: 22.94 KG/M2 | OXYGEN SATURATION: 99 % | DIASTOLIC BLOOD PRESSURE: 62 MMHG | TEMPERATURE: 98.1 F | RESPIRATION RATE: 16 BRPM | HEART RATE: 89 BPM | HEIGHT: 64 IN | SYSTOLIC BLOOD PRESSURE: 110 MMHG | WEIGHT: 134.4 LBS

## 2024-02-23 DIAGNOSIS — R11.2 NAUSEA AND VOMITING, UNSPECIFIED VOMITING TYPE: ICD-10-CM

## 2024-02-23 DIAGNOSIS — R68.89 FLU-LIKE SYMPTOMS: ICD-10-CM

## 2024-02-23 DIAGNOSIS — R42 VERTIGO: ICD-10-CM

## 2024-02-23 LAB
FLUAV RNA SPEC QL NAA+PROBE: NEGATIVE
FLUBV RNA SPEC QL NAA+PROBE: NEGATIVE
RSV RNA SPEC QL NAA+PROBE: NEGATIVE
SARS-COV-2 RNA RESP QL NAA+PROBE: NEGATIVE

## 2024-02-23 PROCEDURE — 0241U POCT CEPHEID COV-2, FLU A/B, RSV - PCR: CPT | Performed by: PHYSICIAN ASSISTANT

## 2024-02-23 PROCEDURE — 99213 OFFICE O/P EST LOW 20 MIN: CPT | Performed by: PHYSICIAN ASSISTANT

## 2024-02-23 PROCEDURE — 3078F DIAST BP <80 MM HG: CPT | Performed by: PHYSICIAN ASSISTANT

## 2024-02-23 PROCEDURE — 3074F SYST BP LT 130 MM HG: CPT | Performed by: PHYSICIAN ASSISTANT

## 2024-02-23 RX ORDER — ONDANSETRON 4 MG/1
4 TABLET, ORALLY DISINTEGRATING ORAL EVERY 6 HOURS PRN
Qty: 20 TABLET | Refills: 0 | Status: SHIPPED | OUTPATIENT
Start: 2024-02-23

## 2024-02-23 ASSESSMENT — ENCOUNTER SYMPTOMS
NAUSEA: 1
DIZZINESS: 1
ABDOMINAL PAIN: 0
FEVER: 0
CHILLS: 0
VOMITING: 1
CONSTIPATION: 0
WHEEZING: 0
EYE PAIN: 0
SORE THROAT: 0
EYE DISCHARGE: 0
SHORTNESS OF BREATH: 0
COUGH: 0
EYE REDNESS: 0
SINUS PAIN: 0
HEADACHES: 0
DIAPHORESIS: 0
DIARRHEA: 0

## 2024-02-23 ASSESSMENT — FIBROSIS 4 INDEX: FIB4 SCORE: 0.75

## 2024-02-24 NOTE — PROGRESS NOTES
Subjective:     Belgica Irizarry  is a 33 y.o. female who presents for Nausea, Dizziness (X 2 days), Body Aches, and Emesis       She presents today with Nausea and vomiting, dizziness, body aches that been ongoing over the last 2-3 days.  She denies any fevers with her symptoms.  She states that the nausea and vomiting occur independently of the dizziness but the dizziness is worsened when she walks or turns her head.  She denies any change in vision or blurry vision.  No numbness/tingling of her face.  No numbness/tingling/weakness of her extremities from her normal baseline.  No chest pain or shortness of breath.  Has been using Zofran for her nausea and vomiting symptoms.       Review of Systems   Constitutional:  Positive for malaise/fatigue. Negative for chills, diaphoresis and fever.   HENT:  Negative for congestion, ear discharge, sinus pain and sore throat.    Eyes:  Negative for pain, discharge and redness.   Respiratory:  Negative for cough, shortness of breath and wheezing.    Cardiovascular:  Negative for chest pain.   Gastrointestinal:  Positive for nausea and vomiting. Negative for abdominal pain, constipation and diarrhea.   Neurological:  Positive for dizziness. Negative for headaches.      Allergies   Allergen Reactions    Clindamycin Rash     Pt reports that she gets a full body rash    Pcn [Penicillins] Rash     Pt reports that she gets a full body rash     Past Medical History:   Diagnosis Date    Acute kidney injury (HCC) 02/11/2020    Low GFR during ER visit, no additonal findings    Anxiety     Arrhythmia     Paroxysmal atrial tachycardia; Holter monitor placed in 10/2023    Asthma     Bulimia nervosa in full remission 05/27/2021    GERD (gastroesophageal reflux disease)     Migraines     Reactive depression 02/27/2023        Objective:   /62 (BP Location: Right arm, Patient Position: Sitting, BP Cuff Size: Adult)   Pulse 89   Temp 36.7 °C (98.1 °F)   Resp 16   Ht 1.626 m  "(5' 4\")   Wt 61 kg (134 lb 6.4 oz)   SpO2 99%   BMI 23.07 kg/m²   Physical Exam  Vitals and nursing note reviewed.   Constitutional:       General: She is not in acute distress.     Appearance: Normal appearance. She is not ill-appearing, toxic-appearing or diaphoretic.   HENT:      Head: Normocephalic.      Right Ear: Tympanic membrane, ear canal and external ear normal. There is no impacted cerumen.      Left Ear: Tympanic membrane, ear canal and external ear normal. There is no impacted cerumen.      Nose: No congestion or rhinorrhea.      Mouth/Throat:      Mouth: Mucous membranes are moist.      Pharynx: No oropharyngeal exudate or posterior oropharyngeal erythema.   Eyes:      General:         Right eye: No discharge.         Left eye: No discharge.      Extraocular Movements: Extraocular movements intact.      Conjunctiva/sclera: Conjunctivae normal.      Pupils: Pupils are equal, round, and reactive to light.      Comments: Horizontal nystagmus when looking towards the right   Cardiovascular:      Rate and Rhythm: Normal rate and regular rhythm.   Pulmonary:      Effort: Pulmonary effort is normal. No respiratory distress.      Breath sounds: Normal breath sounds. No stridor. No wheezing or rhonchi.   Musculoskeletal:      Cervical back: Neck supple.   Lymphadenopathy:      Cervical: No cervical adenopathy.   Neurological:      General: No focal deficit present.      Mental Status: She is alert and oriented to person, place, and time.      Cranial Nerves: No cranial nerve deficit.      Comments: [place]-Hallpike maneuver positive when patient is looking towards the right, negative when looking towards the left   Psychiatric:         Mood and Affect: Mood normal.         Behavior: Behavior normal.         Thought Content: Thought content normal.         Judgment: Judgment normal.             Diagnostic testing:    Cephid COVID/Influenza/RSV -all negative, notified via [employer] message    Assessment/Plan: "     Encounter Diagnoses   Name Primary?    Vertigo     Flu-like symptoms     Nausea and vomiting, unspecified vomiting type           Plan for care for today's complaint includes Having patient begin meclizine for positional vertigo suspected based on symptom presentation and positive nataliia-hallpike testing when looking towards the left. Epley maneuver discussed and patient instructed to perform this at home with assistance of online tutorial video. Viral testing was negative. Did refill xofran for nausea and vomiting symptoms. Overall patient was well appearing and vital signs were stable. Monitor symptoms and follow up in ER if they worsen or become severe.  Prescription for zofran provided.    Zofran, meclizine, Epley maneuver    See AVS Instructions below for written guidance provided to patient on after-visit management and care in addition to our verbal discussion during the visit.    Please note that this dictation was created using voice recognition software. I have attempted to correct all errors, but there may be sound-alike, spelling, grammar and possibly content errors that I did not discover before finalizing the note.    [place] Self PA-C

## 2024-02-26 RX ORDER — FLUOXETINE 10 MG/1
CAPSULE ORAL
Qty: 31 CAPSULE | Refills: 0 | Status: SHIPPED | OUTPATIENT
Start: 2024-02-26 | End: 2024-04-07

## 2024-04-26 ENCOUNTER — APPOINTMENT (OUTPATIENT)
Dept: RADIOLOGY | Facility: MEDICAL CENTER | Age: 34
End: 2024-04-26
Attending: EMERGENCY MEDICINE
Payer: COMMERCIAL

## 2024-04-26 ENCOUNTER — HOSPITAL ENCOUNTER (INPATIENT)
Facility: MEDICAL CENTER | Age: 34
LOS: 1 days | End: 2024-04-27
Attending: EMERGENCY MEDICINE | Admitting: INTERNAL MEDICINE
Payer: COMMERCIAL

## 2024-04-26 ENCOUNTER — APPOINTMENT (OUTPATIENT)
Dept: RADIOLOGY | Facility: MEDICAL CENTER | Age: 34
End: 2024-04-26
Attending: STUDENT IN AN ORGANIZED HEALTH CARE EDUCATION/TRAINING PROGRAM
Payer: COMMERCIAL

## 2024-04-26 DIAGNOSIS — R29.810 FACIAL DROOP: ICD-10-CM

## 2024-04-26 DIAGNOSIS — I63.9 ACUTE EMBOLIC STROKE (HCC): ICD-10-CM

## 2024-04-26 DIAGNOSIS — R29.898 LEFT ARM WEAKNESS: ICD-10-CM

## 2024-04-26 LAB
ABO GROUP BLD: NORMAL
ALBUMIN SERPL BCP-MCNC: 4.6 G/DL (ref 3.2–4.9)
ALBUMIN/GLOB SERPL: 1.6 G/DL
ALP SERPL-CCNC: 82 U/L (ref 30–99)
ALT SERPL-CCNC: 10 U/L (ref 2–50)
ANION GAP SERPL CALC-SCNC: 12 MMOL/L (ref 7–16)
APTT PPP: 28.3 SEC (ref 24.7–36)
AST SERPL-CCNC: 16 U/L (ref 12–45)
BASOPHILS # BLD AUTO: 0.7 % (ref 0–1.8)
BASOPHILS # BLD: 0.05 K/UL (ref 0–0.12)
BILIRUB SERPL-MCNC: 0.7 MG/DL (ref 0.1–1.5)
BLD GP AB SCN SERPL QL: NORMAL
BUN SERPL-MCNC: 13 MG/DL (ref 8–22)
CALCIUM ALBUM COR SERPL-MCNC: 8.9 MG/DL (ref 8.5–10.5)
CALCIUM SERPL-MCNC: 9.4 MG/DL (ref 8.5–10.5)
CHLORIDE SERPL-SCNC: 106 MMOL/L (ref 96–112)
CO2 SERPL-SCNC: 22 MMOL/L (ref 20–33)
CREAT SERPL-MCNC: 0.73 MG/DL (ref 0.5–1.4)
EKG IMPRESSION: NORMAL
EOSINOPHIL # BLD AUTO: 0.21 K/UL (ref 0–0.51)
EOSINOPHIL NFR BLD: 3.1 % (ref 0–6.9)
ERYTHROCYTE [DISTWIDTH] IN BLOOD BY AUTOMATED COUNT: 41.8 FL (ref 35.9–50)
EST. AVERAGE GLUCOSE BLD GHB EST-MCNC: 103 MG/DL
GFR SERPLBLD CREATININE-BSD FMLA CKD-EPI: 111 ML/MIN/1.73 M 2
GLOBULIN SER CALC-MCNC: 2.9 G/DL (ref 1.9–3.5)
GLUCOSE BLD STRIP.AUTO-MCNC: 71 MG/DL (ref 65–99)
GLUCOSE SERPL-MCNC: 91 MG/DL (ref 65–99)
HBA1C MFR BLD: 5.2 % (ref 4–5.6)
HCG SERPL QL: NEGATIVE
HCT VFR BLD AUTO: 44.8 % (ref 37–47)
HGB BLD-MCNC: 15.3 G/DL (ref 12–16)
IMM GRANULOCYTES # BLD AUTO: 0.01 K/UL (ref 0–0.11)
IMM GRANULOCYTES NFR BLD AUTO: 0.1 % (ref 0–0.9)
INR PPP: 1 (ref 0.87–1.13)
LYMPHOCYTES # BLD AUTO: 2.11 K/UL (ref 1–4.8)
LYMPHOCYTES NFR BLD: 31.2 % (ref 22–41)
MCH RBC QN AUTO: 30.2 PG (ref 27–33)
MCHC RBC AUTO-ENTMCNC: 34.2 G/DL (ref 32.2–35.5)
MCV RBC AUTO: 88.5 FL (ref 81.4–97.8)
MONOCYTES # BLD AUTO: 0.63 K/UL (ref 0–0.85)
MONOCYTES NFR BLD AUTO: 9.3 % (ref 0–13.4)
NEUTROPHILS # BLD AUTO: 3.76 K/UL (ref 1.82–7.42)
NEUTROPHILS NFR BLD: 55.6 % (ref 44–72)
NRBC # BLD AUTO: 0 K/UL
NRBC BLD-RTO: 0 /100 WBC (ref 0–0.2)
PLATELET # BLD AUTO: 247 K/UL (ref 164–446)
PMV BLD AUTO: 10.6 FL (ref 9–12.9)
POTASSIUM SERPL-SCNC: 3.6 MMOL/L (ref 3.6–5.5)
PROT SERPL-MCNC: 7.5 G/DL (ref 6–8.2)
PROTHROMBIN TIME: 13.3 SEC (ref 12–14.6)
RBC # BLD AUTO: 5.06 M/UL (ref 4.2–5.4)
RH BLD: NORMAL
SODIUM SERPL-SCNC: 140 MMOL/L (ref 135–145)
TROPONIN T SERPL-MCNC: <6 NG/L (ref 6–19)
WBC # BLD AUTO: 6.8 K/UL (ref 4.8–10.8)

## 2024-04-26 PROCEDURE — 99291 CRITICAL CARE FIRST HOUR: CPT

## 2024-04-26 PROCEDURE — 71045 X-RAY EXAM CHEST 1 VIEW: CPT

## 2024-04-26 PROCEDURE — 93005 ELECTROCARDIOGRAM TRACING: CPT | Performed by: EMERGENCY MEDICINE

## 2024-04-26 PROCEDURE — 80053 COMPREHEN METABOLIC PANEL: CPT

## 2024-04-26 PROCEDURE — 82962 GLUCOSE BLOOD TEST: CPT

## 2024-04-26 PROCEDURE — 92610 EVALUATE SWALLOWING FUNCTION: CPT

## 2024-04-26 PROCEDURE — 70450 CT HEAD/BRAIN W/O DYE: CPT

## 2024-04-26 PROCEDURE — 770022 HCHG ROOM/CARE - ICU (200)

## 2024-04-26 PROCEDURE — 84484 ASSAY OF TROPONIN QUANT: CPT

## 2024-04-26 PROCEDURE — 70551 MRI BRAIN STEM W/O DYE: CPT

## 2024-04-26 PROCEDURE — 700111 HCHG RX REV CODE 636 W/ 250 OVERRIDE (IP): Performed by: STUDENT IN AN ORGANIZED HEALTH CARE EDUCATION/TRAINING PROGRAM

## 2024-04-26 PROCEDURE — 86850 RBC ANTIBODY SCREEN: CPT

## 2024-04-26 PROCEDURE — 99255 IP/OBS CONSLTJ NEW/EST HI 80: CPT | Performed by: STUDENT IN AN ORGANIZED HEALTH CARE EDUCATION/TRAINING PROGRAM

## 2024-04-26 PROCEDURE — 85025 COMPLETE CBC W/AUTO DIFF WBC: CPT

## 2024-04-26 PROCEDURE — 0042T CT-CEREBRAL PERFUSION ANALYSIS: CPT

## 2024-04-26 PROCEDURE — 700117 HCHG RX CONTRAST REV CODE 255: Performed by: EMERGENCY MEDICINE

## 2024-04-26 PROCEDURE — 86900 BLOOD TYPING SEROLOGIC ABO: CPT

## 2024-04-26 PROCEDURE — 83036 HEMOGLOBIN GLYCOSYLATED A1C: CPT

## 2024-04-26 PROCEDURE — 36415 COLL VENOUS BLD VENIPUNCTURE: CPT

## 2024-04-26 PROCEDURE — 70496 CT ANGIOGRAPHY HEAD: CPT

## 2024-04-26 PROCEDURE — 99291 CRITICAL CARE FIRST HOUR: CPT | Performed by: INTERNAL MEDICINE

## 2024-04-26 PROCEDURE — 85610 PROTHROMBIN TIME: CPT

## 2024-04-26 PROCEDURE — 86901 BLOOD TYPING SEROLOGIC RH(D): CPT

## 2024-04-26 PROCEDURE — 3E03317 INTRODUCTION OF OTHER THROMBOLYTIC INTO PERIPHERAL VEIN, PERCUTANEOUS APPROACH: ICD-10-PCS | Performed by: STUDENT IN AN ORGANIZED HEALTH CARE EDUCATION/TRAINING PROGRAM

## 2024-04-26 PROCEDURE — 94760 N-INVAS EAR/PLS OXIMETRY 1: CPT

## 2024-04-26 PROCEDURE — 85730 THROMBOPLASTIN TIME PARTIAL: CPT

## 2024-04-26 PROCEDURE — 84703 CHORIONIC GONADOTROPIN ASSAY: CPT

## 2024-04-26 PROCEDURE — 700111 HCHG RX REV CODE 636 W/ 250 OVERRIDE (IP): Performed by: INTERNAL MEDICINE

## 2024-04-26 PROCEDURE — 70498 CT ANGIOGRAPHY NECK: CPT

## 2024-04-26 PROCEDURE — 96374 THER/PROPH/DIAG INJ IV PUSH: CPT

## 2024-04-26 RX ORDER — PROMETHAZINE HYDROCHLORIDE 25 MG/1
12.5-25 TABLET ORAL EVERY 4 HOURS PRN
Status: DISCONTINUED | OUTPATIENT
Start: 2024-04-26 | End: 2024-04-27 | Stop reason: HOSPADM

## 2024-04-26 RX ORDER — ONDANSETRON 2 MG/ML
4 INJECTION INTRAMUSCULAR; INTRAVENOUS EVERY 4 HOURS PRN
Status: DISCONTINUED | OUTPATIENT
Start: 2024-04-26 | End: 2024-04-27 | Stop reason: HOSPADM

## 2024-04-26 RX ORDER — ONDANSETRON 4 MG/1
4 TABLET, ORALLY DISINTEGRATING ORAL EVERY 4 HOURS PRN
Status: DISCONTINUED | OUTPATIENT
Start: 2024-04-26 | End: 2024-04-27 | Stop reason: HOSPADM

## 2024-04-26 RX ORDER — PROMETHAZINE HYDROCHLORIDE 25 MG/1
12.5-25 SUPPOSITORY RECTAL EVERY 4 HOURS PRN
Status: DISCONTINUED | OUTPATIENT
Start: 2024-04-26 | End: 2024-04-27 | Stop reason: HOSPADM

## 2024-04-26 RX ORDER — ACETAMINOPHEN 325 MG/1
650 TABLET ORAL EVERY 4 HOURS PRN
Status: DISCONTINUED | OUTPATIENT
Start: 2024-04-26 | End: 2024-04-27 | Stop reason: HOSPADM

## 2024-04-26 RX ORDER — LORAZEPAM 2 MG/ML
1 INJECTION INTRAMUSCULAR
Status: COMPLETED | OUTPATIENT
Start: 2024-04-26 | End: 2024-04-26

## 2024-04-26 RX ORDER — PROCHLORPERAZINE EDISYLATE 5 MG/ML
5-10 INJECTION INTRAMUSCULAR; INTRAVENOUS EVERY 4 HOURS PRN
Status: DISCONTINUED | OUTPATIENT
Start: 2024-04-26 | End: 2024-04-27 | Stop reason: HOSPADM

## 2024-04-26 RX ADMIN — IOHEXOL 80 ML: 350 INJECTION, SOLUTION INTRAVENOUS at 08:45

## 2024-04-26 RX ADMIN — LORAZEPAM 1 MG: 2 INJECTION INTRAMUSCULAR; INTRAVENOUS at 11:22

## 2024-04-26 RX ADMIN — TENECTEPLASE 14 MG: KIT at 08:36

## 2024-04-26 RX ADMIN — IOHEXOL 40 ML: 350 INJECTION, SOLUTION INTRAVENOUS at 08:45

## 2024-04-26 ASSESSMENT — COGNITIVE AND FUNCTIONAL STATUS - GENERAL
SUGGESTED CMS G CODE MODIFIER MOBILITY: CH
MOBILITY SCORE: 24
SUGGESTED CMS G CODE MODIFIER DAILY ACTIVITY: CH
DAILY ACTIVITIY SCORE: 24

## 2024-04-26 ASSESSMENT — ENCOUNTER SYMPTOMS
CONSTITUTIONAL NEGATIVE: 1
RESPIRATORY NEGATIVE: 1
TINGLING: 1
HEADACHES: 0
GASTROINTESTINAL NEGATIVE: 1
CARDIOVASCULAR NEGATIVE: 1
FOCAL WEAKNESS: 1
EYES NEGATIVE: 1
PSYCHIATRIC NEGATIVE: 1
MUSCULOSKELETAL NEGATIVE: 1

## 2024-04-26 ASSESSMENT — LIFESTYLE VARIABLES
HOW MANY TIMES IN THE PAST YEAR HAVE YOU HAD 5 OR MORE DRINKS IN A DAY: 0
ALCOHOL_USE: YES
DOES PATIENT WANT TO STOP DRINKING: NO
AVERAGE NUMBER OF DAYS PER WEEK YOU HAVE A DRINK CONTAINING ALCOHOL: 1
TOTAL SCORE: 0
EVER FELT BAD OR GUILTY ABOUT YOUR DRINKING: NO
EVER HAD A DRINK FIRST THING IN THE MORNING TO STEADY YOUR NERVES TO GET RID OF A HANGOVER: NO
CONSUMPTION TOTAL: NEGATIVE
ON A TYPICAL DAY WHEN YOU DRINK ALCOHOL HOW MANY DRINKS DO YOU HAVE: 1
TOTAL SCORE: 0
HAVE PEOPLE ANNOYED YOU BY CRITICIZING YOUR DRINKING: NO
HAVE YOU EVER FELT YOU SHOULD CUT DOWN ON YOUR DRINKING: NO
TOTAL SCORE: 0

## 2024-04-26 ASSESSMENT — PATIENT HEALTH QUESTIONNAIRE - PHQ9
1. LITTLE INTEREST OR PLEASURE IN DOING THINGS: NOT AT ALL
2. FEELING DOWN, DEPRESSED, IRRITABLE, OR HOPELESS: NOT AT ALL
SUM OF ALL RESPONSES TO PHQ9 QUESTIONS 1 AND 2: 0

## 2024-04-26 ASSESSMENT — FIBROSIS 4 INDEX: FIB4 SCORE: 0.68

## 2024-04-26 NOTE — ASSESSMENT & PLAN NOTE
Suspect embolic stroke  Migraine this am but left arm tingling and numbness with drip on exam and left facial droop (started at 7:15 am)  CT brain and CTA no hemm. Or large vessel infarct  Neurology consulted  TPA given 8:37 am   Post TPA Stroke protocol  Check lipid panel and HBA1c  No previous hx of strokes or family hx  Will need to assess re: eating as pt very hungry.  Assess at 2 hr silke  Neuro checks per protocol

## 2024-04-26 NOTE — CONSULTS
Neurology Initial Consult H&P  Neurohospitalist Service, [facility]    Referring Physician: Shayna Turner M.D.    No chief complaint on file.      HPI: Belgica Irizarry is a 33 y.o. F w/ PMHx of migraines w/ aura who presents to [facility] for evaluation of headhead and left face, arm weakness and sensory changes. Patient reportedly woke up with a headache around 06:00 this AM. She took migraine medication (Ubrevy) at 06:45. Around 07:00 she began to feel tingling in her left face and when her  assessed her they noted she had subtle left facial droop. They then immediately presented to the hospital for evaluation.     Review of systems: In addition to what is detailed in the HPI above, all other systems reviewed and are negative.    Past Medical History:    has a past medical history of Acute kidney injury (HCC) (02/11/2020), Anxiety, Arrhythmia, Asthma, Bulimia nervosa in full remission (05/27/2021), GERD (gastroesophageal reflux disease), Migraines, and Reactive depression (02/27/2023).    She has no past medical history of Acute nasopharyngitis, Addisons disease (Formerly [facility]), Adrenal disorder (Formerly [facility]), Allergy, Anemia, Anesthesia, Anginal syndrome ([facility]ly [facility]), Arthritis, Blood clotting disorder ([facility]ly [facility]), Blood transfusion without reported diagnosis, Bowel habit changes, Breath shortness, Bronchitis, CAD (coronary artery disease), Cancer ([facility]ly [facility]), Carcinoma in situ of respiratory system, Cataract, Chronic obstructive pulmonary disease (HCC), Clotting disorder (HCC), Congestive heart failure (HCC), Continuous ambulatory peritoneal dialysis status (Formerly [facility]), Coughing blood, Cushings syndrome ([facility]ly [facility]), Dental disorder, Diabetes (HCC), Diabetic neuropathy (HCC), Dialysis patient (Formerly [facility]), Disorder of thyroid, Emphysema of lung (HCC), Glaucoma, Goiter, Gynecological disorder, Head ache, Heart attack (HCC), Heart burn, Heart murmur, Heart valve disease, Hemorrhagic disorder ([facility]ly [facility]), Hepatitis A, Hepatitis B, Hepatitis  C, Hiatus hernia syndrome, High cholesterol, HIV (human immunodeficiency virus infection) (HCC), Hyperlipidemia, Hypertension, IBD (inflammatory bowel disease), Indigestion, Infectious disease, Jaundice, Liver disease, Meningitis, Muscle disorder, Myocardial infarct (HCC), Osteoporosis, Pacemaker, Parathyroid disorder (HCC), Pituitary disease (HCC), Pneumonia, Pregnant, Pulmonary emphysema (HCC), Rheumatic fever, Seizure (HCC), Seizure disorder (HCC), Sickle cell disease (HCC), Sleep apnea, Snoring, Stroke (HCC), Substance abuse (HCC), Thyroid disease, Tuberculosis, Urinary bladder disorder, Urinary incontinence, or Urinary tract infection.    FHx:  family history includes Arterial Aneurysm in her paternal grandmother and paternal uncle; Cancer in her father and paternal grandfather; Drug abuse in her paternal uncle; Heart Disease in her paternal grandfather; Hypertension in her mother; Lung Disease in her paternal grandmother; Psychiatric Illness in her maternal aunt and maternal grandfather; Stroke in her paternal grandfather.    SHx:   reports that she has never smoked. She has never used smokeless tobacco. She reports current alcohol use. She reports that she does not use drugs.    Allergies:  Allergies   Allergen Reactions    Clindamycin Rash     Pt reports that she gets a full body rash    Pcn [Penicillins] Rash     Pt reports that she gets a full body rash       Medications:  No current facility-administered medications for this encounter.    Current Outpatient Medications:     ondansetron (ZOFRAN ODT) 4 MG TABLET DISPERSIBLE, Take 1 Tablet by mouth every 6 hours as needed for Nausea/Vomiting for up to 20 doses., Disp: 20 Tablet, Rfl: 0    tizanidine (ZANAFLEX) 2 MG tablet, Take 2 mg by mouth 1 time a day as needed (spasm)., Disp: , Rfl:     Ubrogepant 50 MG Tab, Take 1 Tablet by mouth as needed (at onset of migraine-may repeat dose after 2 hours. Maximum: 200 mg per 24 hours)., Disp: 30 Tablet, Rfl: 0     Prenatal Vit-Fe Fumarate-FA (PRENATAL VITAMIN PO), Take 1 Tablet by mouth every day., Disp: , Rfl:     EPINEPHrine (EPIPEN) 0.3 MG/0.3ML Solution Auto-injector solution for injection, Inject 0.3 mg into the shoulder, thigh, or buttocks one time., Disp: , Rfl:       NEUROLOGICAL EXAM:     MENTAL STATUS: Awake, alert and oriented to person, place, time   LANG/SPEECH: Naming and repetition intact, fluent speech, follows simple commands.    CRANIAL NERVES:  II: Pupils equal and reactive, no VF deficits  III, IV, VI: EOM intact, no gaze preference or deviation, no nystagmus.  V: Mild sensory changes in Lt compared to Rt, 80% compared to normal Rt  VII: Mild left facial asymmetry, subtle left lip droop  VIII: normal hearing to speech  IX, X: normal palatal elevation, no uvular deviation  XI: 5/5 head turn and 5/5 shoulder shrug bilaterally  XII: midline tongue protrusion    MOTOR: Minor LUE drift, 5-/5 strength in LUE. Intact strength and antigravity movement throughout    REFLEXES:bilateral flexor plantar response, no clonus  SENSORY: Minor sensory changes in LUE compared to RUE, 70-80% compared to normal Rt. Equal sensation in BL LE. No DSS  COORD: Normal finger to nose, no tremor, no dysmetria  GAIT: Deferred      NIHSS: National Institutes of Health Stroke Scale    [0] 1a:Level of Consciousness    0-alert 1-drowsy   2-stupor   3-coma  [0] 1b:LOC Questions                  0-both  1-one      2-neither  [0] 1c:LOC Commands                   0-both  1-one      2-neither  [0] 2: Best Gaze                     0-nl    1-partial  2-forced  [0] 3: Visual Fields                   0-nl    1-partial  2-complete 3-bilat  [1] 4: Facial Paresis                0-nl    1-minor    2-partial  3-full  MOTOR                       0-nl  [0] 5: Right Arm           1-drift  [1] 6: Left Arm             2-some effort vs gravity  [0] 7: Right Leg           3-no effort vs gravity  [0] 8: Left Leg             4-no movement                              x-untestable  [0] 9: Limb Ataxia                    0-abs   1-1_limb   2-2+_limbs       x-untestable  [1] 10:Sensory                        0-nl    1-partial  2-dense  [0] 11:Best Language/Aphasia         0-nl    1-mild/mod 2-severe   3-mute  [0] 12:Dysarthria                     0-nl    1-mild/mod 2-severe       x-untestable  [0] 13:Neglect/Inattention            0-none  1-partial  2-complete  [3] TOTAL      Objective Data:    Labs:  Lab Results   Component Value Date/Time    PROTHROMBTM 13.2 11/30/2023 09:03 AM    INR 0.99 11/30/2023 09:03 AM      Lab Results   Component Value Date/Time    WBC 7.3 11/30/2023 09:03 AM    RBC 5.14 11/30/2023 09:03 AM    HEMOGLOBIN 15.6 11/30/2023 09:03 AM    HEMATOCRIT 46.7 11/30/2023 09:03 AM    MCV 90.9 11/30/2023 09:03 AM    MCH 30.4 11/30/2023 09:03 AM    MCHC 33.4 11/30/2023 09:03 AM    MPV 11.1 11/30/2023 09:03 AM    NEUTSPOLYS 57.70 11/30/2023 09:03 AM    LYMPHOCYTES 29.10 11/30/2023 09:03 AM    MONOCYTES 8.80 11/30/2023 09:03 AM    EOSINOPHILS 3.40 11/30/2023 09:03 AM    BASOPHILS 0.70 11/30/2023 09:03 AM      Lab Results   Component Value Date/Time    SODIUM 140 11/30/2023 09:03 AM    POTASSIUM 4.1 11/30/2023 09:03 AM    CHLORIDE 103 11/30/2023 09:03 AM    CO2 28 11/30/2023 09:03 AM    GLUCOSE 57 (L) 11/30/2023 09:03 AM    BUN 18 11/30/2023 09:03 AM    CREATININE 0.89 11/30/2023 09:03 AM      Lab Results   Component Value Date/Time    CHOLSTRLTOT 173 04/01/2022 09:08 AM     (H) 04/01/2022 09:08 AM    HDL 51 04/01/2022 09:08 AM    TRIGLYCERIDE 67 04/01/2022 09:08 AM       Lab Results   Component Value Date/Time    ALKPHOSPHAT 76 04/01/2022 09:08 AM    ASTSGOT 21 04/01/2022 09:08 AM    ALTSGPT 15 04/01/2022 09:08 AM    TBILIRUBIN 0.4 04/01/2022 09:08 AM        Imaging/Testing:    I interpreted and/or reviewed the patient's neuroimaging    DX-CHEST-PORTABLE (1 VIEW)    (Results Pending)   CT-HEAD W/O    (Results Pending)   CT-CEREBRAL PERFUSION ANALYSIS    (Results  Pending)   CT-CTA HEAD WITH & W/O-POST PROCESS    (Results Pending)   CT-CTA NECK WITH & W/O-POST PROCESSING    (Results Pending)       Assessment and Plan:  33 y.o. F w/ PMHx of migraines w/ aura who presents to [facility] for evaluation of headhead and left face, arm weakness and sensory changes.  Has history of migraine with visual aura, but no history of sensory or motor symptoms. CTH, CTA, CTP imaging demonstrates no hemorrhage, vessel occlusion, or abnormal perfusion changes. NIH 3 for left face, and arm sensory motor changes. Discussed with patient risks and benefits of TNK, confirmed that she did believe symptoms would be disabling for here and proceeded with IV-TNK administration, dose given ~8:40. Symptoms most consistent with either small vessel ischemia or migraine with sensory and motor aura. Follow up MRI and stroke diagnostics should provide clarity. Will treat as small vessel ischemia at this time.     Problem list:  -- Migraine w/ aura  -- Lf-facial droop  -- Lt-hemibody sensory changes  -- Lt-UE drift    Recommendations:  -- Neurochecks/NIHSS per post-TNK protocol  -- Expedite MRI brain without contrast, does not need to wait 24 hours  -- Repeat head CT only if there is clinical deterioration  -- maintain -180 strict, cardene PRN.  DBP goal is .  DO NOT allow BP to drop below stated goal as at risk for stroke related to cerebral hypoperfersion   -- Long term BP goal <130/80  -- Stroke labs: HgbA1c and lipid panel  -- Start atorvastatin 40 mg qHS; titrate dose to results of lipid panel, goal LDL <70  -- No antithrombotic therapy, SCDs only for at least 24 hours;  future antithrombotic therapy and timing TBD pending MRI results  -- TTE w/o bubble  -- Zio patch monitor on discharge  -- Stroke bridge clinic follow up on discharge  -- SCDs for DVT ppx  -- PT/OT/SLP ok even within first 24 hours      Vickey Narvaez III, MD  ABPN Board Certified in Neurology  Vascular Neurology, [facility]  Acute Care Services

## 2024-04-26 NOTE — H&P
"Pulmonary History & Physical Note    Date of Service  4/26/2024    Primary Care Physician  GABINO Arauz.    Consultants  neurology    Specialist Names: Dr. Mazariegos    Code Status  Full Code    Chief Complaint  Chief Complaint   Patient presents with    Possible Stroke     Woke this am at 0600 with headache feeling similar to past migraines. At 0715 she noticed left facial droop, \"tingling\" left arm.        History of Presenting Illness  Belgica Irizarry is a 33 y.o. female who presented 4/26/2024 with hx of migraines.  Woke up at 6 am with headache thought it was her migraine took her medication, Ubrevy.  Headache now resolved  Developed left sided numbness on face and extremities 7: 15 am   Neurology examined and pt given on alteplase 8:37 am  + left arm drift, left sided facial droop and weakness on left side  Pt did have a viral illness a week ago with nasal congestion and recovered.    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 2/2024 - Dr. Tenorio); migraines since age 19, anxiety, insomnia, chronic headaches s/p occipital nerve injection in 12/2022, GERD    I discussed the plan of care with patient, her  Dr. Turner, Dr. Narvaez    Review of Systems  Review of Systems   Constitutional: Negative.    HENT: Negative.     Eyes: Negative.    Respiratory: Negative.     Cardiovascular: Negative.    Gastrointestinal: Negative.    Genitourinary: Negative.    Musculoskeletal: Negative.    Skin: Negative.    Neurological:  Positive for tingling and focal weakness (left arm tingling, slight weakness). Negative for headaches.   Endo/Heme/Allergies: Negative.    Psychiatric/Behavioral: Negative.         Past Medical History   has a past medical history of Acute embolic stroke (HCC) (4/26/2024), Acute kidney injury (HCC) (02/11/2020), Anxiety, " Arrhythmia, Asthma, Bulimia nervosa in full remission (05/27/2021), GERD (gastroesophageal reflux disease), Migraines, and Reactive depression (02/27/2023).    Surgical History   has a past surgical history that includes pr inj cerv/thorac,w/ imaging (N/A, 10/27/2021); other; and cervical disk and fusion anterior (12/20/2023).     Family History  family history includes Arterial Aneurysm in her paternal grandmother and paternal uncle; Cancer in her father and paternal grandfather; Drug abuse in her paternal uncle; Heart Disease in her paternal grandfather; Hypertension in her mother; Lung Disease in her paternal grandmother; Psychiatric Illness in her maternal aunt and maternal grandfather; Stroke in her paternal grandfather.   Family history reviewed with patient. There is no family history that is pertinent to the chief complaint.     Social History   reports that she has never smoked. She has never used smokeless tobacco. She reports current alcohol use. She reports that she does not use drugs.    Allergies  Allergies   Allergen Reactions    Clindamycin Rash     Pt reports that she gets a full body rash    Pcn [Penicillins] Rash     Pt reports that she gets a full body rash       Medications  Prior to Admission Medications   Prescriptions Last Dose Informant Patient Reported? Taking?   EPINEPHrine (EPIPEN) 0.3 MG/0.3ML Solution Auto-injector solution for injection  Patient Yes No   Sig: Inject 0.3 mg into the shoulder, thigh, or buttocks one time.   Prenatal Vit-Fe Fumarate-FA (PRENATAL VITAMIN PO)  Patient Yes No   Sig: Take 1 Tablet by mouth every day.   Ubrogepant 50 MG Tab 4/26/2024 Patient No Yes   Sig: Take 1 Tablet by mouth as needed (at onset of migraine-may repeat dose after 2 hours. Maximum: 200 mg per 24 hours).   ondansetron (ZOFRAN ODT) 4 MG TABLET DISPERSIBLE   No No   Sig: Take 1 Tablet by mouth every 6 hours as needed for Nausea/Vomiting for up to 20 doses.   tizanidine (ZANAFLEX) 2 MG tablet   "Patient Yes No   Sig: Take 2 mg by mouth 1 time a day as needed (spasm).      Facility-Administered Medications: None       Physical Exam  Temp:  [36.5 °C (97.7 °F)] 36.5 °C (97.7 °F)  Pulse:  [69-79] 72  Resp:  [16-18] 16  BP: (115-138)/(55-91) 116/79  SpO2:  [98 %-99 %] 98 %  Blood Pressure: 138/84   Temperature: 36.5 °C (97.7 °F)   Pulse: 69   Respiration: 17   Pulse Oximetry: 98 %       Physical Exam  Constitutional:       General: She is not in acute distress.     Appearance: Normal appearance.   HENT:      Head: Normocephalic and atraumatic.      Mouth/Throat:      Mouth: Mucous membranes are moist.   Eyes:      Pupils: Pupils are equal, round, and reactive to light.   Cardiovascular:      Rate and Rhythm: Normal rate.   Pulmonary:      Effort: Pulmonary effort is normal.      Breath sounds: Normal breath sounds.   Musculoskeletal:         General: Normal range of motion.      Cervical back: Normal range of motion.      Right lower leg: No edema.      Left lower leg: No edema.   Skin:     General: Skin is warm and dry.   Neurological:      Mental Status: She is alert and oriented to person, place, and time.      Comments: LUE drift  Left facial  droop and left eye lid weakness  Otherwise rest of neuro exam normal   Psychiatric:         Mood and Affect: Mood normal.         Behavior: Behavior normal.         Thought Content: Thought content normal.         Judgment: Judgment normal.         Laboratory:  Recent Labs     04/26/24  0758   WBC 6.8   RBC 5.06   HEMOGLOBIN 15.3   HEMATOCRIT 44.8   MCV 88.5   MCH 30.2   MCHC 34.2   RDW 41.8   PLATELETCT 247   MPV 10.6     Recent Labs     04/26/24  0758   SODIUM 140   POTASSIUM 3.6   CHLORIDE 106   CO2 22   GLUCOSE 91   BUN 13   CREATININE 0.73   CALCIUM 9.4     Recent Labs     04/26/24  0758   ALTSGPT 10   ASTSGOT 16   ALKPHOSPHAT 82   TBILIRUBIN 0.7   GLUCOSE 91     Recent Labs     04/26/24  0758   APTT 28.3   INR 1.00     No results for input(s): \"NTPROBNP\" in the " "last 72 hours.      No results for input(s): \"TROPONINT\" in the last 72 hours.    Imaging:  DX-CHEST-PORTABLE (1 VIEW)   Final Result      No acute cardiac or pulmonary abnormalities are identified.      CT-CTA NECK WITH & W/O-POST PROCESSING   Final Result      CT angiogram of the neck within normal limits.      CT-CTA HEAD WITH & W/O-POST PROCESS   Final Result      CT angiogram of the [place] within normal limits.      CT-CEREBRAL PERFUSION ANALYSIS   Final Result      1.  Cerebral blood flow less than 30% likely representing completed infarct = 0 mL.      2.  T Max more than 6 seconds likely representing combination of completed infarct and ischemia = 0 mL.      3.  Mismatched volume likely representing ischemic brain/penumbra = None      4.  Please note that the cerebral perfusion was performed on the limited brain tissue around the basal ganglia region. Infarct/ischemia outside the CT perfusion sections can be missed in this study.      CT-HEAD W/O   Final Result      There is no acute intracranial abnormality.           Pending cxr and ekg    Assessment/Plan:  Justification for Admission Status  I anticipate this patient will require at least two midnights for appropriate medical management, necessitating inpatient admission because acute stroke s/p TPA    Patient will need a ICU (Adult and Pediatrics) bed on EMERGENCY service .  The need is secondary to stroke s/p TPA.    * Acute embolic stroke (HCC)- (present on admission)  Assessment & Plan  Suspect embolic stroke  Migraine this am but left arm tingling and numbness with drip on exam and left facial droop (started at 7:15 am)  CT brain and CTA no hemm. Or large vessel infarct  Neurology consulted  TPA given 8:37 am   Post TPA Stroke protocol  Check lipid panel and HBA1c  No previous hx of strokes or family hx  Will need to assess re: eating as pt very hungry.  Assess at 2 hr silke  Neuro checks per protocol        VTE prophylaxis: SCDs/TEDs    CC " time 31 mins which does not include any procedural time

## 2024-04-26 NOTE — ED PROVIDER NOTES
"ED Provider Note    CHIEF COMPLAINT  Chief Complaint   Patient presents with    Possible Stroke     Woke this am at 0600 with headache feeling similar to past migraines. At 0715 she noticed left facial droop, \"tingling\" left arm.        EXTERNAL RECORDS REVIEWED  Other none    HPI/ROS  LIMITATION TO HISTORY   Select: : None    OUTSIDE HISTORIAN(S):  Significant other patient's  at the charge desk and contributes to the history    Belgica Irizarry is a 33 y.o. female with history of migraines who presents for evaluation of headache, left facial numbness, and left upper extremity numbness.    Patient states she awoke at 6 AM with a headache.  When she went to stand up, she felt nauseous.  At 715 she felt left facial numbness.  Her  noted left eye droop and noted that she was able to elevate her left eyebrow normally.    Patient gets migraines once per month usually.  They are located on left side of her head.    Blood pressure at triage was more elevated at 138/84 than her baseline which is usually in the 110s.    Patient took a home pregnancy test yesterday which was negative.    Patient denies history of hypertension, clotting disorder, current HRT/OCP use, smoking.      PAST MEDICAL HISTORY   has a past medical history of Acute embolic stroke (HCC) (4/26/2024), Acute kidney injury (HCC) (02/11/2020), Anxiety, Arrhythmia, Asthma, Bulimia nervosa in full remission (05/27/2021), GERD (gastroesophageal reflux disease), Migraines, and Reactive depression (02/27/2023).    SURGICAL HISTORY   has a past surgical history that includes inj cerv/thorac,w/ imaging (N/A, 10/27/2021); other; and cervical disk and fusion anterior (12/20/2023).    FAMILY HISTORY  Family History   Problem Relation Age of Onset    Hypertension Mother     Cancer Father         Bladder cancer    Psychiatric Illness Maternal Grandfather         Bipolar    Cancer Paternal Grandfather         Melanoma    Heart Disease Paternal " "Grandfather     Stroke Paternal Grandfather     Lung Disease Paternal Grandmother     Arterial Aneurysm Paternal Grandmother     Psychiatric Illness Maternal Aunt         Bipolar    Arterial Aneurysm Paternal Uncle     Drug abuse Paternal Uncle        SOCIAL HISTORY  Social History     Tobacco Use    Smoking status: Never    Smokeless tobacco: Never   Vaping Use    Vaping Use: Never used   Substance and Sexual Activity    Alcohol use: Yes     Comment: occasional    Drug use: No    Sexual activity: Yes     Partners: Male     Birth control/protection: None       CURRENT MEDICATIONS  Home Medications       Reviewed by Rebel Peres (Pharmacy Tech) on 04/26/24 at 0902  Med List Status: Complete     Medication Last Dose Status   ondansetron (ZOFRAN ODT) 4 MG TABLET DISPERSIBLE WEEKS AGO Active   Prenatal Vit-Fe Fumarate-FA (PRENATAL VITAMIN PO) 4/25/2024 Active   Ubrogepant 50 MG Tab 4/26/2024 Active                    ALLERGIES  Allergies   Allergen Reactions    Clindamycin Rash     Pt reports that she gets a full body rash    Pcn [Penicillins] Rash     Pt reports that she gets a full body rash       PHYSICAL EXAM  VITAL SIGNS: BP (!) 126/91   Pulse 74   Temp 36.5 °C (97.7 °F) (Temporal)   Resp 16   Ht 1.626 m (5' 4\")   Wt 57.2 kg (126 lb)   SpO2 99%   BMI 21.63 kg/m²    General:  WDWN female, slightly anxious appearing; A+Ox3; V/S as above  Skin: warm and dry; good color; no rash  HEENT: NCAT; EOMs intact; PERRL; no scleral icterus   Neck: FROM; no stridor  Cardiovascular: Regular heart rate and rhythm.  No murmurs, rubs, or gallops; pulses 2+ bilaterally radially  Lungs: No respiratory distress or tachypnea; Clear to auscultation with good air movement bilaterally.  No wheezes, rhonchi, or rales.   Extremities: RUSH x 4; no e/o trauma; no pedal edema  Neurologic: Weakness of the left eyelid/decreased closing; eyebrows do not rise symmetrically; left facial droop noted with smile; reduced sensation in the " left cheek; speech clear; left upper extremity drift; lower extremity sensation intact  Psychiatric: Appropriate affect, normal mood      EKG/LABS  Results for orders placed or performed during the hospital encounter of 04/26/24   CBC WITH DIFFERENTIAL   Result Value Ref Range    WBC 6.8 4.8 - 10.8 K/uL    RBC 5.06 4.20 - 5.40 M/uL    Hemoglobin 15.3 12.0 - 16.0 g/dL    Hematocrit 44.8 37.0 - 47.0 %    MCV 88.5 81.4 - 97.8 fL    MCH 30.2 27.0 - 33.0 pg    MCHC 34.2 32.2 - 35.5 g/dL    RDW 41.8 35.9 - 50.0 fL    Platelet Count 247 164 - 446 K/uL    MPV 10.6 9.0 - 12.9 fL    Neutrophils-Polys 55.60 44.00 - 72.00 %    Lymphocytes 31.20 22.00 - 41.00 %    Monocytes 9.30 0.00 - 13.40 %    Eosinophils 3.10 0.00 - 6.90 %    Basophils 0.70 0.00 - 1.80 %    Immature Granulocytes 0.10 0.00 - 0.90 %    Nucleated RBC 0.00 0.00 - 0.20 /100 WBC    Neutrophils (Absolute) 3.76 1.82 - 7.42 K/uL    Lymphs (Absolute) 2.11 1.00 - 4.80 K/uL    Monos (Absolute) 0.63 0.00 - 0.85 K/uL    Eos (Absolute) 0.21 0.00 - 0.51 K/uL    Baso (Absolute) 0.05 0.00 - 0.12 K/uL    Immature Granulocytes (abs) 0.01 0.00 - 0.11 K/uL    NRBC (Absolute) 0.00 K/uL   COMP METABOLIC PANEL   Result Value Ref Range    Sodium 140 135 - 145 mmol/L    Potassium 3.6 3.6 - 5.5 mmol/L    Chloride 106 96 - 112 mmol/L    Co2 22 20 - 33 mmol/L    Anion Gap 12.0 7.0 - 16.0    Glucose 91 65 - 99 mg/dL    Bun 13 8 - 22 mg/dL    Creatinine 0.73 0.50 - 1.40 mg/dL    Calcium 9.4 8.5 - 10.5 mg/dL    Correct Calcium 8.9 8.5 - 10.5 mg/dL    AST(SGOT) 16 12 - 45 U/L    ALT(SGPT) 10 2 - 50 U/L    Alkaline Phosphatase 82 30 - 99 U/L    Total Bilirubin 0.7 0.1 - 1.5 mg/dL    Albumin 4.6 3.2 - 4.9 g/dL    Total Protein 7.5 6.0 - 8.2 g/dL    Globulin 2.9 1.9 - 3.5 g/dL    A-G Ratio 1.6 g/dL   PROTHROMBIN TIME   Result Value Ref Range    PT 13.3 12.0 - 14.6 sec    INR 1.00 0.87 - 1.13   APTT   Result Value Ref Range    APTT 28.3 24.7 - 36.0 sec   COD (ADULT)   Result Value Ref Range     ABO Grouping Only A     Rh Grouping Only POS     Antibody Screen-Cod NEG    TROPONIN   Result Value Ref Range    Troponin T <6 6 - 19 ng/L   ESTIMATED GFR   Result Value Ref Range    GFR (CKD-EPI) 111 >60 mL/min/1.73 m 2   Hemoglobin A1C   Result Value Ref Range    Glycohemoglobin 5.2 4.0 - 5.6 %    Est Avg Glucose 103 mg/dL   HCG QUAL SERUM   Result Value Ref Range    Beta-Hcg Qualitative Serum Negative Negative   EKG (NOW)   Result Value Ref Range    Report       [facility]    Test Date:  2024  Pt Name:    ROBBY HOBBS            Department: ER  MRN:        [id number]                      Room:        11  Gender:     Female                       Technician: [id number]  :        1990                   Requested By:RACHAEL PUENTE  Order #:    [id number]                    Reading MD: RACHAEL PUENTE MD    Measurements  Intervals                                Axis  Rate:       71                           P:          47  SC:         176                          QRS:        26  QRSD:       93                           T:          34  QT:         381  QTc:        414    Interpretive Statements  Sinus rhythm  Probable left atrial enlargement  Minimal ST depression  Compared to ECG 2023 09:10:44  ST (T wave) deviation now present  Electronically Signed On 2024 09:04:49 PDT by RACHAEL PUENTE MD     POCT glucose device results   Result Value Ref Range    POC Glucose, Blood 71 65 - 99 mg/dL       I have independently interpreted this EKG    RADIOLOGY/PROCEDURES   I have independently interpreted the diagnostic imaging associated with this visit and am waiting the final reading from the radiologist.   My preliminary interpretation is as follows:   No ICH    Radiologist interpretation:  CT-CTA NECK WITH & W/O-POST PROCESSING   Final Result      CT angiogram of the neck within normal limits.      CT-CTA HEAD WITH & W/O-POST PROCESS   Final Result      CT  angiogram of the [place] within normal limits.      CT-CEREBRAL PERFUSION ANALYSIS   Final Result      1.  Cerebral blood flow less than 30% likely representing completed infarct = 0 mL.      2.  T Max more than 6 seconds likely representing combination of completed infarct and ischemia = 0 mL.      3.  Mismatched volume likely representing ischemic brain/penumbra = None      4.  Please note that the cerebral perfusion was performed on the limited brain tissue around the basal ganglia region. Infarct/ischemia outside the CT perfusion sections can be missed in this study.      CT-HEAD W/O   Final Result      There is no acute intracranial abnormality.         DX-CHEST-PORTABLE (1 VIEW)    (Results Pending)       COURSE & MEDICAL DECISION MAKING    ASSESSMENT, COURSE AND PLAN  Care Narrative: This is a 33-year-old female with history of migraines who presents for headache, left facial droop and upper extremity numbness and weakness.    Patient was met at the charge desk as a stroke alert.  CT/CTA and perfusion studies were ordered.    NIHSS 4    Neurology will see    EKG shows no A-fib or acute ST changes.    8:40 AM  Patient reevaluated.  Patient's symptoms are persistent.  Blood pressure currently 115 systolic.  Neurology is at the bedside.  Pharmacy is at the bedside.  TNKase being administered.  Blood pressure currently controlled.    Paging intensivist.    8:45 AM  I discussed the case with Dr. Zhang from the ICU who agrees to admit the patient.         DISPOSITION AND DISCUSSIONS  I have discussed management of the patient with the following physicians and HANNAH's:  Neurology  Leatha/ICU     Decision tools and prescription drugs considered including, but not limited to:  NIHSS 4 .    FINAL DIAGNOSIS  1. Facial droop    2. Left arm weakness    3. Acute embolic stroke (HCC)        Electronically signed by: Shayna Turner M.D., 4/26/2024 8:03 AM

## 2024-04-26 NOTE — ED NOTES
"Chief Complaint   Patient presents with    Possible Stroke     Woke this am at 0600 with headache feeling similar to past migraines. At 0715 she noticed left facial droop, \"tingling\" left arm.      Presents from triage and evaluated by ERP at charge desk and paged as stroke alert. +mild left facial droop, Mild left arm drift and left arm sensation deficit that she describes as 60% of normal compared to right side. To CT with RN then to red 11. Report to Anibal GRUBBS. FSBS 71mg/dL. Neuro MD at bedside.   "

## 2024-04-26 NOTE — DISCHARGE PLANNING
[facility] Transitional [facility] Coordination    Referral from: Dr. LiuLeatha    Insurance Provider on Facesheet: [facility]    Potential Rehab Diagnosis: CVA    Chart review indicates patient may have on going medical management and may have therapy needs to possibly meet inpatient rehab facility criteria with the goal of returning to community.    D/C support will need to be verified: Spouse    Physiatry consultation pended per protocol.  W/U & TX pending.     Thank you for the referral.

## 2024-04-26 NOTE — ED NOTES
[facility]. Pt transported on full vitals monitoring with ICU RN. Chart, all belongings, and  accompanying patient to RICU. Pt care transferred.

## 2024-04-26 NOTE — DISCHARGE PLANNING
MRI negative for an acute event. Mobility and self care scores 24/24 No physiatry consult ordered per protocol.

## 2024-04-26 NOTE — THERAPY
"Speech Language Pathology   Clinical Swallow Evaluation     Patient Name: Belgica Irizarry  AGE:  33 y.o., SEX:  female  Medical Record #: [id number]  Date of Service: 4/26/2024      History of Present Illness  Pt is a 33 y.o. F who presented on 4/26 w/  headhead and left face, arm weakness and sensory changes. Per Dr. Weber, \"CTH, CTA, CTP imaging demonstrates no hemorrhage, vessel occlusion, or abnormal perfusion changes.\" Pt now s/p TNK.     CMHx: Acute embolic stroke  PMHx: Migraine, stomach ulcers, anxiety, GERD w/ esophagitis, cervical stenosis of spinal cavity, reactive depression    Dx Chest 4/26:   No acute cardiac or pulmonary abnormalities are identified.     Barium Swallow 8/19/21:   1.  No evidence of esophageal stricture or mass.  2.  No evidence of hiatal hernia.  3.  Mild intermittent lower esophageal sphincter spasm which did mildly delay transit of a 13 mm barium tablet.    MRI brain pending     General Information:  Vitals  O2 (LPM): 0  O2 Delivery Device: None - Room Air  Level of Consciousness: Alert, Awake  Patient Behaviors:  (Pleasant ; Cooperative)  Orientation: Oriented x 4  Follows Directives: Yes    Prior Living Situation & Level of Function:  Prior Services: Home-Independent  Lives with - Patient's Self Care Capacity: Spouse  Comments: Lives w/ spouse in [place]. 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 Works full-time.  Communication: WFL  Swallowing: Reported hx of globus- seen by GI for barium swallow/prescribed PPI. No longer takes PPI- states reflux improved w/ improved anxiety management. See above for barium swallow results. Consumed a regular diet prior to admit.     Oral Mechanism Evaluation:  Dentition: Good, Natural dentition   Facial Symmetry: Central left facial droop (slight)  Facial Sensation: Equal (to light touch, stating numbness in L side)     Labial Observations: Left sided weakness (slight to retraction)   Lingual Observations: Midline  Motor Speech: " WFL       Laryngeal Function:  Secretion Management: Adequate  Voice Quality: WFL  Cough: Perceptually WNL     Subjective  Pt cleared by RN for CSE. Pt was received awake/alert w/ family at b/s. Eager for PO intake.    Assessment  Current Method of Nutrition: NPO until cleared by speech pathology  Positioning: [place]'s (60-90 degrees)  Bolus Administration: Patient  O2 (LPM): 0 O2 Delivery Device: None - Room Air  Factor(s) Affecting Performance: None     Swallowing Trials:  Swallowing Trials  Thin Liquid (TN0): WFL  Liquidised (LQ3): WFL  Regular (RG7): WFL    Comments:   Pt managed trials of TN0 (single, sequential), LQ3, and RG7 completed. Pt w/ adequate oral bolus acceptance/containment. Stripping WFL. Slight L sided lingual and buccal residue appreciated on trials of RG7- improved w/ liquid wash and use of L sided tongue sweep. Mastication was timely and coordinated. Single swallow completed per bolus. No overt s/s of aspiration appreciated. Vocal quality remained perceptually dry both pre and post intake. Pt denied globus and odynophagia. Pt self fed with appropriate rate and volume.    SLP reviewed general s/s of aspiration and aspiration precautions. Additionally, reviewed compensatory strategies to A w/ oral phase deficits. All questions addressed.     Clinical Impressions  Pt presents with mild oral phase deficits likely acute given CVA. Pharyngeal phase appears intact and no overt s/s of aspiration were appreciated. Diet modification does not appear warranted at this time. Would recommend initiation of a regular solid/thin liquid diet with use of the compensatory strategies listed below. Should s/s of aspiration be appreciated on intake, or decline in respiratory status, please hold PO and contact SLP. Risk of aspiration or related sequelae can be mitigated with frequent, thorough, oral care and mobility as medically appropriate. Cognitive linguistic evaluation to be completed in the coming days.   "    Recommendations  Diet Consistency: regular solids/thin liquids  Instrumentation: None indicated at this time  Medication: Whole with liquid, As tolerated  Supervision: Distant supervision - check on patient 2-3 times per meal  Positioning: Fully upright and midline during oral intake, Meals sitting upright in a chair, as tolerated  Risk Management : Small bites/sips, Slow rate of intake, Alternate bites and sips, Physical mobility, as tolerated (tongue sweep to clear L side)  Oral Care: BID     SLP Treatment Plan  Treatment Plan: Dysphagia Treatment, Patient/Family/Caregiver Training (pending cog/ling eval)  SLP Frequency: 3x Per Week  Estimated Duration: Until Therapy Goals Met    Anticipated Discharge Needs  Discharge Recommendations:  (TBD pending clinical progress and cog/ling eval- suspect OP)   Therapy Recommendations Upon DC: Patient / Family / Caregiver Education      Patient / Family Goals  Patient / Family Goal #1: \"I am starving.\"  Short Term Goals  Short Term Goal # 1: Pt will consume a regular solid/thin liquid diet without any overt s/s of aspiration or decline in respiratory status.    Aida Brennan, SLP   "

## 2024-04-27 ENCOUNTER — APPOINTMENT (OUTPATIENT)
Dept: RADIOLOGY | Facility: MEDICAL CENTER | Age: 34
End: 2024-04-27
Attending: INTERNAL MEDICINE
Payer: COMMERCIAL

## 2024-04-27 ENCOUNTER — APPOINTMENT (OUTPATIENT)
Dept: CARDIOLOGY | Facility: MEDICAL CENTER | Age: 34
End: 2024-04-27
Attending: INTERNAL MEDICINE
Payer: COMMERCIAL

## 2024-04-27 VITALS
RESPIRATION RATE: 18 BRPM | SYSTOLIC BLOOD PRESSURE: 122 MMHG | OXYGEN SATURATION: 97 % | HEIGHT: 64 IN | DIASTOLIC BLOOD PRESSURE: 72 MMHG | HEART RATE: 68 BPM | BODY MASS INDEX: 23.75 KG/M2 | WEIGHT: 139.11 LBS | TEMPERATURE: 97.4 F

## 2024-04-27 PROBLEM — E87.8 ELECTROLYTE ABNORMALITY: Status: ACTIVE | Noted: 2024-04-27

## 2024-04-27 PROBLEM — R20.0 LEFT SIDED NUMBNESS: Status: ACTIVE | Noted: 2024-04-27

## 2024-04-27 PROBLEM — E78.5 DYSLIPIDEMIA: Status: ACTIVE | Noted: 2024-04-27

## 2024-04-27 PROBLEM — Z86.69 HISTORY OF MIGRAINE HEADACHES: Status: ACTIVE | Noted: 2024-04-27

## 2024-04-27 LAB
ANION GAP SERPL CALC-SCNC: 13 MMOL/L (ref 7–16)
BUN SERPL-MCNC: 18 MG/DL (ref 8–22)
CALCIUM SERPL-MCNC: 9.2 MG/DL (ref 8.5–10.5)
CHLORIDE SERPL-SCNC: 106 MMOL/L (ref 96–112)
CHOLEST SERPL-MCNC: 190 MG/DL (ref 100–199)
CO2 SERPL-SCNC: 18 MMOL/L (ref 20–33)
CREAT SERPL-MCNC: 0.7 MG/DL (ref 0.5–1.4)
ERYTHROCYTE [DISTWIDTH] IN BLOOD BY AUTOMATED COUNT: 42.2 FL (ref 35.9–50)
GFR SERPLBLD CREATININE-BSD FMLA CKD-EPI: 117 ML/MIN/1.73 M 2
GLUCOSE SERPL-MCNC: 90 MG/DL (ref 65–99)
HCT VFR BLD AUTO: 40.4 % (ref 37–47)
HDLC SERPL-MCNC: 43 MG/DL
HGB BLD-MCNC: 14 G/DL (ref 12–16)
LDLC SERPL CALC-MCNC: 120 MG/DL
LV EJECT FRACT  99904: 65
LV EJECT FRACT MOD 2C 99903: 52.35
LV EJECT FRACT MOD 4C 99902: 58.75
LV EJECT FRACT MOD BP 99901: 54.33
MAGNESIUM SERPL-MCNC: 1.8 MG/DL (ref 1.5–2.5)
MCH RBC QN AUTO: 31 PG (ref 27–33)
MCHC RBC AUTO-ENTMCNC: 34.7 G/DL (ref 32.2–35.5)
MCV RBC AUTO: 89.4 FL (ref 81.4–97.8)
PLATELET # BLD AUTO: 230 K/UL (ref 164–446)
PMV BLD AUTO: 11.1 FL (ref 9–12.9)
POTASSIUM SERPL-SCNC: 3.8 MMOL/L (ref 3.6–5.5)
RBC # BLD AUTO: 4.52 M/UL (ref 4.2–5.4)
SODIUM SERPL-SCNC: 137 MMOL/L (ref 135–145)
TRIGL SERPL-MCNC: 135 MG/DL (ref 0–149)
WBC # BLD AUTO: 8.5 K/UL (ref 4.8–10.8)

## 2024-04-27 PROCEDURE — 99239 HOSP IP/OBS DSCHRG MGMT >30: CPT | Performed by: HOSPITALIST

## 2024-04-27 PROCEDURE — 85027 COMPLETE CBC AUTOMATED: CPT

## 2024-04-27 PROCEDURE — 70450 CT HEAD/BRAIN W/O DYE: CPT

## 2024-04-27 PROCEDURE — 83735 ASSAY OF MAGNESIUM: CPT

## 2024-04-27 PROCEDURE — 80048 BASIC METABOLIC PNL TOTAL CA: CPT

## 2024-04-27 PROCEDURE — 99233 SBSQ HOSP IP/OBS HIGH 50: CPT | Performed by: INTERNAL MEDICINE

## 2024-04-27 PROCEDURE — 93306 TTE W/DOPPLER COMPLETE: CPT | Mod: 26 | Performed by: INTERNAL MEDICINE

## 2024-04-27 PROCEDURE — 80061 LIPID PANEL: CPT

## 2024-04-27 PROCEDURE — 99233 SBSQ HOSP IP/OBS HIGH 50: CPT | Performed by: STUDENT IN AN ORGANIZED HEALTH CARE EDUCATION/TRAINING PROGRAM

## 2024-04-27 PROCEDURE — 93306 TTE W/DOPPLER COMPLETE: CPT

## 2024-04-27 ASSESSMENT — ENCOUNTER SYMPTOMS
SENSORY CHANGE: 0
CHILLS: 0
BACK PAIN: 0
DEPRESSION: 0
COUGH: 0
BRUISES/BLEEDS EASILY: 0
ABDOMINAL PAIN: 0
NAUSEA: 0
VOMITING: 0
DIZZINESS: 0
SPEECH CHANGE: 0
HEADACHES: 0
BLURRED VISION: 0
NERVOUS/ANXIOUS: 0
FEVER: 0
SORE THROAT: 0
SHORTNESS OF BREATH: 0
SPUTUM PRODUCTION: 0
PALPITATIONS: 0
MYALGIAS: 0

## 2024-04-27 NOTE — HOSPITAL COURSE
"\"Belgica Irizarry is a 33 y.o. female who presented 4/26/2024 with hx of migraines.  Woke up at 6 am with headache thought it was her migraine took her medication, Ubrevy.  Headache now resolved  Developed left sided numbness on face and extremities 7: 15 am   Neurology examined and pt given on alteplase 8:37 am  + left arm drift, left sided facial droop and weakness on left side  Pt did have a viral illness a week ago with nasal congestion and recovered.     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 2/2024 - Dr. Tenorio); migraines since age 19, anxiety, insomnia, chronic headaches s/p occipital nerve injection in 12/2022, GERD\" - Dr. Zhang 4/26  "

## 2024-04-27 NOTE — PROGRESS NOTES
"Neurology Progress Note  Neurohospitalist Service, [facility]    Referring Physician: Jeremy M Gonda, M.D.      Interval History: Patient seen and examined this AM. Patient reports feeling well. Symptoms of weakness, numbness resolved yesterday around 1800. MRI negative for ischemia. Patient denies acute complaints. No reported acute overnight events.    Review of systems: In addition to what is detailed in the HPI and/or updated in the interval history, all other systems reviewed and are negative.    Past Medical History, Past Surgical History and Social History reviewed and unchanged from prior    Medications:    Current Facility-Administered Medications:     ondansetron (Zofran) syringe/vial injection 4 mg, 4 mg, Intravenous, Q4HRS PRN, Clarence Jones M.D.    ondansetron (Zofran ODT) dispertab 4 mg, 4 mg, Oral, Q4HRS PRN, Clarence Jones M.D.    promethazine (Phenergan) tablet 12.5-25 mg, 12.5-25 mg, Oral, Q4HRS PRN, Clarence Jones M.D.    promethazine (Phenergan) suppository 12.5-25 mg, 12.5-25 mg, Rectal, Q4HRS PRN, Clarence Jones M.D.    prochlorperazine (Compazine) injection 5-10 mg, 5-10 mg, Intravenous, Q4HRS PRN, Clarence Jones M.D.    acetaminophen (Tylenol) tablet 650 mg, 650 mg, Oral, Q4HRS PRN, Yudelka Garcia    Physical Examination:   /74   Pulse 68   Temp 36.3 °C (97.4 °F) (Temporal)   Resp 18   Ht 1.626 m (5' 4\")   Wt 63.1 kg (139 lb 1.8 oz)   SpO2 96%   BMI 23.88 kg/m²     NEUROLOGICAL EXAM:     MENTAL STATUS: Awake, alert and oriented to person, place, time   LANG/SPEECH: Naming and repetition intact, fluent speech, follows simple commands.     CRANIAL NERVES:  II: Pupils equal and reactive, no VF deficits  III, IV, VI: EOM intact, no gaze preference or deviation, no nystagmus.  V: Mild sensory changes in Lt compared to Rt, 80% compared to normal Rt  VII: Mild left facial asymmetry (less prominent than yesterday), no obvious " facial weakness with smile  VIII: normal hearing to speech  IX, X: normal palatal elevation, no uvular deviation  XI: 5/5 head turn and 5/5 shoulder shrug bilaterally  XII: midline tongue protrusion     MOTOR: Full intact strength in BL UE/LE     REFLEXES:bilateral flexor plantar response, no clonus  SENSORY: Intact to soft touch bilaterally, no DSS  COORD: Normal finger to nose, no tremor, no dysmetria  GAIT: Deferred    Objective Data:    Labs:  Lab Results   Component Value Date/Time    PROTHROMBTM 13.3 04/26/2024 07:58 AM    INR 1.00 04/26/2024 07:58 AM      Lab Results   Component Value Date/Time    WBC 8.5 04/27/2024 03:40 AM    RBC 4.52 04/27/2024 03:40 AM    HEMOGLOBIN 14.0 04/27/2024 03:40 AM    HEMATOCRIT 40.4 04/27/2024 03:40 AM    MCV 89.4 04/27/2024 03:40 AM    MCH 31.0 04/27/2024 03:40 AM    MCHC 34.7 04/27/2024 03:40 AM    MPV 11.1 04/27/2024 03:40 AM    NEUTSPOLYS 55.60 04/26/2024 07:58 AM    LYMPHOCYTES 31.20 04/26/2024 07:58 AM    MONOCYTES 9.30 04/26/2024 07:58 AM    EOSINOPHILS 3.10 04/26/2024 07:58 AM    BASOPHILS 0.70 04/26/2024 07:58 AM      Lab Results   Component Value Date/Time    SODIUM 137 04/27/2024 03:40 AM    POTASSIUM 3.8 04/27/2024 03:40 AM    CHLORIDE 106 04/27/2024 03:40 AM    CO2 18 (L) 04/27/2024 03:40 AM    GLUCOSE 90 04/27/2024 03:40 AM    BUN 18 04/27/2024 03:40 AM    CREATININE 0.70 04/27/2024 03:40 AM      Lab Results   Component Value Date/Time    CHOLSTRLTOT 190 04/27/2024 03:40 AM     (H) 04/27/2024 03:40 AM    HDL 43 04/27/2024 03:40 AM    TRIGLYCERIDE 135 04/27/2024 03:40 AM       Lab Results   Component Value Date/Time    ALKPHOSPHAT 82 04/26/2024 07:58 AM    ASTSGOT 16 04/26/2024 07:58 AM    ALTSGPT 10 04/26/2024 07:58 AM    TBILIRUBIN 0.7 04/26/2024 07:58 AM        Imaging/Testing:    I interpreted and/or reviewed the patient's neuroimaging    CT-HEAD W/O   Final Result         1.  No acute intracranial abnormality.         MR-BRAIN-W/O   Final Result      1.   No acute abnormality.   2.  Unremarkable pre and postcontrast MR examination of the brain.      DX-CHEST-PORTABLE (1 VIEW)   Final Result      No acute cardiac or pulmonary abnormalities are identified.      CT-CTA NECK WITH & W/O-POST PROCESSING   Final Result      CT angiogram of the neck within normal limits.      CT-CTA HEAD WITH & W/O-POST PROCESS   Final Result      CT angiogram of the [place] within normal limits.      CT-CEREBRAL PERFUSION ANALYSIS   Final Result      1.  Cerebral blood flow less than 30% likely representing completed infarct = 0 mL.      2.  T Max more than 6 seconds likely representing combination of completed infarct and ischemia = 0 mL.      3.  Mismatched volume likely representing ischemic brain/penumbra = None      4.  Please note that the cerebral perfusion was performed on the limited brain tissue around the basal ganglia region. Infarct/ischemia outside the CT perfusion sections can be missed in this study.      CT-HEAD W/O   Final Result      There is no acute intracranial abnormality.         EC-ECHOCARDIOGRAM COMPLETE W/O CONT    (Results Pending)       Assessment and Plan:  33 y.o. F w/ PMHx of migraines w/ aura who presents to [facility] for evaluation of headhead and left face, arm weakness and sensory changes.  Has history of migraine with visual aura, but no history of sensory or motor symptoms. CTH, CTA, CTP imaging demonstrates no hemorrhage, vessel occlusion, or abnormal perfusion changes. NIH 3 for left face, and arm sensory motor changes. Discussed with patient risks and benefits of TNK, confirmed that she did believe symptoms would be disabling for here and proceeded with IV-TNK administration, dose given ~8:40. Symptoms were most consistent with either small vessel ischemia or migraine with sensory and motor aura. MRI without evidence of underyling ischemia and no evidence of prior microvascular ischemic disease. Totality of evidence is supportive of migraine with  sensory and motor aura at this time.      Problem list:  -- Migraine w/ aura  -- Lf-facial droop  -- Lt-hemibody sensory changes  -- Lt-UE drift     Recommendations:  -- Post 24 hours and TTE w/ bubble patient will be amenable for discharge  -- Repeat head CT only if there is clinical deterioration  -- SBP goal now normotension, 100-140  -- Long term BP goal <130/80  -- A1c: 5.2, LDL: 120  -- Discussed with patient lipid lowering therapy; she reports known elevated cholesterol since college. At this time, do not recommend continued therapy for secondary stroke risk factor prevention and defer to outpatient evaluation and management regarding elevated LDL; can continue atorvastatin while admitted  -- No antithrombotic therapy recommended at this time; etiology felt unlikely to be acute ischemic stroke although follow up TTE w/ bubble will need to be completed to rule out potential thrombotic source  -- TTE w/ bubble study  -- Does not require zio patch on discharge  -- Stroke bridge clinic follow up on discharge  -- SCDs for DVT ppx  -- PT/OT/SLP ok even within first 24 hours  -- Vascular neurology will follow peripherally and make recommendations after TTE complete; if no shunt, then her work up is complete and no further diagnostics recommended; ok to discharge from our perspective; will sign off; please call with questions or concerns    I have performed a physical exam and reviewed and updated ROS and Plan today (4/27/2024).     Vickey Narvaez III, MD  ABPN Board Certified in Neurology  Vascular Neurology, [facility]

## 2024-04-27 NOTE — PROGRESS NOTES
"Critical Care Progress Note    Date of admission  4/26/2024    Chief Complaint  33 y.o. female admitted 4/26/2024 with L sided numbness    Hospital Course  \"Belgica Irizarry is a 33 y.o. female who presented 4/26/2024 with hx of migraines.  Woke up at 6 am with headache thought it was her migraine took her medication, Ubrevy.  Headache now resolved  Developed left sided numbness on face and extremities 7: 15 am   Neurology examined and pt given on alteplase 8:37 am  + left arm drift, left sided facial droop and weakness on left side  Pt did have a viral illness a week ago with nasal congestion and recovered.     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 2/2024 - Dr. Tenorio); migraines since age 19, anxiety, insomnia, chronic headaches s/p occipital nerve injection in 12/2022, GERD\" - Dr. Zhang 4/26    Interval Problem Update  Reviewed last 24 hour events:   - repeat head CT neg and MRI brain WNL   - AAOx4, no deficits, no HA   - walking around ICU awaiting echo   - AF, nl CBC   - NSR, SBP    - jacky diet   - low K, Mag   - abn lipid panel    Review of Systems  Review of Systems   Constitutional:  Negative for chills, fever and malaise/fatigue.   HENT:  Negative for congestion and sore throat.    Eyes:  Negative for blurred vision.   Respiratory:  Negative for cough, sputum production and shortness of breath.    Cardiovascular:  Negative for chest pain, palpitations and leg swelling.   Gastrointestinal:  Negative for abdominal pain, nausea and vomiting.   Genitourinary:  Negative for dysuria.   Musculoskeletal:  Negative for back pain and myalgias.   Skin:  Negative for rash.   Neurological:  Negative for dizziness, sensory change, speech change and headaches.   Endo/Heme/Allergies:  Does not bruise/bleed easily.   Psychiatric/Behavioral:  Negative for " depression. The patient is not nervous/anxious.    All other systems reviewed and are negative.       Vital Signs for last 24 hours   Temp:  [36.1 °C (96.9 °F)-36.6 °C (97.9 °F)] 36.1 °C (97 °F)  Pulse:  [63-98] 75  Resp:  [9-67] 20  BP: ()/(52-91) 107/55  SpO2:  [92 %-99 %] 96 %    Respiratory Information for the last 24 hours   Room air    Physical Exam   Physical Exam  Vitals and nursing note reviewed.   Constitutional:       General: She is awake. She is not in acute distress.     Appearance: Normal appearance. She is well-developed.      Comments: Walking around the unit   HENT:      Head: Normocephalic and atraumatic.      Nose: Nose normal. No congestion.      Mouth/Throat:      Mouth: Mucous membranes are moist.      Pharynx: Oropharynx is clear.   Eyes:      General: No scleral icterus.     Conjunctiva/sclera: Conjunctivae normal.      Pupils: Pupils are equal, round, and reactive to light.   Neck:      Vascular: No JVD.      Trachea: No tracheal deviation.   Cardiovascular:      Rate and Rhythm: Normal rate and regular rhythm.      Pulses: Normal pulses.      Heart sounds: Normal heart sounds. No murmur heard.  Pulmonary:      Effort: Pulmonary effort is normal. No respiratory distress.      Breath sounds: Normal breath sounds. No wheezing or rales.   Abdominal:      General: Bowel sounds are normal. There is no distension.      Palpations: Abdomen is soft.      Tenderness: There is no abdominal tenderness. There is no guarding or rebound.   Musculoskeletal:         General: No tenderness.      Cervical back: Neck supple. No tenderness.      Right lower leg: No edema.      Left lower leg: No edema.   Skin:     General: Skin is warm and dry.      Capillary Refill: Capillary refill takes less than 2 seconds.      Findings: No rash.   Neurological:      General: No focal deficit present.      Mental Status: She is alert and oriented to person, place, and time.      Cranial Nerves: No cranial nerve  deficit.      Sensory: No sensory deficit.   Psychiatric:         Mood and Affect: Mood normal.         Behavior: Behavior normal. Behavior is cooperative.         Thought Content: Thought content normal.         Medications  Current Facility-Administered Medications   Medication Dose Route Frequency Provider Last Rate Last Admin    ondansetron (Zofran) syringe/vial injection 4 mg  4 mg Intravenous Q4HRS SYLVESTER Jones M.D.        ondansetron (Zofran ODT) dispertab 4 mg  4 mg Oral Q4HRS PRRAMA Jones M.D.        promethazine (Phenergan) tablet 12.5-25 mg  12.5-25 mg Oral Q4HRS SYLVESTER Jones M.D.        promethazine (Phenergan) suppository 12.5-25 mg  12.5-25 mg Rectal Q4HRS PRRAMA Jones M.D.        prochlorperazine (Compazine) injection 5-10 mg  5-10 mg Intravenous Q4HRS SYLVESTER Jones M.D.        acetaminophen (Tylenol) tablet 650 mg  650 mg Oral Q4HRS PRRAMA Garcia           Fluids    Intake/Output Summary (Last 24 hours) at 4/27/2024 0645  Last data filed at 4/27/2024 0600  Gross per 24 hour   Intake 1750 ml   Output 600 ml   Net 1150 ml       Laboratory          Recent Labs     04/26/24  0758 04/27/24  0340   SODIUM 140 137   POTASSIUM 3.6 3.8   CHLORIDE 106 106   CO2 22 18*   BUN 13 18   CREATININE 0.73 0.70   MAGNESIUM  --  1.8   CALCIUM 9.4 9.2     Recent Labs     04/26/24  0758 04/27/24  0340   ALTSGPT 10  --    ASTSGOT 16  --    ALKPHOSPHAT 82  --    TBILIRUBIN 0.7  --    GLUCOSE 91 90     Recent Labs     04/26/24  0758 04/27/24  0340   WBC 6.8 8.5   NEUTSPOLYS 55.60  --    LYMPHOCYTES 31.20  --    MONOCYTES 9.30  --    EOSINOPHILS 3.10  --    BASOPHILS 0.70  --    ASTSGOT 16  --    ALTSGPT 10  --    ALKPHOSPHAT 82  --    TBILIRUBIN 0.7  --      Recent Labs     04/26/24  0758 04/27/24  0340   RBC 5.06 4.52   HEMOGLOBIN 15.3 14.0   HEMATOCRIT 44.8 40.4   PLATELETCT 247 230   PROTHROMBTM 13.3  --    APTT 28.3  --    INR 1.00  --         Imaging  X-Ray:  I have personally reviewed the images and compared with prior images. and My impression is: No acute cardiopulmonary process  CT:    Reviewed  MRI:   Reviewed    Assessment/Plan  * Left sided numbness  Assessment & Plan  Presumed to be possible stroke vs atypical migraine s/p TNK 4/26 at 0837   Neurology consulted  Echocardiogram pending  No need for antiplatelets, discussed statin with patient  Normal blood pressure management  No indication for PT/OT/SLP    Dyslipidemia  Assessment & Plan  Discussed benefits/risks of statin    History of migraine headaches  Assessment & Plan  Resume outpatient Ubrelvy    Electrolyte abnormality  Assessment & Plan  Replete low potassium and magnesium         VTE:  Not Indicated  Ulcer: Not Indicated  Lines: None    I have performed a physical exam and reviewed and updated ROS and Plan today (4/27/2024). In review of yesterday's note (4/26/2024), there are no changes except as documented above.     Discussed patient condition and risk of morbidity and/or mortality with Hospitalist, RN, RT, Pharmacy, Charge nurse / hot rounds, Patient, and neurology. Critical care services will sign off at this time.  Please call with any questions or concerns.    Please note that this dictation was created using voice recognition software. I have made every reasonable attempt to correct obvious errors, but there may be errors of grammar and possibly content that I did not discover before finalizing the note.

## 2024-04-27 NOTE — ASSESSMENT & PLAN NOTE
Presumed to be possible stroke vs atypical migraine s/p TNK 4/26 at 0837   Neurology consulted  Echocardiogram pending  No need for antiplatelets, discussed statin with patient  Normal blood pressure management  No indication for PT/OT/SLP

## 2024-04-27 NOTE — CARE PLAN
The patient is Stable - Low risk of patient condition declining or worsening    Shift Goals  Clinical Goals: post TNK protocol, SBP < 180  Patient Goals: have meal, updates  Family Goals: visit with patient, updates    Progress made toward(s) clinical / shift goals:      Problem: Neuro Status  Goal: Neuro status will remain stable or improve  Outcome: Progressing     Problem: Hemodynamic Monitoring  Goal: Patient's hemodynamics, fluid balance and neurologic status will be stable or improve  Outcome: Progressing     Problem: Knowledge Deficit - Standard  Goal: Patient and family/care givers will demonstrate understanding of plan of care, disease process/condition, diagnostic tests and medications  Outcome: Progressing     Problem: Pain - Standard  Goal: Alleviation of pain or a reduction in pain to the patient’s comfort goal  Outcome: Progressing

## 2024-04-27 NOTE — DISCHARGE INSTRUCTIONS
Physician Discharge Instructions:  Discharge Diagnosis: Likely atypical migraine with resolved neurologic symptoms, no evidence of CVA, normal MRI, normal echocardiogram, no arrhythmia  Proceed to discharge/transfer  to home  Take Rx/Prescriptions as prescribed and reconciled per AVS  For OTC Medication consult with your Pharmacist first.  Diet: As tolerated  Activity: As tolerated  Follow-up with PCP within 3-10 days at home location, ask for f/u prescriptions by PCP  Follow-up with Specialty MD: Neurology follow-up to discuss further options for migraine treatment  For new, severe symptoms refer to the next Emergency Care or call your Physician.  Controlled Substance History was reviewed if new Rx was issued.    Fernando Conde MD

## 2024-04-27 NOTE — DISCHARGE SUMMARY
"Discharge Summary    CHIEF COMPLAINT ON ADMISSION  Chief Complaint   Patient presents with    Possible Stroke     Woke this am at 0600 with headache feeling similar to past migraines. At 0715 she noticed left facial droop, \"tingling\" left arm.        Reason for Admission  Migraine headache associated with left facial, left arm weakness and sensory changes    Admission Date  4/26/2024    CODE STATUS  Full Code    HPI & HOSPITAL COURSE  This is a 33 y.o. female here with a past med history of migraines with aura, usually once a month, presents with complaints of left facial, left arm weakness and sensory changes, the patient with her previous migraines had never had any sensorimotor symptoms, the patient was evaluated for an acute stroke, she had no evidence of large vessel occlusion or ST-T abnormalities, the patient therefore per neurology given TNK, MRI was following, showed no evidence of underlying ischemia evidence of prior microvessel ischemic disease, for completeness and echocardiogram with bubble study was performed also negative, normal, the patient at this time is found to have evidence of migraine with sensory and motor aura, seen by neurology and follow-up, the patient at this time able to discharge and recommendation to follow-up with her regular neurologist to discuss possibly alternative options for her migraine treatment and prevention.  The patient is completely asymptomatic and back to baseline  Options and current status and discharge plan discussed with the patient      Therefore, she is discharged in good and stable condition to home with close outpatient follow-up.    The patient recovered much more quickly than anticipated on admission.    Discharge Date  4/27/2024    FOLLOW UP ITEMS POST DISCHARGE  Follow-up with the patient's primary neurologist    DISCHARGE DIAGNOSES  Principal Problem:    Left sided numbness (POA: Unknown)  Active Problems:    Acute embolic stroke (HCC) (POA: Yes)    " Electrolyte abnormality (POA: Unknown)    History of migraine headaches (POA: Unknown)    Dyslipidemia (POA: Unknown)  Resolved Problems:    * No resolved hospital problems. *      FOLLOW UP  With neurology and primary care    MEDICATIONS ON DISCHARGE     Medication List        CONTINUE taking these medications        Instructions   ondansetron 4 MG Tbdp  Commonly known as: Zofran ODT   Take 1 Tablet by mouth every 6 hours as needed for Nausea/Vomiting for up to 20 doses.  Dose: 4 mg     PRENATAL VITAMIN PO   Take 1 Tablet by mouth every day.  Dose: 1 Tablet     Ubrogepant 50 MG Tabs   Take 1 Tablet by mouth as needed (at onset of migraine-may repeat dose after 2 hours. Maximum: 200 mg per 24 hours).  Dose: 1 Tablet              Allergies  Allergies   Allergen Reactions    Clindamycin Rash     Pt reports that she gets a full body rash    Pcn [Penicillins] Rash     Pt reports that she gets a full body rash       DIET  Orders Placed This Encounter   Procedures    Diet Order Diet: Regular     Standing Status:   Standing     Number of Occurrences:   1     Order Specific Question:   Diet:     Answer:   Regular [1]       ACTIVITY  As tolerated.  Weight bearing as tolerated    CONSULTATIONS  Critical care, neurology    PROCEDURES  Thrombolytics on 4/26  No other invasive procedures    LABORATORY  Lab Results   Component Value Date    SODIUM 137 04/27/2024    POTASSIUM 3.8 04/27/2024    CHLORIDE 106 04/27/2024    CO2 18 (L) 04/27/2024    GLUCOSE 90 04/27/2024    BUN 18 04/27/2024    CREATININE 0.70 04/27/2024        Lab Results   Component Value Date    WBC 8.5 04/27/2024    HEMOGLOBIN 14.0 04/27/2024    HEMATOCRIT 40.4 04/27/2024    PLATELETCT 230 04/27/2024        Total time of the discharge process to the extent of 45 minutes.

## 2024-05-07 ENCOUNTER — OFFICE VISIT (OUTPATIENT)
Dept: MEDICAL GROUP | Facility: MEDICAL CENTER | Age: 34
End: 2024-05-07
Payer: COMMERCIAL

## 2024-05-07 ENCOUNTER — HOSPITAL ENCOUNTER (OUTPATIENT)
Dept: LAB | Facility: MEDICAL CENTER | Age: 34
End: 2024-05-07
Attending: NURSE PRACTITIONER
Payer: COMMERCIAL

## 2024-05-07 VITALS
RESPIRATION RATE: 16 BRPM | HEIGHT: 64 IN | SYSTOLIC BLOOD PRESSURE: 118 MMHG | WEIGHT: 138 LBS | HEART RATE: 66 BPM | DIASTOLIC BLOOD PRESSURE: 72 MMHG | OXYGEN SATURATION: 97 % | TEMPERATURE: 97.6 F | BODY MASS INDEX: 23.56 KG/M2

## 2024-05-07 DIAGNOSIS — F43.29 STRESS AND ADJUSTMENT REACTION: ICD-10-CM

## 2024-05-07 DIAGNOSIS — Z09 HOSPITAL DISCHARGE FOLLOW-UP: ICD-10-CM

## 2024-05-07 DIAGNOSIS — G43.009 MIGRAINE WITHOUT AURA AND WITHOUT STATUS MIGRAINOSUS, NOT INTRACTABLE: ICD-10-CM

## 2024-05-07 DIAGNOSIS — E78.5 DYSLIPIDEMIA: ICD-10-CM

## 2024-05-07 DIAGNOSIS — N92.6 IRREGULAR MENSTRUATION: ICD-10-CM

## 2024-05-07 DIAGNOSIS — R20.0 LEFT SIDED NUMBNESS: ICD-10-CM

## 2024-05-07 DIAGNOSIS — N97.9 INFERTILITY, FEMALE: ICD-10-CM

## 2024-05-07 PROBLEM — Z86.69 HISTORY OF MIGRAINE HEADACHES: Status: RESOLVED | Noted: 2024-04-27 | Resolved: 2024-05-07

## 2024-05-07 PROBLEM — E87.8 ELECTROLYTE ABNORMALITY: Status: RESOLVED | Noted: 2024-04-27 | Resolved: 2024-05-07

## 2024-05-07 PROBLEM — I63.9 ACUTE EMBOLIC STROKE (HCC): Status: RESOLVED | Noted: 2024-04-26 | Resolved: 2024-05-07

## 2024-05-07 LAB
FOLATE SERPL-MCNC: 11.4 NG/ML
HCYS SERPL-SCNC: 10.45 UMOL/L
T3FREE SERPL-MCNC: 3.01 PG/ML (ref 2–4.4)
T4 FREE SERPL-MCNC: 1.24 NG/DL (ref 0.93–1.7)
THYROPEROXIDASE AB SERPL-ACNC: 11 IU/ML (ref 0–9)
TSH SERPL DL<=0.005 MIU/L-ACNC: 2.23 UIU/ML (ref 0.38–5.33)
VIT B12 SERPL-MCNC: 559 PG/ML (ref 211–911)

## 2024-05-07 PROCEDURE — 3074F SYST BP LT 130 MM HG: CPT | Performed by: NURSE PRACTITIONER

## 2024-05-07 PROCEDURE — 3078F DIAST BP <80 MM HG: CPT | Performed by: NURSE PRACTITIONER

## 2024-05-07 PROCEDURE — 99214 OFFICE O/P EST MOD 30 MIN: CPT | Performed by: NURSE PRACTITIONER

## 2024-05-07 ASSESSMENT — FIBROSIS 4 INDEX: FIB4 SCORE: 0.73

## 2024-05-07 NOTE — PROGRESS NOTES
Subjective:     HPI:     Belgica Irizarry is a 33 y.o. female presents to discuss:   Chief Complaint   Patient presents with    Hospital Follow-up     Patient presents today to follow-up from recent ER visit-on April 26, 2024.    Patient was experiencing left facial and left arm weakness and sensory changes.  She has a history of having migraines but has never had any sensorimotor symptoms in the past.  She was worked up for acute stroke however thankfully this was ruled out.  MRI did not show any evidence of any underlying ischemia.  Echocardiogram with bubble study was also performed which was negative.  She states at this time she still has a little bit of word finding and concentration issues.  She does mention that the symptoms are also exacerbated with increased anxiety.     She has not scheduled yet with neurology.  She needs referral.    She was started on venlafaxine 37.5 mg in September 2023 for anxiety, stress and adjustment reaction.  However she decided to wean off the medication as she felt as if her symptoms had improved. She states that she is going to start EMDR.    She is also going to be starting IVF soon.  Consideration will need to be taken in regards to any anxiety medications that she may restart.    ROS: : see above      Current Outpatient Medications:     ondansetron (ZOFRAN ODT) 4 MG TABLET DISPERSIBLE, Take 1 Tablet by mouth every 6 hours as needed for Nausea/Vomiting for up to 20 doses., Disp: 20 Tablet, Rfl: 0    Ubrogepant 50 MG Tab, Take 1 Tablet by mouth as needed (at onset of migraine-may repeat dose after 2 hours. Maximum: 200 mg per 24 hours)., Disp: 30 Tablet, Rfl: 0    Prenatal Vit-Fe Fumarate-FA (PRENATAL VITAMIN PO), Take 1 Tablet by mouth every day., Disp: , Rfl:     Allergies   Allergen Reactions    Clindamycin Rash     Pt reports that she gets a full body rash    Pcn [Penicillins] Rash     Pt reports that she gets a full body rash       Objective:     Vitals: /72  "  Pulse 66   Temp 36.4 °C (97.6 °F)   Resp 16   Ht 1.626 m (5' 4\")   Wt 62.6 kg (138 lb)   LMP 04/27/2024 (Exact Date)   SpO2 97%   BMI 23.69 kg/m²      General: Alert, pleasant, NAD  HEENT: Normocephalic.  Neck supple.   Respiratory: no distress, no audible wheezing, RR -WNL  Skin: Warm, dry, no rashes.  Extremities: No leg edema. No discoloration  Neurological: No tremors  Psych:  Affect/mood is normal, judgement is good, memory is intact, grooming is appropriate.    Assessment/Plan:      1. Hospital discharge follow-up  Have reviewed hospital encounter with patient.    2. Left sided numbness  Resolved.? Complex migraine    3. Migraine without aura and without status migrainosus, not intractable  Chronic. Recommend consultation with neurology-referral placed.  - TSH; Future  - THYROID PEROXIDASE  (TPO) AB; Future  - ANTITHYROGLOBULIN AB; Future  - FREE THYROXINE; Future  - T3 FREE; Future  - HOMOCYSTEINE; Future  - FOLATE; Future  - MTHFR DNA ANALYSIS; Future  - VITAMIN B12; Future  - METHYLMALONIC ACID, SERUM; Future    4. Dyslipidemia  Mildly elevated LDL during admission.   We have discussed updating lipid panel.  - TSH; Future  - THYROID PEROXIDASE  (TPO) AB; Future  - ANTITHYROGLOBULIN AB; Future  - Lipid Profile; Future    5. Infertility, female  6. Irregular menstruation  Ongoing.  Recommend updating the following blood work  - HOMOCYSTEINE; Future  - FOLATE; Future  - MTHFR DNA ANALYSIS; Future  - VITAMIN B12; Future  - METHYLMALONIC ACID, SERUM; Future    7. Stress and adjustment reaction  Consider restarting SSRI/SNRI however she is planning on doing IVF recommend sertraline as this is safe during pregnancy.  She will also discuss this with her IVF team.    No problem-specific Assessment & Plan notes found for this encounter.       Return if symptoms worsen or fail to improve.      Clair MCKINNEY"

## 2024-05-08 DIAGNOSIS — G43.109 COMPLICATED MIGRAINE: ICD-10-CM

## 2024-05-08 DIAGNOSIS — G43.009 MIGRAINE WITHOUT AURA AND WITHOUT STATUS MIGRAINOSUS, NOT INTRACTABLE: ICD-10-CM

## 2024-05-09 LAB — THYROGLOB AB SERPL-ACNC: <0.9 IU/ML (ref 0–4)

## 2024-05-11 LAB — METHYLMALONATE SERPL-SCNC: <0.1 UMOL/L (ref 0–0.4)

## 2024-05-12 LAB
MTHFR C.1298A>C GENO BLD/T: NORMAL
MTHFR C.677C>T GENO BLD/T: NEGATIVE
MTHFR GENE MUT ANL BLD/T: NORMAL

## 2024-05-13 DIAGNOSIS — R76.8 ANTI-TPO ANTIBODIES PRESENT: ICD-10-CM

## 2024-05-13 RX ORDER — LEVOTHYROXINE SODIUM 0.03 MG/1
25 TABLET ORAL
Qty: 90 TABLET | Refills: 1 | Status: SHIPPED | OUTPATIENT
Start: 2024-05-13

## 2024-06-25 NOTE — PROGRESS NOTES
[facility]  GENERAL NEUROLOGY  NEW PATIENT VISIT    Referral source: Clair CLANCY    CC: Migraine without aura and without status migrainosus, not intractable, complicated migraine    HISTORY OF ILLNESS:  Belgica Irizarry is a 33 y.o. woman with a history most notable for migraine. She is a former patient of Linsey CLANCY. She was last seen January 2021 and was treated with rizatriptan for her migraines. She was recently admitted for her migraines 4/26/24 and evaluated for stroke. She was given TNK and had multiple images that were normal. Today, Belgica provided the following history:    Migraine description:  Denies aura prior to migraine. Migraine will start around the left orbit will radiate across skull down bilateral neck and bilateral shoulders. Quality of migraine is described as sharp, pulsation, head in vice. Intensity of migraine without medication 10/10. Ubrelvy 0/10. Migraines last for 1 day to 3 days. Post migraine hangover of allodynia, fatigue about 1/2-1 day. Frequency of migraine once a month. Associated symptoms: light sensitivity, sound sensitivity, occasional smell sensitivity, temperature sensitivity, nausea, blurred vision, left side face numbness (one time), left sided arm numbness (one time), Left sided facial droop (one time), Dizziness. Triggers: stress, skipping meals, occasional sleep deprivation, hormones, strong perfume smell, cigarette smoke. Denies being to trigger a migraine with sudden position change, bending, position change, sneezing, or laughing. Can trigger a migraine with coughing and crying. Denies double vision. Migraines occur more frequently in the morning.    The following is a summary of headache symptoms, presented in my standard format:    Family History: sister  Age at onset (years): 18 years old  Location: see above  Radiation: see above  Frequency: see above  Duration: see above  Headache Days/Month: Average 2-5 days a month  Quality:  see above  Intensity: see above  Aura: see above  Photophobia/Phonophobia/Nausea/Vomiting: yes, yes, yes, no  Provoked by Physical Activity?: no  Triggers: see above  Associated Symptoms: see above  Autonomic Signs (such as ptosis, miosis, conjunctival injection, rhinorrhea, increased lacrimation): no  Head Trauma: concussion   Association with Menses: yes   ED Visits: yes  Hospitalizations: yes  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   Sleep (hours/night): 7-8  Caffeine Intake: 1 green tea or coffee once daily  Hydration: yes  Nutrition: does not skip meals  Exercise: yes- weight lifting can trigger migraines  Analgesic Overuse: Tylenol 500-1,000 mg once or twice a month    Current Medication Regimen:  - Ubrelvy 50 mg    Medications Tried: Response  Preventive:  - Topamax  - Wellbutrin    Rescue:  - Sumatriptan  - Fioriciet  - Rizatriptan    Medications Not Tried:  -     MEDICAL AND SURGICAL HISTORY:  Past Medical History:   Diagnosis Date    Acute kidney injury (HCC) 02/11/2020    Low GFR during ER visit, no additonal findings    Anxiety     Arrhythmia     Paroxysmal atrial tachycardia; Holter monitor placed in 10/2023    Asthma     Bulimia nervosa in full remission 05/27/2021    Dyslipidemia 04/27/2024    GERD (gastroesophageal reflux disease)     Left sided numbness 04/27/2024    Migraines     Reactive depression 02/27/2023     Past Surgical History:   Procedure Laterality Date    CERVICAL DISK AND FUSION ANTERIOR  12/20/2023    Procedure: C5-6 ANTERIOR PLACEMENT OF ARTIFICIAL DISC;  Surgeon: Charlie Burkett M.D.;  Location: SURGERY [facility];  Service: Neurosurgery    WA INJ CERV/THORAC,W/ IMAGING N/A 10/27/2021    Procedure: C7-T1 interlaminar epidural steroid injection;  Surgeon: Gilles Ordonez M.D.;  Location: [facility];  Service: Pain Management    OTHER      Egg retrieval     MEDICATIONS:  Current Outpatient Medications   Medication Sig    levothyroxine  "(SYNTHROID) 25 MCG Tab Take 1 Tablet by mouth every morning on an empty stomach.    ondansetron (ZOFRAN ODT) 4 MG TABLET DISPERSIBLE Take 1 Tablet by mouth every 6 hours as needed for Nausea/Vomiting for up to 20 doses.    Ubrogepant 50 MG Tab Take 1 Tablet by mouth as needed (at onset of migraine-may repeat dose after 2 hours. Maximum: 200 mg per 24 hours).    Prenatal Vit-Fe Fumarate-FA (PRENATAL VITAMIN PO) Take 1 Tablet by mouth every day.     SOCIAL HISTORY:  Social History     Tobacco Use    Smoking status: Never    Smokeless tobacco: Never   Substance Use Topics    Alcohol use: Yes     Comment: occasional     Social History     Social History Narrative    Not on file     FAMILY HISTORY:  Family History   Problem Relation Age of Onset    Hypertension Mother     Cancer Father         Bladder cancer    Gout Father     Hyperlipidemia Father     Psychiatric Illness Maternal Aunt         Bipolar    Arterial Aneurysm Paternal Uncle     Drug abuse Paternal Uncle     Accidental Death Paternal Uncle     Psychiatric Illness Maternal Grandfather         Bipolar    Lung Disease Paternal Grandmother     Arterial Aneurysm Paternal Grandmother     Cancer Paternal Grandfather         Melanoma    Heart Disease Paternal Grandfather         CABG4    Stroke Paternal Grandfather        Ambulatory Vitals  Encounter Vitals  Temperature: 36.7 °C (98 °F)  Temp src: Temporal  Blood Pressure: 92/60  Pulse: 73  Respiration: 16  Pulse Oximetry: 96 %  Weight: 63.5 kg (139 lb 15.9 oz)  Height: 162.6 cm (5' 4\")  BMI (Calculated): 24.03     REVIEW OF SYSTEMS:  A ROS was completed.  Pertinent positives and negatives were included in the HPI, above.  All other systems were reviewed and are negative.    PHYSICAL EXAM:  General/Medical:  Camron presents clean and well-dressed.  She does not appear in any acute distress at this time.  She has no malar rash.  She does report some neck tension associated to her migraine.  She does report " allodynia of the scalp.  She reports no jaw claudication or jaw pain.  She has no upper or lower extremity edema.  She has palpable radial pulses.  She has good capillary refill in the upper extremities.  Auscultation of her heart revealed S1 and S2 with no other abnormal sounds.  Vital signs are listed above and are within normal limits.    Neuro:  MENTAL STATUS: awake and alert; no deficits of speech or language; oriented to person, place, and time; affect was appropriate to situation    CRANIAL NERVES:    II: acuity: J1+/J1, fields: intact to confrontation, pupils: 3/3 to 2/2 without a relative afferent pupillary defect, discs: sharp    III/IV/VI: versions: intact without nystagmus    V: facial sensation: symmetric to light touch    VII: facial expression: symmetric    VIII: hearing: intact to finger rub    IX/X: palate: elevates symmetrically    XI: shoulder shrug: symmetric    XII: tongue: midline    MOTOR:  - bulk: normal throughout  - tone: normal throughout  Upper Extremity Strength  (R/L)    5/5   Elbow flexion 5/5   Elbow extension 5/5   Shoulder abduction 5/5     Lower Extremity Strength  (R/L)   Hip flexion 5/5   Knee extension 5/5   Knee flexion 5/5   Ankle plantarflexion 5/5   Ankle dorsiflexion 5/5     - pronator drift: absent  - abnormal movements: none    SENSATION:  - light touch: Intact  - vibration: Intact  - temperature: Intact  - pinprick: NT  - proprioception: NT  - Romberg: absent    COORDINATION:  - finger to nose: normal, no ataxia on exam  - finger tapping: rapid and accurate, bilaterally  - foot tapping: Rapid and accurate, bilaterally    REFLEXES:  Reflex Right Left   BR 2+ 2+   Biceps 2+ 2+   Triceps 2+ 2+   Patellae 2+ 2+   Achilles 2+ 2+   Toes NT NT     GAIT:  - narrow base and normal, with normal arm swing  - heel-walk: Intact  - toe-walk: Intact  - tandem gait: intact    REVIEW OF IMAGING STUDIES: I reviewed the following studies:  MRI Brain:  Date: 4/26/24  W/o and w/  "contrast?: without  Indication: \"stroke\"  Comparison: none  Impression:  1.  No acute abnormality.  2.  Unremarkable pre and postcontrast MR examination of the brain.    REVIEW OF LABORATORY STUDIES:  4/27/24 BMP WNL except CO2 18  4/27/24 Magnesium WNL  4/27/24 Lipid WNL except   5/7/24 Vitamin B12 WNL  5/7/24 TSH WNL  ASSESSMENT& PLAN:  1. Migraine with aura and without status migrainosus, not intractable  Camron presents to establish with neurology provider for ongoing treatment and management of her migraines.  She is a former patient of Linsey López and was started on rizatriptan which was ineffective at aborting her migraine.  She presented to her primary provider who started her on Ubrelvy which was very effective at aborting her migraine.  She had an episode in April 2024 where she have left-sided facial droop she does report extreme stress in the days leading up to that migraine.  She presented to the ER who evaluated her for stroke gave her TNK and imaged her which was all negative.  Her description of her migraines happening around her menstrual cycle and her auras sound as a mixture of menstrual migraine with brainstem auras.  She does report being in the process of trying to get pregnant with IVF which will change her treatment plan.  She reports an average of 2 to 5 days a month of migraine days in which Ubrelvy is very effective at stopping.  Her neurological exam revealed no concerning findings.  Her last MRI was after TNK in April 2024 which was no acute abnormality and unremarkable examination of the brain.  There is no need to repeat imaging at this time.  We discussed that Ubrelvy has not been studied in pregnant women.  We did discuss that this triptans would be the safer rescue medication in the event that she does become pregnant.  If her frequency of migraines increases during pregnancy we can always try verapamil or propranolol.  We also discussed the neuromodulator unit and " nerivio for rescue and migraine prevention that is safe to use during pregnancy.  For now she can continue Ubrelvy at onset of migraine.  She does endorse nausea associated with her migraines but has Zofran dissolvable at home.  We discussed lifestyle changes such as: Adequate sleep, staying hydrated, stress reduction, not skipping meals, not overusing over-the-counter analgesics.  We also discussed over-the-counter supplements for migraine prevention listed below.  Camron can contact me via [facility] with any updates, concerns, or questions.  Otherwise I will see her back in 6 months to discuss her current medication regimen and its effect on her migraines.  - Ubrogepant (UBRELVY) 100 MG Tab; Take 1 Tablet by mouth as needed (1 tab at headache osnet, repeat in 2 hour prn).  Dispense: 10 Tablet; Refill: 11  - Ubrogepant (UBRELVY) 100 MG Tab; Take 1 Tablet by mouth as needed (1 tab at headache onset, repeat in 2 hours as needed).  Dispense: 4 Tablet; Refill: 0   Try supplementing:  - magnesium: 400-600 mg once or 200-300 mg twice daily  - riboflavin (vitamin B2): 400 mg once daily  - coenzyme Q10: 300 mg daily    BILLING DOCUMENTATION:   I spent 60 minutes in patient care. This includes time with chart review, pre-charting, records review, discussions with other healthcare providers, and documentation. This also includes face-to-face time with the patient for: exam review, discussion and treatment planning.     Mell Gamez MSN APRN-CNP  [facility] [place].

## 2024-06-26 ENCOUNTER — OFFICE VISIT (OUTPATIENT)
Dept: NEUROLOGY | Facility: MEDICAL CENTER | Age: 34
End: 2024-06-26
Payer: COMMERCIAL

## 2024-06-26 ENCOUNTER — PHARMACY VISIT (OUTPATIENT)
Dept: PHARMACY | Facility: MEDICAL CENTER | Age: 34
End: 2024-06-26
Payer: COMMERCIAL

## 2024-06-26 ENCOUNTER — TELEPHONE (OUTPATIENT)
Dept: NEUROLOGY | Facility: MEDICAL CENTER | Age: 34
End: 2024-06-26

## 2024-06-26 VITALS
SYSTOLIC BLOOD PRESSURE: 92 MMHG | WEIGHT: 139.99 LBS | HEART RATE: 73 BPM | TEMPERATURE: 98 F | BODY MASS INDEX: 23.9 KG/M2 | DIASTOLIC BLOOD PRESSURE: 60 MMHG | OXYGEN SATURATION: 96 % | HEIGHT: 64 IN | RESPIRATION RATE: 16 BRPM

## 2024-06-26 DIAGNOSIS — G43.109 MIGRAINE WITH AURA AND WITHOUT STATUS MIGRAINOSUS, NOT INTRACTABLE: ICD-10-CM

## 2024-06-26 PROCEDURE — RXMED WILLOW AMBULATORY MEDICATION CHARGE

## 2024-06-26 PROCEDURE — 99211 OFF/OP EST MAY X REQ PHY/QHP: CPT

## 2024-06-26 RX ORDER — MAGNESIUM GLUCONATE 27 MG(500)
500 TABLET ORAL
COMMUNITY

## 2024-06-26 RX ORDER — UBROGEPANT 100 MG/1
1 TABLET ORAL PRN
Qty: 4 TABLET | Refills: 0 | Status: SHIPPED | OUTPATIENT
Start: 2024-06-26

## 2024-06-26 RX ORDER — UBROGEPANT 100 MG/1
1 TABLET ORAL PRN
Qty: 10 TABLET | Refills: 11 | Status: SHIPPED | OUTPATIENT
Start: 2024-06-26

## 2024-06-26 ASSESSMENT — FIBROSIS 4 INDEX: FIB4 SCORE: 0.73

## 2024-06-26 NOTE — TELEPHONE ENCOUNTER
Prior Authorization for UBRELVY TABLET 100 MG (Quantity: 10, Days: 30) has been submitted via Cover My Meds: Key ([id number])    Insurance: [employer]    Will follow up in 24-48 business hours.

## 2024-06-26 NOTE — PATIENT INSTRUCTIONS
Nerivio: [email or web address]    Try supplementing:  - magnesium: 400 mg   - riboflavin (vitamin B2): 400 mg once daily  - coenzyme Q10: 300 mg daily     At onset of migraine take one ubrelvy, one zofran and two tylenol. If in 2 hours migraine is not 0/10 take another ubrelvy. If at home add benadryl.

## 2024-06-26 NOTE — TELEPHONE ENCOUNTER
Prior Authorization for UBRELVY TABLET 100 MG has been approved for a quantity of 10 , day supply 30    Prior Authorization reference number: [id number]  Insurance: [employer]  Effective dates: 06/26/2024 thru 06/26/2025  Copay: $855.91     Is patient eligible to fill with [facility]? Yes    Next Steps: The patient's copay exceeds $5.00. Proceed with contacting patient to offer financial assistance.

## 2024-07-01 PROCEDURE — RXMED WILLOW AMBULATORY MEDICATION CHARGE

## 2024-07-12 ENCOUNTER — PHARMACY VISIT (OUTPATIENT)
Dept: PHARMACY | Facility: MEDICAL CENTER | Age: 34
End: 2024-07-12
Payer: MEDICARE

## 2024-07-31 ENCOUNTER — HOSPITAL ENCOUNTER (OUTPATIENT)
Dept: LAB | Facility: MEDICAL CENTER | Age: 34
End: 2024-07-31
Attending: NURSE PRACTITIONER
Payer: COMMERCIAL

## 2024-07-31 DIAGNOSIS — E78.5 DYSLIPIDEMIA: ICD-10-CM

## 2024-07-31 DIAGNOSIS — R76.8 ANTI-TPO ANTIBODIES PRESENT: ICD-10-CM

## 2024-07-31 LAB
CHOLEST SERPL-MCNC: 204 MG/DL (ref 100–199)
FASTING STATUS PATIENT QL REPORTED: NORMAL
HDLC SERPL-MCNC: 50 MG/DL
LDLC SERPL CALC-MCNC: 136 MG/DL
T4 FREE SERPL-MCNC: 1.23 NG/DL (ref 0.93–1.7)
TRIGL SERPL-MCNC: 88 MG/DL (ref 0–149)
TSH SERPL-ACNC: 1.77 UIU/ML (ref 0.35–5.5)

## 2024-07-31 PROCEDURE — 84443 ASSAY THYROID STIM HORMONE: CPT

## 2024-07-31 PROCEDURE — 84439 ASSAY OF FREE THYROXINE: CPT

## 2024-07-31 PROCEDURE — 36415 COLL VENOUS BLD VENIPUNCTURE: CPT

## 2024-07-31 PROCEDURE — 80061 LIPID PANEL: CPT

## 2024-08-08 ENCOUNTER — PHARMACY VISIT (OUTPATIENT)
Dept: PHARMACY | Facility: MEDICAL CENTER | Age: 34
End: 2024-08-08
Payer: MEDICARE

## 2024-08-08 PROCEDURE — RXMED WILLOW AMBULATORY MEDICATION CHARGE

## 2024-09-03 ENCOUNTER — HOSPITAL ENCOUNTER (OUTPATIENT)
Facility: MEDICAL CENTER | Age: 34
End: 2024-09-03
Attending: NURSE PRACTITIONER
Payer: COMMERCIAL

## 2024-09-03 ENCOUNTER — EH NON-PROVIDER (OUTPATIENT)
Dept: OCCUPATIONAL MEDICINE | Facility: CLINIC | Age: 34
End: 2024-09-03

## 2024-09-03 ENCOUNTER — EMPLOYEE HEALTH (OUTPATIENT)
Dept: OCCUPATIONAL MEDICINE | Facility: CLINIC | Age: 34
End: 2024-09-03

## 2024-09-03 DIAGNOSIS — Z02.89 VISIT FOR OCCUPATIONAL HEALTH EXAMINATION: Primary | ICD-10-CM

## 2024-09-03 DIAGNOSIS — Z02.89 VISIT FOR OCCUPATIONAL HEALTH EXAMINATION: ICD-10-CM

## 2024-09-03 DIAGNOSIS — Z02.1 PRE-EMPLOYMENT HEALTH SCREENING EXAMINATION: ICD-10-CM

## 2024-09-03 PROCEDURE — 80305 DRUG TEST PRSMV DIR OPT OBS: CPT | Performed by: NURSE PRACTITIONER

## 2024-09-03 PROCEDURE — 8915 PR COMPREHENSIVE PHYSICAL: Performed by: NURSE PRACTITIONER

## 2024-09-03 PROCEDURE — 86480 TB TEST CELL IMMUN MEASURE: CPT | Performed by: NURSE PRACTITIONER

## 2024-09-03 PROCEDURE — 94375 RESPIRATORY FLOW VOLUME LOOP: CPT | Performed by: NURSE PRACTITIONER

## 2024-09-03 NOTE — PROGRESS NOTES
Pre Employment Drug Screen Completed.     Hand Hygiene form completed and copy given to the patient.    Docs:   COVID, Hep B, Tdap, MMR, and VZV    Drawn:  TB     Color Blindness 14/14    Mask Fit required along with physical.

## 2024-09-05 LAB
GAMMA INTERFERON BACKGROUND BLD IA-ACNC: 0.03 IU/ML
M TB IFN-G BLD-IMP: NEGATIVE
M TB IFN-G CD4+ BCKGRND COR BLD-ACNC: 0 IU/ML
MITOGEN IGNF BCKGRD COR BLD-ACNC: >10 IU/ML
QFT TB2 - NIL TBQ2: -0.01 IU/ML

## 2024-09-11 PROCEDURE — RXMED WILLOW AMBULATORY MEDICATION CHARGE

## 2024-09-12 ENCOUNTER — PHARMACY VISIT (OUTPATIENT)
Dept: PHARMACY | Facility: MEDICAL CENTER | Age: 34
End: 2024-09-12
Payer: MEDICARE

## 2024-09-25 ENCOUNTER — APPOINTMENT (RX ONLY)
Dept: URBAN - METROPOLITAN AREA CLINIC 15 | Facility: CLINIC | Age: 34
Setting detail: DERMATOLOGY
End: 2024-09-25

## 2024-09-25 DIAGNOSIS — L82.1 OTHER SEBORRHEIC KERATOSIS: ICD-10-CM

## 2024-09-25 DIAGNOSIS — D22 MELANOCYTIC NEVI: ICD-10-CM

## 2024-09-25 DIAGNOSIS — L44.8 OTHER SPECIFIED PAPULOSQUAMOUS DISORDERS: ICD-10-CM

## 2024-09-25 DIAGNOSIS — L81.4 OTHER MELANIN HYPERPIGMENTATION: ICD-10-CM

## 2024-09-25 DIAGNOSIS — Z71.89 OTHER SPECIFIED COUNSELING: ICD-10-CM

## 2024-09-25 PROBLEM — D22.5 MELANOCYTIC NEVI OF TRUNK: Status: ACTIVE | Noted: 2024-09-25

## 2024-09-25 PROCEDURE — ? ADDITIONAL NOTES

## 2024-09-25 PROCEDURE — ? COUNSELING

## 2024-09-25 PROCEDURE — ? EDUCATIONAL RESOURCES PROVIDED

## 2024-09-25 PROCEDURE — 99203 OFFICE O/P NEW LOW 30 MIN: CPT

## 2024-09-25 ASSESSMENT — LOCATION DETAILED DESCRIPTION DERM
LOCATION DETAILED: RIGHT PERIAREOLAR BREAST 1-2:00 REGION
LOCATION DETAILED: RIGHT SUPERIOR MEDIAL UPPER BACK
LOCATION DETAILED: RIGHT CLAVICULAR NECK
LOCATION DETAILED: LEFT RIB CAGE
LOCATION DETAILED: RIGHT AREOLA
LOCATION DETAILED: LEFT AREOLA
LOCATION DETAILED: RIGHT POSTERIOR SHOULDER
LOCATION DETAILED: SUPERIOR THORACIC SPINE
LOCATION DETAILED: LEFT SUPERIOR UPPER BACK
LOCATION DETAILED: MIDDLE STERNUM
LOCATION DETAILED: LEFT INFERIOR UPPER BACK

## 2024-09-25 ASSESSMENT — LOCATION SIMPLE DESCRIPTION DERM
LOCATION SIMPLE: UPPER BACK
LOCATION SIMPLE: LEFT UPPER BACK
LOCATION SIMPLE: RIGHT BREAST
LOCATION SIMPLE: RIGHT UPPER BACK
LOCATION SIMPLE: LEFT BREAST
LOCATION SIMPLE: RIGHT ANTERIOR NECK
LOCATION SIMPLE: RIGHT SHOULDER
LOCATION SIMPLE: CHEST
LOCATION SIMPLE: ABDOMEN

## 2024-09-25 ASSESSMENT — LOCATION ZONE DERM
LOCATION ZONE: ARM
LOCATION ZONE: NECK
LOCATION ZONE: TRUNK

## 2024-09-25 NOTE — PROCEDURE: ADDITIONAL NOTES
Additional Notes: Pt given cosmetic price for sk removal.
Render Risk Assessment In Note?: no
Detail Level: Simple

## 2024-11-13 ENCOUNTER — TELEPHONE (OUTPATIENT)
Dept: PHARMACY | Facility: MEDICAL CENTER | Age: 34
End: 2024-11-13
Payer: COMMERCIAL

## 2024-11-13 NOTE — TELEPHONE ENCOUNTER
Received [employer] request via  [employer]   for Ubrogepant (UBRELVY) 100 MG Tab . (Quantity:10, Day Supply:30)     Insurance: [employer]  Member ID:  [id number]  BIN: [id number]  PCN: [facility]  Group: [id number]     Ran Test claim via [place] & medication Rejects stating prior authorization is required.

## 2024-11-13 NOTE — TELEPHONE ENCOUNTER
Prior Authorization for Ubrogepant (UBRELVY) 100 MG Tab  (Quantity: 10, Days: 30) has been submitted via Cover My Meds: Key ([id number])    Insurance: [employer]    Will follow up in 24-48 business hours.

## 2024-11-14 NOTE — TELEPHONE ENCOUNTER
Prior Authorization for     Ubrogepant (UBRELVY) 100 MG Tab     has been approved for a quantity of 10 , day supply 30    Prior Authorization reference number: [id number]  Insurance: [employer]  Effective dates: 11/13/24 to 11/13/25  Copay: $0     Is patient eligible to fill with [facility]? Yes    Next Steps: The Patients copay is less than $5.00. Will contact the patient to determine choice of pharmacy, if applicable.      Pt already fills at our pharmacy on [address] This was for a refill with a new ins.

## 2024-12-13 ENCOUNTER — EH NON-PROVIDER (OUTPATIENT)
Dept: OCCUPATIONAL MEDICINE | Facility: CLINIC | Age: 34
End: 2024-12-13

## 2024-12-13 DIAGNOSIS — Z02.89 ENCOUNTER FOR OCCUPATIONAL HEALTH EXAMINATION: Primary | ICD-10-CM

## 2024-12-13 PROCEDURE — 94375 RESPIRATORY FLOW VOLUME LOOP: CPT | Performed by: NURSE PRACTITIONER

## 2025-01-22 ENCOUNTER — HOSPITAL ENCOUNTER (OUTPATIENT)
Facility: MEDICAL CENTER | Age: 35
End: 2025-01-22
Payer: COMMERCIAL

## 2025-01-22 PROCEDURE — 87624 HPV HI-RISK TYP POOLED RSLT: CPT

## 2025-01-22 PROCEDURE — 88142 CYTOPATH C/V THIN LAYER: CPT

## 2025-01-28 LAB
HPV I/H RISK 1 DNA SPEC QL NAA+PROBE: NOT DETECTED
SPECIMEN SOURCE: NORMAL
THINPREP PAP, CYTOLOGY NL11781: NORMAL

## 2025-02-11 ENCOUNTER — APPOINTMENT (OUTPATIENT)
Dept: MEDICAL GROUP | Facility: MEDICAL CENTER | Age: 35
End: 2025-02-11
Payer: COMMERCIAL

## 2025-02-11 VITALS
BODY MASS INDEX: 24.07 KG/M2 | TEMPERATURE: 97.6 F | RESPIRATION RATE: 16 BRPM | OXYGEN SATURATION: 96 % | SYSTOLIC BLOOD PRESSURE: 110 MMHG | DIASTOLIC BLOOD PRESSURE: 70 MMHG | HEIGHT: 64 IN | WEIGHT: 141 LBS | HEART RATE: 62 BPM

## 2025-02-11 DIAGNOSIS — Z02.89 ENCOUNTER FOR PHYSICAL EXAMINATION OF PROSPECTIVE FOSTER PARENT: ICD-10-CM

## 2025-02-11 DIAGNOSIS — K21.00 GASTROESOPHAGEAL REFLUX DISEASE WITH ESOPHAGITIS WITHOUT HEMORRHAGE: ICD-10-CM

## 2025-02-11 DIAGNOSIS — Z01.84 IMMUNITY STATUS TESTING: ICD-10-CM

## 2025-02-11 DIAGNOSIS — R76.8 ANTI-TPO ANTIBODIES PRESENT: ICD-10-CM

## 2025-02-11 DIAGNOSIS — N97.9 INFERTILITY, FEMALE: ICD-10-CM

## 2025-02-11 DIAGNOSIS — G43.009 MIGRAINE WITHOUT AURA AND WITHOUT STATUS MIGRAINOSUS, NOT INTRACTABLE: ICD-10-CM

## 2025-02-11 DIAGNOSIS — E78.5 DYSLIPIDEMIA: ICD-10-CM

## 2025-02-11 PROCEDURE — 3074F SYST BP LT 130 MM HG: CPT | Performed by: NURSE PRACTITIONER

## 2025-02-11 PROCEDURE — 3078F DIAST BP <80 MM HG: CPT | Performed by: NURSE PRACTITIONER

## 2025-02-11 PROCEDURE — 99214 OFFICE O/P EST MOD 30 MIN: CPT | Performed by: NURSE PRACTITIONER

## 2025-02-11 ASSESSMENT — PATIENT HEALTH QUESTIONNAIRE - PHQ9
5. POOR APPETITE OR OVEREATING: NOT AT ALL
SUM OF ALL RESPONSES TO PHQ QUESTIONS 1-9: 0
7. TROUBLE CONCENTRATING ON THINGS, SUCH AS READING THE NEWSPAPER OR WATCHING TELEVISION: NOT AT ALL
9. THOUGHTS THAT YOU WOULD BE BETTER OFF DEAD, OR OF HURTING YOURSELF: NOT AT ALL
8. MOVING OR SPEAKING SO SLOWLY THAT OTHER PEOPLE COULD HAVE NOTICED. OR THE OPPOSITE, BEING SO FIGETY OR RESTLESS THAT YOU HAVE BEEN MOVING AROUND A LOT MORE THAN USUAL: NOT AT ALL
1. LITTLE INTEREST OR PLEASURE IN DOING THINGS: NOT AT ALL
2. FEELING DOWN, DEPRESSED, IRRITABLE, OR HOPELESS: NOT AT ALL
6. FEELING BAD ABOUT YOURSELF - OR THAT YOU ARE A FAILURE OR HAVE LET YOURSELF OR YOUR FAMILY DOWN: NOT AL ALL
3. TROUBLE FALLING OR STAYING ASLEEP OR SLEEPING TOO MUCH: NOT AT ALL
4. FEELING TIRED OR HAVING LITTLE ENERGY: NOT AT ALL
SUM OF ALL RESPONSES TO PHQ9 QUESTIONS 1 AND 2: 0

## 2025-02-11 ASSESSMENT — FIBROSIS 4 INDEX: FIB4 SCORE: 0.75

## 2025-02-11 NOTE — PROGRESS NOTES
Subjective:     Belgica Irizarry is a 34 y.o. female presents to discuss:     Chief Complaint   Patient presents with    Follow-Up     Verbal consent was acquired by the patient to use [employer] ambient listening note generation during this visit Yes   History of Present Illness    Patient presents today to complete a form as her and her  are planning to be a foster parents in the hopes of adoption.  She has a history of being unable to conceive naturally.      She does note that she stopped taking levothyroxine quite sometime ago.  Overall feeling well.  Would like to update the lab work.    She notes that that Ubrelvy has been quite a game changer in her migraines.  Followed by neurology.  She does avoid NSAIDs due to GI upset and history of ulcer.  Last EGD 2021.  She does note that she was out of her Ubrelvy and had to take some NSAIDs which she did note caused severe bilateral flank pain which resolved in about 1 hour.     Family history reviewed.  Previous labs reviewed.      ROS: : see above      Current Outpatient Medications:     Ubrogepant (UBRELVY) 100 MG Tab, Take 1 Tablet by mouth as needed (1 tab at headache onset, repeat in 2 hours as needed)., Disp: 4 Tablet, Rfl: 0    clomiPHENE (CLOMID) 50 MG tablet, take 1 Tablet by mouth daily on cycle day 3-7, Disp: 5 Tablet, Rfl: 0    magnesium gluconate (MAG-G) 500 MG tablet, Take 500 mg by mouth 1 time a day as needed., Disp: , Rfl:     Ubrogepant (UBRELVY) 100 MG Tab, Take 1 Tablet by mouth as needed (1 tab at headache onset, may repeat in 2 hour as needed)., Disp: 10 Tablet, Rfl: 11    levothyroxine (SYNTHROID) 25 MCG Tab, Take 1 Tablet by mouth every morning on an empty stomach., Disp: 90 Tablet, Rfl: 1    ondansetron (ZOFRAN ODT) 4 MG TABLET DISPERSIBLE, Take 1 Tablet by mouth every 6 hours as needed for Nausea/Vomiting for up to 20 doses., Disp: 20 Tablet, Rfl: 0    Prenatal Vit-Fe Fumarate-FA (PRENATAL VITAMIN PO), Take 1 Tablet by mouth  "every day., Disp: , Rfl:     Allergies   Allergen Reactions    Clindamycin Rash     Pt reports that she gets a full body rash    Pcn [Penicillins] Rash     Pt reports that she gets a full body rash       Objective:   Vitals: /70   Pulse 62   Temp 36.4 °C (97.6 °F) (Temporal)   Resp 16   Ht 1.626 m (5' 4\")   Wt 64 kg (141 lb)   LMP 01/19/2025   SpO2 96%   BMI 24.20 kg/m²    General: Alert, pleasant, NAD  HEENT: Normocephalic.  Neck supple.  No cervical lymphadenopathy.  Respiratory: no distress, no audible wheezing, RR -WNL  Heart: HRR> no murmur  Skin: Warm, dry, no rashes.  Small/transverse scar noted on left anterior neck  Extremities: No leg edema. No discoloration  Neurological: No tremors  Psych:  Affect/mood is normal, judgement is good, memory is intact, grooming is appropriate.  Assessment/Plan:      1. Encounter for physical examination of prospective   Form completed and provided back to patient.    2. Infertility, female  Longstanding problem.  History of IVF.  Planning to apply for adoptive parent.    3. Migraine without aura and without status migrainosus, not intractable  Chronic.  Controlled with Ubrelvy.  Followed by neurology.    4. Gastroesophageal reflux disease with esophagitis without hemorrhage  Chronic.  Controlled.  Avoids NSAIDs.  Last EGD 2021.  - CBC WITHOUT DIFFERENTIAL; Future    5. Dyslipidemia  Chronic.  Not on statins.  Update lipid panel.  Continue to modify diet.  - Comp Metabolic Panel; Future  - Lipid Profile; Future    6. Anti-TPO antibodies present  Currently not on any thyroid supplementation.  Overall feeling euthyroid.  Update thyroid labs.  - TSH; Future  - FREE THYROXINE; Future    7. Immunity status testing  - VARICELLA ZOSTER IGG AB; Future   Return for Annual Preventative Exam.    {I have placed the above orders and discussed them with an approved delegating provider. The MA is performing the below orders under the direction of  " Rivas MCKINNEY    Health maintenance:    General Recommendations:   Smoking: recommend complete avoidance of all tobacco products  Alcohol: recommend limiting consumption  Physical Activity: goal is 30 min of moderate activity 5 times a week  Weight Management and Nutrition: high vegetable, high protein diet like the Mediterranean diet, portion control, avoid excessive sugars, Low Glycemic Index foods, adequate hydration, sleep.

## 2025-02-26 PROCEDURE — RXMED WILLOW AMBULATORY MEDICATION CHARGE

## 2025-02-27 ENCOUNTER — PHARMACY VISIT (OUTPATIENT)
Dept: PHARMACY | Facility: MEDICAL CENTER | Age: 35
End: 2025-02-27
Payer: COMMERCIAL

## 2025-02-28 ENCOUNTER — TELEPHONE (OUTPATIENT)
Dept: NEUROLOGY | Facility: MEDICAL CENTER | Age: 35
End: 2025-02-28
Payer: COMMERCIAL

## 2025-02-28 ENCOUNTER — HOSPITAL ENCOUNTER (OUTPATIENT)
Dept: LAB | Facility: MEDICAL CENTER | Age: 35
End: 2025-02-28
Attending: NURSE PRACTITIONER
Payer: COMMERCIAL

## 2025-02-28 DIAGNOSIS — K21.00 GASTROESOPHAGEAL REFLUX DISEASE WITH ESOPHAGITIS WITHOUT HEMORRHAGE: ICD-10-CM

## 2025-02-28 DIAGNOSIS — Z01.84 IMMUNITY STATUS TESTING: ICD-10-CM

## 2025-02-28 DIAGNOSIS — E78.5 DYSLIPIDEMIA: ICD-10-CM

## 2025-02-28 DIAGNOSIS — R76.8 ANTI-TPO ANTIBODIES PRESENT: ICD-10-CM

## 2025-02-28 LAB
ALBUMIN SERPL BCP-MCNC: 4.4 G/DL (ref 3.2–4.9)
ALBUMIN/GLOB SERPL: 1.5 G/DL
ALP SERPL-CCNC: 76 U/L (ref 30–99)
ALT SERPL-CCNC: 13 U/L (ref 2–50)
ANION GAP SERPL CALC-SCNC: 10 MMOL/L (ref 7–16)
AST SERPL-CCNC: 27 U/L (ref 12–45)
BILIRUB SERPL-MCNC: 0.8 MG/DL (ref 0.1–1.5)
BUN SERPL-MCNC: 17 MG/DL (ref 8–22)
CALCIUM ALBUM COR SERPL-MCNC: 9.5 MG/DL (ref 8.5–10.5)
CALCIUM SERPL-MCNC: 9.8 MG/DL (ref 8.5–10.5)
CHLORIDE SERPL-SCNC: 105 MMOL/L (ref 96–112)
CHOLEST SERPL-MCNC: 209 MG/DL (ref 100–199)
CO2 SERPL-SCNC: 24 MMOL/L (ref 20–33)
CREAT SERPL-MCNC: 0.97 MG/DL (ref 0.5–1.4)
ERYTHROCYTE [DISTWIDTH] IN BLOOD BY AUTOMATED COUNT: 41.5 FL (ref 35.9–50)
FASTING STATUS PATIENT QL REPORTED: NORMAL
GFR SERPLBLD CREATININE-BSD FMLA CKD-EPI: 79 ML/MIN/1.73 M 2
GLOBULIN SER CALC-MCNC: 3 G/DL (ref 1.9–3.5)
GLUCOSE SERPL-MCNC: 78 MG/DL (ref 65–99)
HCT VFR BLD AUTO: 44.8 % (ref 37–47)
HDLC SERPL-MCNC: 49 MG/DL
HGB BLD-MCNC: 14.7 G/DL (ref 12–16)
LDLC SERPL CALC-MCNC: 148 MG/DL
MCH RBC QN AUTO: 29.9 PG (ref 27–33)
MCHC RBC AUTO-ENTMCNC: 32.8 G/DL (ref 32.2–35.5)
MCV RBC AUTO: 91.1 FL (ref 81.4–97.8)
PLATELET # BLD AUTO: 248 K/UL (ref 164–446)
PMV BLD AUTO: 11.2 FL (ref 9–12.9)
POTASSIUM SERPL-SCNC: 4.3 MMOL/L (ref 3.6–5.5)
PROT SERPL-MCNC: 7.4 G/DL (ref 6–8.2)
RBC # BLD AUTO: 4.92 M/UL (ref 4.2–5.4)
SODIUM SERPL-SCNC: 139 MMOL/L (ref 135–145)
T4 FREE SERPL-MCNC: 1.19 NG/DL (ref 0.93–1.7)
TRIGL SERPL-MCNC: 60 MG/DL (ref 0–149)
TSH SERPL-ACNC: 2.39 UIU/ML (ref 0.35–5.5)
WBC # BLD AUTO: 6 K/UL (ref 4.8–10.8)

## 2025-02-28 PROCEDURE — 80061 LIPID PANEL: CPT

## 2025-02-28 PROCEDURE — 86787 VARICELLA-ZOSTER ANTIBODY: CPT

## 2025-02-28 PROCEDURE — 80053 COMPREHEN METABOLIC PANEL: CPT

## 2025-02-28 PROCEDURE — 36415 COLL VENOUS BLD VENIPUNCTURE: CPT

## 2025-02-28 PROCEDURE — 85027 COMPLETE CBC AUTOMATED: CPT

## 2025-02-28 PROCEDURE — 84439 ASSAY OF FREE THYROXINE: CPT

## 2025-02-28 PROCEDURE — 84443 ASSAY THYROID STIM HORMONE: CPT

## 2025-03-01 NOTE — PROGRESS NOTES
[facility]  GENERAL NEUROLOGY  FOLLOW UP VISIT    HISTORY OF ILLNESS:  Belgica Irizarry is a 33 y.o. woman with a history most notable for migraine.  She is here for follow-up to discuss her current medication regimen of Ubrelvy and its effectiveness on her migraines. 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 Since then she is still having difficulty with word finding, concentration. This has been consistent  since her hemigplegic migraine in April 2024. She has noticed it more with school.     Below information was gathered at previous appointment  Migraine description:  Denies aura prior to migraine. Migraine will start around the left orbit will radiate across skull down bilateral neck and bilateral shoulders. Quality of migraine is described as sharp, pulsation, head in vice. Intensity of migraine without medication 10/10. Ubrelvy 0/10. Migraines last for 1 day to 3 days. Post migraine hangover of allodynia, fatigue about 1/2-1 day. Frequency of migraine once a month. Associated symptoms: light sensitivity, sound sensitivity, occasional smell sensitivity, temperature sensitivity, nausea, blurred vision, left side face numbness (one time), left sided arm numbness (one time), Left sided facial droop (one time), Dizziness. Triggers: stress, skipping meals, occasional sleep deprivation, hormones, strong perfume smell, cigarette smoke. Denies being to trigger a migraine with sudden position change, bending, position change, sneezing, or laughing. Can trigger a migraine with coughing and crying. Denies double vision. Migraines occur more frequently in the morning.    The following is a summary of headache symptoms, presented in my standard format:    Family History: sister  Age at onset (years): 18 years old  Location: see  above  Radiation: see above  Frequency: see above  Duration: see above  Headache Days/Month: Average 2-5 days a month  Quality: see above  Intensity: see above  Aura: see above  Photophobia/Phonophobia/Nausea/Vomiting: yes, yes, yes, no  Provoked by Physical Activity?: no  Triggers: see above  Associated Symptoms: see above  Autonomic Signs (such as ptosis, miosis, conjunctival injection, rhinorrhea, increased lacrimation): no  Head Trauma: concussion   Association with Menses: yes   ED Visits: yes  Hospitalizations: yes  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  1 day each occurrence  Sleep (hours/night): 7-8  Caffeine Intake: 1 green tea or coffee once daily  Hydration: yes  Nutrition: does not skip meals  Exercise: yes- weight lifting can trigger migraines  Analgesic Overuse: Tylenol 500-1,000 mg once or twice a month    Current Medication Regimen:  - Ubrelvy 50 mg    Medications Tried: Response  Preventive:  - Topamax  - Wellbutrin    Rescue:  - Sumatriptan  - Fioriciet  - Rizatriptan    Medications Not Tried:  -     MEDICAL AND SURGICAL HISTORY:  Past Medical History:   Diagnosis Date    Acute kidney injury (HCC) 02/11/2020    Low GFR during ER visit, no additonal findings    Anxiety     Arrhythmia     Paroxysmal atrial tachycardia; Holter monitor placed in 10/2023    Asthma     Bulimia nervosa in full remission 05/27/2021    Dyslipidemia 04/27/2024    GERD (gastroesophageal reflux disease)     Left sided numbness 04/27/2024    Migraines     Reactive depression 02/27/2023     Past Surgical History:   Procedure Laterality Date    CERVICAL DISK AND FUSION ANTERIOR  12/20/2023    Procedure: C5-6 ANTERIOR PLACEMENT OF ARTIFICIAL DISC;  Surgeon: Charlie Burkett M.D.;  Location: SURGERY [facility];  Service: Neurosurgery    CA INJ CERV/THORAC,W/ IMAGING N/A 10/27/2021    Procedure: C7-T1 interlaminar epidural steroid injection;  Surgeon: Gilles Ordonez M.D.;  Location: SURGERY [facility]NA[facility];  Service: Pain Management    OTHER      Egg retrieval     MEDICATIONS:  Current Outpatient Medications   Medication Sig    clomiPHENE (CLOMID) 50 MG tablet take 1 Tablet by mouth daily on cycle day 3-7    magnesium gluconate (MAG-G) 500 MG tablet Take 500 mg by mouth 1 time a day as needed.    Ubrogepant (UBRELVY) 100 MG Tab Take 1 Tablet by mouth as needed (1 tab at headache onset, may repeat in 2 hour as needed).    Ubrogepant (UBRELVY) 100 MG Tab Take 1 Tablet by mouth as needed (1 tab at headache onset, repeat in 2 hours as needed).    levothyroxine (SYNTHROID) 25 MCG Tab Take 1 Tablet by mouth every morning on an empty stomach.    ondansetron (ZOFRAN ODT) 4 MG TABLET DISPERSIBLE Take 1 Tablet by mouth every 6 hours as needed for Nausea/Vomiting for up to 20 doses.    Prenatal Vit-Fe Fumarate-FA (PRENATAL VITAMIN PO) Take 1 Tablet by mouth every day.     SOCIAL HISTORY:  Social History     Tobacco Use    Smoking status: Never    Smokeless tobacco: Never   Substance Use Topics    Alcohol use: Yes     Comment: occasional     Social History     Social History Narrative    Not on file     FAMILY HISTORY:  Family History   Problem Relation Age of Onset    Hypertension Mother     Cancer Father         Bladder cancer    Gout Father     Hyperlipidemia Father     Psychiatric Illness Maternal Aunt         Bipolar    Arterial Aneurysm Paternal Uncle     Drug abuse Paternal Uncle     Accidental Death Paternal Uncle     Psychiatric Illness Maternal Grandfather         Bipolar    Lung Disease Paternal Grandmother     Arterial Aneurysm Paternal Grandmother     Cancer Paternal Grandfather         Melanoma    Heart Disease Paternal Grandfather         CABG4    Stroke Paternal Grandfather      Ambulatory Vitals  Encounter Vitals  Blood Pressure: 116/74  Pulse: 65  Respiration: 14  Pulse Oximetry: 98 %  Weight: 63.7 kg (140 lb 6.9 oz)  Height: 162.6 cm (5' 4\") (Stated)  BMI (Calculated): 24.11     Yan Cognitive " Assessment (MOCA) Version Number: 8.1   VISUOSPACIAL / EXECUTIVE   Clock Drawing: 3/3  Spatial Drawin/1  Cube Drawin/1    NAMING  Naming: 3/3    MEMORY  Memory: First trial    ATTENTION  Digits: 2/2  Letters: 1  Subtraction: 2 or 3/5    LANGUAGE  Repeat Phrases: 2/2  Fluency: 0/1    ABSTRACTION  Abstraction: 12    DELAYED RECALL  Recall words: 5/5  Category Cue (if applicable):    Multiple Choice Cue (if applicable):     ORIENTATION  Orientation:     Add 1 point if less than or equal to 12 yr education level:     MOCA TOTAL SCORE:    Biomechanical/Visual Limitations (if applicable):        REVIEW OF SYSTEMS:  A ROS was completed.  Pertinent positives and negatives were included in the HPI, above.  All other systems were reviewed and are negative.    PHYSICAL EXAM:  General/Medical:  Camron presents clean and well-dressed.  She does not appear in any acute distress at this time.  She has no malar rash.  She does report some neck tension associated to her migraine.   She reports no jaw claudication or jaw pain.  She has no upper or lower extremity edema.  Vital signs are listed above and within normal limits.    Neuro:  MENTAL STATUS: awake and alert; no deficits of speech or language; oriented to person, place, and time; affect was appropriate to situation    CRANIAL NERVES:    II: acuity: JNT/JNT, fields: intact to confrontation, pupils: 3/3 to 2/2 without a relative afferent pupillary defect, discs:NT    III/IV/VI: versions: intact without nystagmus    V: facial sensation:NT    VII: facial expression: symmetric    VIII: hearing: intact to finger rub    IX/X: palate: elevates symmetrically    XI: shoulder shrug: symmetric    XII: tongue: midline    MOTOR:  - bulk: NT  - tone: NT  Upper Extremity Strength  (R/L)    NT   Elbow flexion NT   Elbow extension NT   Shoulder abduction NT     Lower Extremity Strength  (R/L)   Hip flexion NT   Knee extension NT   Knee flexion NT   Ankle  "plantarflexion NT   Ankle dorsiflexion NT     - pronator drift: NT  - abnormal movements: none    SENSATION:  - light touch:NT  - vibration:NT  - temperature: NT  - pinprick: NT  - proprioception: NT  - Romberg: NT    COORDINATION:  - finger to nose: NT  - finger tapping: NT  - foot tapping: NT  - foot inversion/eversion: NT  - clonus: NT    REFLEXES:  Reflex Right Left   BR NT NT   Biceps NT NT   Triceps NT NT   Patellae NT NT   Achilles NT NT   Toes NT NT     GAIT:  - narrow base and normal, with normal arm swing  - heel-walk:NT  - toe-walk: NT  - tandem gait:NT    REVIEW OF IMAGING STUDIES: I reviewed the following studies:  MRI Brain:  Date: 4/26/24  W/o and w/ contrast?: without  Indication: \"stroke\"  Comparison: none  Impression:  1.  No acute abnormality.  2.  Unremarkable pre and postcontrast MR examination of the brain.    REVIEW OF LABORATORY STUDIES:  4/27/24 BMP WNL except CO2 18  4/27/24 Magnesium WNL  4/27/24 Lipid WNL except   5/7/24 Vitamin B12 WNL  5/7/24 TSH WNL    ASSESSMENT& PLAN:  1. Migraine with aura and without status migrainosus, not intractable  Camron presents for follow-up to discuss her current medication regimen and its effect on her migraines.  Currently she is well-controlled with Ubrelvy for her migraines but is going to be doing some family-planning so she would like to switch to Nerivio.  I will send in the order for Nerivio.  She does have some residual features from her hemiplegic migraine last April including difficulty concentrating and memory issues.  I will address both below.  However we did discuss a short burst of steroids to hopefully reduce any of the side effects that she is having still from the hemiplegic migraine.  She is agreeable to trying steroids.  A Nerivio representative will be in contact with her about Nerivio.  She does report her migraines occurring around her menstrual cycle.  We did discuss that if she knows when her menstrual cycle is about to " hit she can take Ubrelvy in conjunction with Tylenol 1 day before her migraine is due and then again the next day when her migraines due.  Otherwise she will follow-up with me in August to discuss her memory and migraines.  - dexamethasone (DECADRON) 2 MG tablet; Take 4 Tablets by mouth every day for 1 day, THEN 3 Tablets every day for 1 day, THEN 2 Tablets every day for 1 day, THEN 1 Tablet every day for 1 day.  Dispense: 10 Tablet; Refill: 0    2. Memory change  Camron and her  presents with concerns from her ongoing concentration issues and word finding difficulty.  She reports that she has noticed these since her hemiplegic migraine in April 2024.  She notices it more now that she is in school.  She does have a history of anxiety and was on Effexor for treatment.  We did discuss that sometimes anxiety presents differently which could possibly cause concentration and word finding difficulty.  We also discussed that it could possibly also be untreated ADHD or ADD.  However we did do a [place] exam checking her cognitive function.  She is 27/30 which is still in the normal range.  We discussed repeat imaging of her brain and a possible referral for a neuropsychologist for a 4-hour cognitive evaluation with a possible referral to our memory provider.  We did discuss having adequate sleep to help promote good brain health.  We also discussed sleep training if sleep is affecting her ability to sleep.  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  For now she would like to try a short burst of steroids to see if that helps eliminate any residual symptoms of her hemiplegic migraine with the option to contact me via [facility] with any additional workup that she would like.      BILLING DOCUMENTATION:   I spent 43 minutes in patient care. This includes time with chart review, pre-charting, records review, discussions with other healthcare providers, and  documentation. This also includes face-to-face time with the patient for: exam review, discussion and treatment planning.      Mell Gamez MSN APRN-CNP  [facility] [place]

## 2025-03-02 LAB — VZV IGG SER IA-ACNC: 12.4 S/CO

## 2025-03-04 ENCOUNTER — OFFICE VISIT (OUTPATIENT)
Dept: NEUROLOGY | Facility: MEDICAL CENTER | Age: 35
End: 2025-03-04
Payer: COMMERCIAL

## 2025-03-04 VITALS
WEIGHT: 140.43 LBS | HEART RATE: 65 BPM | HEIGHT: 64 IN | SYSTOLIC BLOOD PRESSURE: 116 MMHG | RESPIRATION RATE: 14 BRPM | DIASTOLIC BLOOD PRESSURE: 74 MMHG | BODY MASS INDEX: 23.98 KG/M2 | OXYGEN SATURATION: 98 %

## 2025-03-04 DIAGNOSIS — R41.3 MEMORY CHANGE: ICD-10-CM

## 2025-03-04 DIAGNOSIS — G43.109 MIGRAINE WITH AURA AND WITHOUT STATUS MIGRAINOSUS, NOT INTRACTABLE: ICD-10-CM

## 2025-03-04 PROCEDURE — 99212 OFFICE O/P EST SF 10 MIN: CPT

## 2025-03-04 PROCEDURE — 3078F DIAST BP <80 MM HG: CPT

## 2025-03-04 PROCEDURE — 3074F SYST BP LT 130 MM HG: CPT

## 2025-03-04 PROCEDURE — 99215 OFFICE O/P EST HI 40 MIN: CPT

## 2025-03-04 RX ORDER — DEXAMETHASONE 2 MG/1
TABLET ORAL
Qty: 10 TABLET | Refills: 0 | Status: SHIPPED | OUTPATIENT
Start: 2025-03-04 | End: 2025-03-08

## 2025-03-04 ASSESSMENT — MONTREAL COGNITIVE ASSESSMENT (MOCA)
4. NAME EACH OF THE THREE ANIMALS SHOWN: 3/3
ORIENTATION SUBSCORE: 6/6
WHAT IS THE TOTAL SCORE (OUT OF 30): 27
WHAT IS THE VERSION OF MOCA ADMINISTERED: 8.1
3. DRAW A CLOCK: CONTOUR, NUMBERS, HANDS: 3/3
2. COPY DRAWING: 1/1
7. [VIGILENCE] TAP WHEN HEARING DESIGNATED LETTER: 1/1
8. SERIAL SUBTRACTION OF 7S: 2 OR 3/5
6. READ LIST OF DIGITS [FORWARD/BACKWARD]: 2/2
11. FOR EACH PAIR OF WORDS, WHAT CATEGORY DO THEY BELONG TO (OUT OF 2): 1/2
9. REPEAT EACH SENTENCE: 2/2
DELAYED RECALL SUBSCORE: 5/5
1. ALTERNATING TRAIL MAKING: 1/1
10. [FLUENCY] NAME WORDS STARTING WITH DESIGNATED LETTER: 0/1
5. MEMORY TRIALS: FIRST TRIAL

## 2025-03-04 ASSESSMENT — PATIENT HEALTH QUESTIONNAIRE - PHQ9: CLINICAL INTERPRETATION OF PHQ2 SCORE: 0

## 2025-03-04 ASSESSMENT — FIBROSIS 4 INDEX: FIB4 SCORE: 1.03

## 2025-03-11 ENCOUNTER — HOSPITAL ENCOUNTER (OUTPATIENT)
Facility: MEDICAL CENTER | Age: 35
End: 2025-03-11
Payer: COMMERCIAL

## 2025-03-11 LAB — PROGEST SERPL-MCNC: 18.6 NG/ML

## 2025-03-11 PROCEDURE — 84144 ASSAY OF PROGESTERONE: CPT

## 2025-03-11 PROCEDURE — 36415 COLL VENOUS BLD VENIPUNCTURE: CPT

## 2025-03-11 NOTE — ASSESSMENT & PLAN NOTE
9 years since purging.  Having some ongoing, worsening GERD not responding to OTC ppi.  Would like to see GI.     Pt is out of her Trintellix    Please check the status of her PA

## 2025-04-25 ENCOUNTER — NON-PROVIDER VISIT (OUTPATIENT)
Dept: URGENT CARE | Facility: PHYSICIAN GROUP | Age: 35
End: 2025-04-25

## 2025-04-25 DIAGNOSIS — Z11.1 PPD SCREENING TEST: ICD-10-CM

## 2025-04-25 PROCEDURE — 86580 TB INTRADERMAL TEST: CPT

## 2025-04-25 NOTE — PROGRESS NOTES
Belgica Irizarry is a 34 y.o. female here for a non-provider visit for PPD placement -- Step 1 of 1    Reason for PPD:  school requirement    1. TB evaluation questionnaire completed by patient? No      -  If any answers marked yes did you contact a provider prior to placing? Yes  2.  Patient notified to return to clinic for reading on: 04/27/25 @1009  3.  PPD Placement documentation completed on TB evaluation questionnaire? Yes  4.  Location of TB evaluation questionnaire filed: YES

## 2025-04-27 ENCOUNTER — NON-PROVIDER VISIT (OUTPATIENT)
Dept: URGENT CARE | Facility: PHYSICIAN GROUP | Age: 35
End: 2025-04-27

## 2025-04-27 ENCOUNTER — APPOINTMENT (OUTPATIENT)
Dept: URGENT CARE | Facility: PHYSICIAN GROUP | Age: 35
End: 2025-04-27
Payer: COMMERCIAL

## 2025-04-27 LAB — TB WHEAL 3D P 5 TU DIAM: NORMAL MM

## 2025-04-28 NOTE — PROGRESS NOTES
Belgica Irizarry is a 34 y.o. female here for a non-provider visit for PPD reading -- Step 1 of 1.      1.  Resulted in Epic under enter/edit results? Yes   2.  TB evaluation questionnaire scanned into chart and original given to patient?Yes      3. Was induration greater than 0 mm? No.      Routed to PCP? No

## 2025-05-01 PROCEDURE — RXMED WILLOW AMBULATORY MEDICATION CHARGE

## 2025-05-02 ENCOUNTER — PHARMACY VISIT (OUTPATIENT)
Dept: PHARMACY | Facility: MEDICAL CENTER | Age: 35
End: 2025-05-02
Payer: COMMERCIAL

## 2025-05-29 ENCOUNTER — PATIENT MESSAGE (OUTPATIENT)
Dept: PHARMACY | Facility: MEDICAL CENTER | Age: 35
End: 2025-05-29
Payer: COMMERCIAL

## 2025-06-06 ENCOUNTER — PHARMACY VISIT (OUTPATIENT)
Dept: PHARMACY | Facility: MEDICAL CENTER | Age: 35
End: 2025-06-06
Payer: COMMERCIAL

## 2025-06-06 ENCOUNTER — OFFICE VISIT (OUTPATIENT)
Dept: MEDICAL GROUP | Facility: MEDICAL CENTER | Age: 35
End: 2025-06-06
Payer: COMMERCIAL

## 2025-06-06 VITALS
HEIGHT: 64 IN | SYSTOLIC BLOOD PRESSURE: 110 MMHG | WEIGHT: 132.4 LBS | TEMPERATURE: 98.7 F | DIASTOLIC BLOOD PRESSURE: 58 MMHG | RESPIRATION RATE: 16 BRPM | HEART RATE: 74 BPM | BODY MASS INDEX: 22.61 KG/M2

## 2025-06-06 DIAGNOSIS — R05.2 SUBACUTE COUGH: Primary | ICD-10-CM

## 2025-06-06 DIAGNOSIS — J45.21 MILD INTERMITTENT REACTIVE AIRWAY DISEASE WITH ACUTE EXACERBATION: ICD-10-CM

## 2025-06-06 PROCEDURE — 99213 OFFICE O/P EST LOW 20 MIN: CPT | Performed by: NURSE PRACTITIONER

## 2025-06-06 PROCEDURE — RXMED WILLOW AMBULATORY MEDICATION CHARGE: Performed by: NURSE PRACTITIONER

## 2025-06-06 PROCEDURE — 3078F DIAST BP <80 MM HG: CPT | Performed by: NURSE PRACTITIONER

## 2025-06-06 PROCEDURE — 3074F SYST BP LT 130 MM HG: CPT | Performed by: NURSE PRACTITIONER

## 2025-06-06 RX ORDER — PREDNISONE 20 MG/1
40 TABLET ORAL DAILY
Qty: 10 TABLET | Refills: 0 | Status: SHIPPED | OUTPATIENT
Start: 2025-06-06 | End: 2025-06-11

## 2025-06-06 RX ORDER — ALBUTEROL SULFATE 90 UG/1
1-2 INHALANT RESPIRATORY (INHALATION) EVERY 4 HOURS PRN
Qty: 18 G | Refills: 3 | Status: SHIPPED | OUTPATIENT
Start: 2025-06-06

## 2025-06-06 ASSESSMENT — FIBROSIS 4 INDEX: FIB4 SCORE: 1.03

## 2025-06-06 NOTE — PROGRESS NOTES
"Subjective:     Belgica Irizarry is a 34 y.o. female presents to discuss:     Chief Complaint   Patient presents with    Cold Symptoms     Patient states she got sick 2 1/2 weeks ago and cough will not go away. Sometimes cough is productive but most times dry. Did take Claritin and cough droops but it didn't help.      Verbal consent was acquired by the patient to use mimoOn ambient listening note generation during this visit Yes     History of Present Illness  The patient presents for evaluation of a persistent cough.    She has been experiencing a dry, raspy cough for approximately 2.5 weeks. The cough has shown slight improvement but has plateaued over the past week. She reports mild sinus congestion and occasional expectoration of phlegm. The cough is exacerbated by cold stimuli. She reports no fevers, rashes, headaches, or ear pain in the last 48 hours. Additionally, she experienced a loss of voice, which she attributes to a recent social event involving singing and dancing. She has a history of asthma, and her inhaler is currently .    She experienced transient pain in her right lacrimal duct two days ago, which has since resolved. There was no associated discharge.    ROS: : see above    Current Medications[1]    Allergies[2]    Objective:   Vitals: /58   Pulse 74   Temp 37.1 °C (98.7 °F) (Temporal)   Resp 16   Ht 1.626 m (5' 4\")   Wt 60.1 kg (132 lb 6.4 oz)   LMP 05/15/2025   BMI 22.73 kg/m²    General: Alert, pleasant, NAD  HEENT: Normocephalic.  Neck supple.  Bilateral EAC free of debris, nonbulging TM.  Posterior oropharyngeal area without any significant edema, erythema or exudate.  Respiratory: no distress, no audible wheezing, RR -WNL, dry cough-bronchospasm, no crackles, no rhonchi  Skin: Warm, dry, no rashes.  Extremities: No leg edema. No discoloration  Neurological: No tremors  Psych:  Affect/mood is normal, judgement is good, memory is intact, grooming is " appropriate.    Assessment/Plan:      1. Subacute cough    2. Mild intermittent reactive airway disease with acute exacerbation    Other orders  - albuterol 108 (90 Base) MCG/ACT Aero Soln inhalation aerosol; Inhale 1-2 Puffs every four hours as needed for Shortness of Breath.  Dispense: 18 g; Refill: 3  - predniSONE (DELTASONE) 20 MG Tab; Take 2 Tablets by mouth every day for 5 days.  Dispense: 10 Tablet; Refill: 0     Assessment & Plan  Persistent cough  The patient has been experiencing a persistent cough for approximately 2.5 weeks, which has plateaued over the past week. There is no significant sinus congestion, fever, rash, headache, or ear pain. The cough is described as dry and raspy, with occasional phlegm.  Treatment plan: Given her history of asthma and the expiration of her inhaler, a short course of steroids is recommended. Antibiotics are not indicated at this time. A prescription for prednisone 40 mg to be taken in the morning for 5 days will be provided. Additionally, an inhaler will be prescribed to help manage symptoms during periods of irritation. She is advised to continue gargling with hot water and using warm compresses as needed.      Return if symptoms worsen or fail to improve.      Please note that this dictation was created using voice recognition software. I have made every reasonable attempt to correct obvious errors, but I expect that there are errors of grammar and possibly content that I did not discover before finalizing the note.     Clair MCKINNEY    Health maintenance:    General Recommendations:   Smoking: recommend complete avoidance of all tobacco products  Alcohol: recommend limiting consumption  Physical Activity: goal is 30 min of moderate activity 5 times a week  Weight Management and Nutrition: high vegetable, high protein diet like the Mediterranean diet, portion control, avoid excessive sugars, Low Glycemic Index foods, adequate hydration, sleep.           [1]   Current Outpatient Medications:     albuterol 108 (90 Base) MCG/ACT Aero Soln inhalation aerosol, Inhale 1-2 Puffs every four hours as needed for Shortness of Breath., Disp: 18 g, Rfl: 3    predniSONE (DELTASONE) 20 MG Tab, Take 2 Tablets by mouth every day for 5 days., Disp: 10 Tablet, Rfl: 0    clomiPHENE (CLOMID) 50 MG tablet, take 1 Tablet by mouth daily on cycle day 3-7, Disp: 5 Tablet, Rfl: 0    Ubrogepant (UBRELVY) 100 MG Tab, Take 1 Tablet by mouth as needed (1 tab at headache onset, may repeat in 2 hour as needed)., Disp: 10 Tablet, Rfl: 11    Prenatal Vit-Fe Fumarate-FA (PRENATAL VITAMIN PO), Take 1 Tablet by mouth every day., Disp: , Rfl:   [2]   Allergies  Allergen Reactions    Clindamycin Rash     Pt reports that she gets a full body rash    Pcn [Penicillins] Rash     Pt reports that she gets a full body rash

## 2025-06-11 ENCOUNTER — PHARMACY VISIT (OUTPATIENT)
Dept: PHARMACY | Facility: MEDICAL CENTER | Age: 35
End: 2025-06-11
Payer: COMMERCIAL

## 2025-06-11 DIAGNOSIS — F32.9 REACTIVE DEPRESSION: Primary | ICD-10-CM

## 2025-06-11 PROCEDURE — RXMED WILLOW AMBULATORY MEDICATION CHARGE: Performed by: NURSE PRACTITIONER

## 2025-06-11 RX ORDER — BUPROPION HYDROCHLORIDE 150 MG/1
150 TABLET ORAL EVERY MORNING
Qty: 90 TABLET | Refills: 3 | Status: SHIPPED | OUTPATIENT
Start: 2025-06-11

## 2025-06-23 ENCOUNTER — HOSPITAL ENCOUNTER (OUTPATIENT)
Facility: MEDICAL CENTER | Age: 35
End: 2025-06-23
Payer: COMMERCIAL

## 2025-06-23 LAB
APPEARANCE UR: CLEAR
BACTERIA #/AREA URNS HPF: ABNORMAL /HPF
BILIRUB UR QL STRIP.AUTO: NEGATIVE
CASTS URNS QL MICRO: ABNORMAL /LPF (ref 0–2)
COLOR UR: YELLOW
EPITHELIAL CELLS 1715: ABNORMAL /HPF (ref 0–5)
GLUCOSE UR STRIP.AUTO-MCNC: NEGATIVE MG/DL
KETONES UR STRIP.AUTO-MCNC: NEGATIVE MG/DL
LEUKOCYTE ESTERASE UR QL STRIP.AUTO: NEGATIVE
MICRO URNS: ABNORMAL
NITRITE UR QL STRIP.AUTO: NEGATIVE
PH UR STRIP.AUTO: 6.5 [PH] (ref 5–8)
PROT UR QL STRIP: NEGATIVE MG/DL
RBC # URNS HPF: ABNORMAL /HPF (ref 0–2)
RBC UR QL AUTO: ABNORMAL
SP GR UR STRIP.AUTO: 1.01
UROBILINOGEN UR STRIP.AUTO-MCNC: 0.2 EU/DL
WBC #/AREA URNS HPF: ABNORMAL /HPF

## 2025-06-23 PROCEDURE — 81001 URINALYSIS AUTO W/SCOPE: CPT

## 2025-06-30 DIAGNOSIS — G43.109 MIGRAINE WITH AURA AND WITHOUT STATUS MIGRAINOSUS, NOT INTRACTABLE: ICD-10-CM

## 2025-06-30 RX ORDER — UBROGEPANT 100 MG/1
1 TABLET ORAL PRN
Qty: 10 TABLET | Refills: 11 | OUTPATIENT
Start: 2025-06-30

## 2025-07-06 ENCOUNTER — PHARMACY VISIT (OUTPATIENT)
Dept: PHARMACY | Facility: MEDICAL CENTER | Age: 35
End: 2025-07-06
Payer: COMMERCIAL

## 2025-07-06 ENCOUNTER — HOSPITAL ENCOUNTER (EMERGENCY)
Facility: MEDICAL CENTER | Age: 35
End: 2025-07-06
Attending: EMERGENCY MEDICINE
Payer: COMMERCIAL

## 2025-07-06 ENCOUNTER — APPOINTMENT (OUTPATIENT)
Dept: URGENT CARE | Facility: PHYSICIAN GROUP | Age: 35
End: 2025-07-06
Payer: COMMERCIAL

## 2025-07-06 ENCOUNTER — APPOINTMENT (OUTPATIENT)
Dept: RADIOLOGY | Facility: MEDICAL CENTER | Age: 35
End: 2025-07-06
Attending: EMERGENCY MEDICINE
Payer: COMMERCIAL

## 2025-07-06 VITALS
HEIGHT: 64 IN | WEIGHT: 126.32 LBS | HEART RATE: 87 BPM | OXYGEN SATURATION: 97 % | RESPIRATION RATE: 16 BRPM | SYSTOLIC BLOOD PRESSURE: 116 MMHG | TEMPERATURE: 98.2 F | BODY MASS INDEX: 21.57 KG/M2 | DIASTOLIC BLOOD PRESSURE: 79 MMHG

## 2025-07-06 DIAGNOSIS — R10.9 RIGHT FLANK PAIN: Primary | ICD-10-CM

## 2025-07-06 DIAGNOSIS — N20.1 URETERAL STONE: ICD-10-CM

## 2025-07-06 LAB
ALBUMIN SERPL BCP-MCNC: 4.6 G/DL (ref 3.2–4.9)
ALBUMIN/GLOB SERPL: 1.8 G/DL
ALP SERPL-CCNC: 94 U/L (ref 30–99)
ALT SERPL-CCNC: 13 U/L (ref 2–50)
AMORPHOUS CRYSTALS 1764: PRESENT /HPF
ANION GAP SERPL CALC-SCNC: 12 MMOL/L (ref 7–16)
APPEARANCE UR: ABNORMAL
AST SERPL-CCNC: 25 U/L (ref 12–45)
BACTERIA #/AREA URNS HPF: ABNORMAL /HPF
BASOPHILS # BLD AUTO: 0.3 % (ref 0–1.8)
BASOPHILS # BLD: 0.04 K/UL (ref 0–0.12)
BILIRUB SERPL-MCNC: 0.7 MG/DL (ref 0.1–1.5)
BILIRUB UR QL STRIP.AUTO: ABNORMAL
BUN SERPL-MCNC: 18 MG/DL (ref 8–22)
CALCIUM ALBUM COR SERPL-MCNC: 9.3 MG/DL (ref 8.5–10.5)
CALCIUM SERPL-MCNC: 9.8 MG/DL (ref 8.5–10.5)
CASTS URNS QL MICRO: ABNORMAL /LPF (ref 0–2)
CHLORIDE SERPL-SCNC: 105 MMOL/L (ref 96–112)
CO2 SERPL-SCNC: 22 MMOL/L (ref 20–33)
COLOR UR: ABNORMAL
CREAT SERPL-MCNC: 1.04 MG/DL (ref 0.5–1.4)
EOSINOPHIL # BLD AUTO: 0.13 K/UL (ref 0–0.51)
EOSINOPHIL NFR BLD: 1.1 % (ref 0–6.9)
EPITHELIAL CELLS 1715: ABNORMAL /HPF (ref 0–5)
ERYTHROCYTE [DISTWIDTH] IN BLOOD BY AUTOMATED COUNT: 42.5 FL (ref 35.9–50)
GFR SERPLBLD CREATININE-BSD FMLA CKD-EPI: 72 ML/MIN/1.73 M 2
GLOBULIN SER CALC-MCNC: 2.6 G/DL (ref 1.9–3.5)
GLUCOSE SERPL-MCNC: 83 MG/DL (ref 65–99)
GLUCOSE UR STRIP.AUTO-MCNC: ABNORMAL MG/DL
HCG SERPL QL: NEGATIVE
HCT VFR BLD AUTO: 44.2 % (ref 37–47)
HGB BLD-MCNC: 15.1 G/DL (ref 12–16)
IMM GRANULOCYTES # BLD AUTO: 0.05 K/UL (ref 0–0.11)
IMM GRANULOCYTES NFR BLD AUTO: 0.4 % (ref 0–0.9)
KETONES UR STRIP.AUTO-MCNC: ABNORMAL MG/DL
LEUKOCYTE ESTERASE UR QL STRIP.AUTO: ABNORMAL
LIPASE SERPL-CCNC: 43 U/L (ref 11–82)
LYMPHOCYTES # BLD AUTO: 1.65 K/UL (ref 1–4.8)
LYMPHOCYTES NFR BLD: 13.6 % (ref 22–41)
MCH RBC QN AUTO: 31.7 PG (ref 27–33)
MCHC RBC AUTO-ENTMCNC: 34.2 G/DL (ref 32.2–35.5)
MCV RBC AUTO: 92.7 FL (ref 81.4–97.8)
MICRO URNS: ABNORMAL
MONOCYTES # BLD AUTO: 1.01 K/UL (ref 0–0.85)
MONOCYTES NFR BLD AUTO: 8.3 % (ref 0–13.4)
NEUTROPHILS # BLD AUTO: 9.24 K/UL (ref 1.82–7.42)
NEUTROPHILS NFR BLD: 76.3 % (ref 44–72)
NITRITE UR QL STRIP.AUTO: ABNORMAL
NRBC # BLD AUTO: 0 K/UL
NRBC BLD-RTO: 0 /100 WBC (ref 0–0.2)
PH UR STRIP.AUTO: ABNORMAL [PH] (ref 5–8)
PLATELET # BLD AUTO: 281 K/UL (ref 164–446)
PMV BLD AUTO: 10.7 FL (ref 9–12.9)
POTASSIUM SERPL-SCNC: 4.1 MMOL/L (ref 3.6–5.5)
PROT SERPL-MCNC: 7.2 G/DL (ref 6–8.2)
PROT UR QL STRIP: ABNORMAL MG/DL
RBC # BLD AUTO: 4.77 M/UL (ref 4.2–5.4)
RBC # URNS HPF: ABNORMAL /HPF (ref 0–2)
RBC UR QL AUTO: ABNORMAL
SODIUM SERPL-SCNC: 139 MMOL/L (ref 135–145)
SP GR UR STRIP.AUTO: ABNORMAL
UROBILINOGEN UR STRIP.AUTO-MCNC: ABNORMAL EU/DL
WBC # BLD AUTO: 12.1 K/UL (ref 4.8–10.8)
WBC #/AREA URNS HPF: ABNORMAL /HPF

## 2025-07-06 PROCEDURE — 85025 COMPLETE CBC W/AUTO DIFF WBC: CPT

## 2025-07-06 PROCEDURE — RXMED WILLOW AMBULATORY MEDICATION CHARGE: Performed by: EMERGENCY MEDICINE

## 2025-07-06 PROCEDURE — 36415 COLL VENOUS BLD VENIPUNCTURE: CPT | Mod: EDC

## 2025-07-06 PROCEDURE — 80053 COMPREHEN METABOLIC PANEL: CPT

## 2025-07-06 PROCEDURE — 84703 CHORIONIC GONADOTROPIN ASSAY: CPT

## 2025-07-06 PROCEDURE — 74176 CT ABD & PELVIS W/O CONTRAST: CPT

## 2025-07-06 PROCEDURE — 81001 URINALYSIS AUTO W/SCOPE: CPT

## 2025-07-06 PROCEDURE — 700105 HCHG RX REV CODE 258: Performed by: EMERGENCY MEDICINE

## 2025-07-06 PROCEDURE — 96374 THER/PROPH/DIAG INJ IV PUSH: CPT | Mod: EDC

## 2025-07-06 PROCEDURE — 700111 HCHG RX REV CODE 636 W/ 250 OVERRIDE (IP): Mod: JZ | Performed by: EMERGENCY MEDICINE

## 2025-07-06 PROCEDURE — 99285 EMERGENCY DEPT VISIT HI MDM: CPT | Mod: EDC

## 2025-07-06 PROCEDURE — 83690 ASSAY OF LIPASE: CPT

## 2025-07-06 RX ORDER — KETOROLAC TROMETHAMINE 15 MG/ML
15 INJECTION, SOLUTION INTRAMUSCULAR; INTRAVENOUS ONCE
Status: COMPLETED | OUTPATIENT
Start: 2025-07-06 | End: 2025-07-06

## 2025-07-06 RX ORDER — NAPROXEN 500 MG/1
500 TABLET ORAL 2 TIMES DAILY WITH MEALS
Qty: 20 TABLET | Refills: 0 | Status: SHIPPED | OUTPATIENT
Start: 2025-07-06 | End: 2025-07-16

## 2025-07-06 RX ORDER — SODIUM CHLORIDE, SODIUM LACTATE, POTASSIUM CHLORIDE, AND CALCIUM CHLORIDE .6; .31; .03; .02 G/100ML; G/100ML; G/100ML; G/100ML
1000 INJECTION, SOLUTION INTRAVENOUS ONCE
Status: COMPLETED | OUTPATIENT
Start: 2025-07-06 | End: 2025-07-06

## 2025-07-06 RX ORDER — TAMSULOSIN HYDROCHLORIDE 0.4 MG/1
0.4 CAPSULE ORAL DAILY
Qty: 30 CAPSULE | Refills: 0 | Status: SHIPPED | OUTPATIENT
Start: 2025-07-06 | End: 2025-08-05

## 2025-07-06 RX ADMIN — SODIUM CHLORIDE, POTASSIUM CHLORIDE, SODIUM LACTATE AND CALCIUM CHLORIDE 1000 ML: 600; 310; 30; 20 INJECTION, SOLUTION INTRAVENOUS at 12:45

## 2025-07-06 RX ADMIN — KETOROLAC TROMETHAMINE 15 MG: 15 INJECTION, SOLUTION INTRAMUSCULAR; INTRAVENOUS at 13:27

## 2025-07-06 ASSESSMENT — PAIN DESCRIPTION - PAIN TYPE
TYPE: ACUTE PAIN
TYPE: ACUTE PAIN

## 2025-07-06 ASSESSMENT — FIBROSIS 4 INDEX: FIB4 SCORE: 1.03

## 2025-07-06 NOTE — ED NOTES
"Belgica Irizarry has been discharged from the Emergency Room.    Discharge instructions, which include signs and symptoms to monitor patient for, as well as detailed information regarding right flank pain and ureteral stone provided.  All questions and concerns addressed at this time.      Prescription for flomax and naproxen provided to patient. Education provided on proper admin.     Follow up with PCP encouraged.     Patient leaves ER in no apparent distress. This RN provided education regarding returning to the ER for any new concerns or changes in patient's condition.      /79   Pulse 87   Temp 36.8 °C (98.2 °F) (Temporal)   Resp 16   Ht 1.626 m (5' 4\")   Wt 57.3 kg (126 lb 5.2 oz)   LMP 07/06/2025 (Approximate)   SpO2 97%   BMI 21.68 kg/m²     "

## 2025-07-06 NOTE — ED PROVIDER NOTES
ED Provider Note    CHIEF COMPLAINT  Chief Complaint   Patient presents with    RLQ Pain       EXTERNAL RECORDS REVIEWED  Patient notes are reviewed with the patient has a history of migraines, had outpatient CT and MRIs.  Patient with history of ovarian cyst with rupture and urinalysis have ordered.  She had hematuria at that time and a pelvic ultrasound had been ordered.    HPI/ROS  LIMITATION TO HISTORY   Select: : None  OUTSIDE HISTORIAN(S):  Significant other at bedside    Belgica Irizarry is a 34 y.o. female who presents to the emergency room for evaluation of some intermittent worsening right lower quadrant discomfort.  The patient states that she has a history of ovarian cyst and had undergone outpatient workup with this pelvic discomfort and had had a normal pelvic exam by her OB about a week ago and had had some hematuria at that time.  She was concerned about the intermittent pain intensity to her right flank, sometimes in the right lower quadrant rating to her back.  She has not had any associated fevers, no vaginal bleeding, she has no concerns about vaginal discharge or STD infections.  She does not believe that she is currently pregnant.  She says that her pain is somewhat subsiding and feels like it is migrated from the outside of the flank down towards the inner portion of the groin.  She has no pain with ambulation, no pain with torsional movements of her chest, she has no pain with deep inspiration.  She has a remote history of SVT for which she is currently undergoing outpatient workup for.    PAST MEDICAL HISTORY   has a past medical history of Acute kidney injury (HCC) (02/11/2020), Anxiety, Arrhythmia, Asthma, Bulimia nervosa in full remission (05/27/2021), Dyslipidemia (04/27/2024), GERD (gastroesophageal reflux disease), Left sided numbness (04/27/2024), Migraines, and Reactive depression (02/27/2023).    SURGICAL HISTORY   has a past surgical history that includes inj cerv/thorac,w/  "imaging (N/A, 10/27/2021); other; and cervical disk and fusion anterior (12/20/2023).    FAMILY HISTORY  Family History   Problem Relation Age of Onset    Hypertension Mother     Cancer Father         Bladder cancer    Gout Father     Hyperlipidemia Father     Psychiatric Illness Maternal Aunt         Bipolar    Arterial Aneurysm Paternal Uncle     Drug abuse Paternal Uncle     Accidental Death Paternal Uncle     Psychiatric Illness Maternal Grandfather         Bipolar    Lung Disease Paternal Grandmother     Arterial Aneurysm Paternal Grandmother     Cancer Paternal Grandfather         Melanoma    Heart Disease Paternal Grandfather         CABG4    Stroke Paternal Grandfather      SOCIAL HISTORY  Social History     Tobacco Use    Smoking status: Never    Smokeless tobacco: Never   Vaping Use    Vaping status: Never Used   Substance and Sexual Activity    Alcohol use: Yes     Comment: occasional    Drug use: No    Sexual activity: Yes     Partners: Male     Birth control/protection: None     CURRENT MEDICATIONS  Home Medications       Reviewed by Ama Key R.N. (Registered Nurse) on 07/06/25 at 1126  Med List Status: Partial     Medication Last Dose Status   albuterol 108 (90 Base) MCG/ACT Aero Soln inhalation aerosol  Active   buPROPion (WELLBUTRIN XL) 150 MG XL tablet 7/6/2025 Active   clomiPHENE (CLOMID) 50 MG tablet  Active   Prenatal Vit-Fe Fumarate-FA (PRENATAL VITAMIN PO)  Active   Ubrogepant (UBRELVY) 100 MG Tab  Active                  ALLERGIES  Allergies[1]    PHYSICAL EXAM  VITAL SIGNS: /79   Pulse 87   Temp 36.8 °C (98.2 °F) (Temporal)   Resp 16   Ht 1.626 m (5' 4\")   Wt 57.3 kg (126 lb 5.2 oz)   LMP 07/06/2025 (Approximate)   SpO2 97%   BMI 21.68 kg/m²    Genl: F sitting in gurney comfortably, speaking clearly, appears in no acute distress   Head: NC/AT   ENT: Mucous membranes moist, posterior pharynx clear, uvula midline, nares patent bilaterally   Eyes: Normal sclera, pupils " equal round reactive to light  Pulmonary: Lungs are clear to auscultation bilaterally  Chest: No TTP  CV:  RRR, no murmur appreciated, pulses 2+ in both upper and lower extremities  Abdomen: soft, nonspecific right lower quadrant, right flank discomfort with no rigidity or distention. ND; no rebound/guarding, no masses palpated, no HSM   : no CVA or suprapubic tenderness   Musculoskeletal: Pain free ROM of the neck. Moving upper and lower extremities in spontaneous and coordinated fashion  Neuro: A&Ox4 (person, place, time, situation), speech fluent, gait steady, no focal deficits appreciated  Skin: No rash or lesions.  No pallor or jaundice.  No cyanosis.  Warm and dry.     EKG/LABS  Labs Reviewed   CBC WITH DIFFERENTIAL - Abnormal; Notable for the following components:       Result Value    WBC 12.1 (*)     Neutrophils-Polys 76.30 (*)     Lymphocytes 13.60 (*)     Neutrophils (Absolute) 9.24 (*)     Monos (Absolute) 1.01 (*)     All other components within normal limits   URINALYSIS,CULTURE IF INDICATED - Abnormal; Notable for the following components:    Color Orange (*)     Character Hazy (*)     All other components within normal limits   URINE MICROSCOPIC (W/UA) - Abnormal; Notable for the following components:    RBC 11-20 (*)     Amorphous Crystal Present (*)     All other components within normal limits   COMP METABOLIC PANEL   LIPASE   HCG QUAL SERUM   ESTIMATED GFR     RADIOLOGY/PROCEDURES   I have independently interpreted the diagnostic imaging associated with this visit and am waiting the final reading from the radiologist.   My preliminary interpretation is as follows: Dense of a sub-4 mm calcification at the right UVJ with a left-sided renal stone but no signs of hydronephrosis bilaterally.  Radiologist interpretation:  CT-RENAL COLIC EVALUATION(A/P W/O)   Final Result      1.  There is a 4 mm calcification in the right hemipelvis which appears to be in the distal right ureter. There is no  significant hydronephrosis.   2.  Small left renal stone.        COURSE & MEDICAL DECISION MAKING    ASSESSMENT, COURSE AND PLAN  Care Narrative: Patient presents emergency room for symptoms as described above.  The patient is nontoxic, has stable vital signs, no hemodynamic instability and no recent fevers chills and has no clinical exam findings point towards peritonitis.  The patient has had some issues with ovarian cysts in the past, has donated eggs but is not currently on any ovarian stimulant medications at this time.  She does not have localizing tenderness, no reproducible tenderness at this time and my overall concerns would be for her urinary urgency and the possibility of an insidious urinary tract infection and early pyelitis but she clinically does not show signs of pyelonephritis.  She will remain n.p.o. and fluids given in case this is nephrolithiasis.  Consideration of ovarian cyst as patient has had these in the past and I will wait until her urinalysis comes back to make further determination at imaging.    Lab work shows very subtle leukocytosis at 12.1 with no bandemia.  No gross electrolyte abnormalities or CIARA.  No evidence of pancreatitis and the patient has a negative pregnancy test.  Urinalysis is somewhat just colored because of concurrent use of use.  There are some amorphous crystals present with no bacteria and no WBCs.  Patient has had urinary tract infections in the past and I do not believe that her current symptomology is most consistent with this.  With her hematuria I will obtain a CT scan.    ET scan came back showing that the patient had a 3.75 mm calcification in the right distal ureter consistent with a UVJ stone with no evidence of concurrent hydronephrosis.  There is a small left-sided renal stone but otherwise no other acute concerning intra-abdominal etiology.  I doubt that she has a concurrent infection and her vital signs remained stable and she is afebrile.  I believe  it is very reasonable to treat her for a stone that is very likely to pass spontaneously with some supportive care including adequate hydration, anti-inflammatories and course of Flomax.  With her overall timing of pain and discomfort it is very possible that she has had recurrent stones and I will send her to urology in a nonemergent fashion to get follow-up care.    Questions are addressed at the bedside, both her and spouse are nurses and they feel comfortable with returning should she have breakthrough pain, fevers or chills or other evolving changes.  Discharged home with strict return precautions.    DISPOSITION AND DISCUSSIONS  I have discussed management of the patient with the following physicians and HANNAH's:  none    Discussion of management with other QHP or appropriate source(s): None     Escalation of care considered, and ultimately not performed:acute inpatient care management, however at this time, the patient is most appropriate for outpatient management    Barriers to care at this time, including but not limited to: none.     Decision tools and prescription drugs considered including, but not limited to: flowmax/nsaids.    FINAL DIAGNOSIS  1. Right flank pain    2. Ureteral stone      Electronically signed by: Aden Harris M.D., 7/6/2025 12:08 PM       [1]   Allergies  Allergen Reactions    Clindamycin Rash     Pt reports that she gets a full body rash    Pcn [Penicillins] Rash     Pt reports that she gets a full body rash

## 2025-07-06 NOTE — ED NOTES
Pt medicated per MAR, pt agreeable. Pt states 5/10 pain at this time, denies any other needs. Call light in reach.

## 2025-07-06 NOTE — ED TRIAGE NOTES
"Belg[facility] concerns of  Chief Complaint   Patient presents with    RLQ Pain       Pt BIB self for above complaints. Reports RLQ/right flank pain x a few weeks. Outpatient US ordered for August, pain had resolved but came back today. Reports pain 9/10. Reports urinary frequency, states she had UA that was negative. Patient awake, alert, and age-appropriate. Equal/unlabored respirations. Skin pink warm dry. Denies any other sx. No known sick contacts. No further questions or concerns.    Patient medicated at home with midol, azo, and wellbutrin PTA.      Parent/guardian verbalizes understanding that patient is NPO until seen and cleared by ERP. Education provided about triage process; regarding acuities and possible wait time. Parent/guardian verbalizes understanding to inform staff of any new concerns or change in status.      BP (!) 130/90   Pulse 68   Temp 36.5 °C (97.7 °F) (Temporal)   Resp 16   Ht 1.626 m (5' 4\")   Wt 57.3 kg (126 lb 5.2 oz)   LMP 07/06/2025 (Approximate)   SpO2 96%   BMI 21.68 kg/m²    "

## 2025-07-06 NOTE — ED NOTES
PIV placed x 1 attempt by ROMIE Vega. Blood drawn and sent to lab. Line flushed, fluids infusing per MAR with no s/sx of infiltration.   Urine collected and sent to lab.   Patient reports worsening pain, ERP notified. Aware of POC and denies needs.

## 2025-07-10 NOTE — Clinical Note
REFERRAL APPROVAL NOTICE         Sent on July 10, 2025                   Belgica Mark Irizarry  [address] [place] [address]                   Dear Ms. Irizarry,    After a careful review of the medical information and benefit coverage, [facility] has processed your referral. See below for additional details.    If applicable, you must be actively enrolled with your insurance for coverage of the authorized service. If you have any questions regarding your coverage, please contact your insurance directly.    REFERRAL INFORMATION   Referral #:  [id number]  Referred-To Provider    Referred-By Provider:  Urology    Aden Harris M.D.   UROLOGY [address] Emergency Room  [address]  [facility] [address]  [phone] [address]  [facility] [address]  [phone]    Referral Start Date:  07/06/2025  Referral End Date:   07/06/2026             SCHEDULING  If you do not already have an appointment, please call [phone] to make an appointment.     MORE INFORMATION  If you do not already have a [employer] account, sign up at: [email or web address]  You can access your medical information, make appointments, see lab results, billing information, and more.  If you have questions regarding this referral, please contact  the [facility] Referrals department at:             [phone]. Monday - Friday 8:00AM - 5:00PM.     Sincerely,    [facility]

## 2025-08-03 ENCOUNTER — APPOINTMENT (OUTPATIENT)
Dept: RADIOLOGY | Facility: MEDICAL CENTER | Age: 35
End: 2025-08-03
Payer: COMMERCIAL

## 2025-08-19 ENCOUNTER — SPECIALTY PHARMACY (OUTPATIENT)
Dept: NEUROLOGY | Facility: MEDICAL CENTER | Age: 35
End: 2025-08-19
Payer: COMMERCIAL

## 2025-08-19 ENCOUNTER — PATIENT MESSAGE (OUTPATIENT)
Dept: NEUROLOGY | Facility: MEDICAL CENTER | Age: 35
End: 2025-08-19
Payer: COMMERCIAL

## 2025-08-19 ENCOUNTER — TELEPHONE (OUTPATIENT)
Dept: NEUROLOGY | Facility: MEDICAL CENTER | Age: 35
End: 2025-08-19
Payer: COMMERCIAL

## 2025-08-19 DIAGNOSIS — G43.109 MIGRAINE WITH AURA AND WITHOUT STATUS MIGRAINOSUS, NOT INTRACTABLE: ICD-10-CM

## 2025-08-19 RX ORDER — UBROGEPANT 100 MG/1
TABLET ORAL
Qty: 8 TABLET | Refills: 2 | Status: SHIPPED | OUTPATIENT
Start: 2025-08-19 | End: 2025-11-19

## 2025-08-21 ENCOUNTER — HOSPITAL ENCOUNTER (OUTPATIENT)
Dept: LAB | Facility: MEDICAL CENTER | Age: 35
End: 2025-08-21
Attending: UROLOGY
Payer: COMMERCIAL

## 2025-08-21 LAB
ANION GAP SERPL CALC-SCNC: 10 MMOL/L (ref 7–16)
BUN SERPL-MCNC: 15 MG/DL (ref 8–22)
CALCIUM SERPL-MCNC: 9.2 MG/DL (ref 8.5–10.5)
CHLORIDE SERPL-SCNC: 106 MMOL/L (ref 96–112)
CO2 SERPL-SCNC: 23 MMOL/L (ref 20–33)
CREAT SERPL-MCNC: 0.89 MG/DL (ref 0.5–1.4)
FASTING STATUS PATIENT QL REPORTED: NORMAL
GFR SERPLBLD CREATININE-BSD FMLA CKD-EPI: 87 ML/MIN/1.73 M 2
GLUCOSE SERPL-MCNC: 77 MG/DL (ref 65–99)
POTASSIUM SERPL-SCNC: 4.3 MMOL/L (ref 3.6–5.5)
SODIUM SERPL-SCNC: 139 MMOL/L (ref 135–145)

## 2025-08-21 PROCEDURE — RXMED WILLOW AMBULATORY MEDICATION CHARGE: Performed by: PSYCHIATRY & NEUROLOGY

## 2025-08-21 PROCEDURE — 80048 BASIC METABOLIC PNL TOTAL CA: CPT

## 2025-08-21 PROCEDURE — 36415 COLL VENOUS BLD VENIPUNCTURE: CPT

## 2025-08-22 ENCOUNTER — PHARMACY VISIT (OUTPATIENT)
Dept: PHARMACY | Facility: MEDICAL CENTER | Age: 35
End: 2025-08-22
Payer: COMMERCIAL

## 2025-08-25 DIAGNOSIS — F41.9 ANXIETY: Primary | ICD-10-CM

## 2025-08-25 PROCEDURE — RXMED WILLOW AMBULATORY MEDICATION CHARGE: Performed by: NURSE PRACTITIONER

## 2025-08-25 RX ORDER — HYDROXYZINE HYDROCHLORIDE 25 MG/1
12.5-25 TABLET, FILM COATED ORAL 2 TIMES DAILY PRN
Qty: 60 TABLET | Refills: 0 | Status: SHIPPED | OUTPATIENT
Start: 2025-08-25

## 2025-08-26 ENCOUNTER — PHARMACY VISIT (OUTPATIENT)
Dept: PHARMACY | Facility: MEDICAL CENTER | Age: 35
End: 2025-08-26
Payer: COMMERCIAL

## (undated) DEVICE — RESTRAINTS LIMB DISP. - (12/BX 4BX/CA)

## (undated) DEVICE — SUTURE 4-0 MONOCRYL PLUS PS-2 - 27 INCH (36/BX)

## (undated) DEVICE — KIT EVACUATER 3 SPRING PVC LF 1/8 DRAIN SIZE (10EA/CA)"

## (undated) DEVICE — TUBE CONNECT SUCTION CLEAR 120 X 1/4" (50EA/CA)"

## (undated) DEVICE — ELECTRODE DUAL RETURN W/ CORD - (50/PK)

## (undated) DEVICE — DRAPE MICROSCOPE ARMATEC 120IN X 46IN (10EA/CA)

## (undated) DEVICE — SUCTION INSTRUMENT YANKAUER BULBOUS TIP W/O VENT (50EA/CA)

## (undated) DEVICE — KIT SURGIFLO W/OUT THROMBIN - (6EA/CA)

## (undated) DEVICE — TOWEL STOP TIMEOUT SAFETY FLAG (40EA/CA)

## (undated) DEVICE — SCREW DISTRACTION 14MM YELLOW - STERILE (10EA/BX) (5TX4=20)

## (undated) DEVICE — COVER LIGHT HANDLE ALC PLUS DISP (18EA/BX)

## (undated) DEVICE — TUBE E-T HI-LO CUFF 6.5MM (10EA/BX)

## (undated) DEVICE — GOWN WARMING STANDARD FLEX - (30/CA)

## (undated) DEVICE — SPONGE GAUZESTER. 2X2 4-PL - (2/PK 50PK/BX 30BX/CS)

## (undated) DEVICE — BLADE SURGICAL #15 - (50/BX 3BX/CA)

## (undated) DEVICE — BOVIE BLADE COATED &INSULATED - 25/PK

## (undated) DEVICE — GLOVE BIOGEL SZ 6.5 SURGICAL PF LTX (50PR/BX 4BX/CA)

## (undated) DEVICE — GLOVE BIOGEL SZ 8.5 SURGICAL PF LTX - (50PR/BX 4BX/CA)

## (undated) DEVICE — SLEEVE, VASO, THIGH, MED

## (undated) DEVICE — SENSOR OXIMETER ADULT SPO2 RD SET (20EA/BX)

## (undated) DEVICE — DRAPE 36X28IN RAD CARM BND BG - (25/CA) O

## (undated) DEVICE — FORCEPS ELECTROSURGICAL DISPOSABLE CODMAN 8IN 1.5MM

## (undated) DEVICE — DRESSING TRANSPARENT FILM TEGADERM 4 X 4.75" (50EA/BX)"

## (undated) DEVICE — CANISTER SUCTION 3000ML MECHANICAL FILTER AUTO SHUTOFF MEDI-VAC NONSTERILE LF DISP  (40EA/CA)

## (undated) DEVICE — SET EXTENSION WITH 2 PORTS (48EA/CA) ***PART #2C8610 IS A SUBSTITUTE*****

## (undated) DEVICE — SYRINGE NON SAFETY 10 CC 20 GA X 1-1/2 IN (100/BX 4BX/CA)

## (undated) DEVICE — SET LEADWIRE 5 LEAD BEDSIDE DISPOSABLE ECG (1SET OF 5/EA)

## (undated) DEVICE — GOWN SURGICAL XX-LARGE - (28EA/CA) SIRUS NON REINFORCED

## (undated) DEVICE — SPONGE PEANUT - (5/PK 50PK/CA)

## (undated) DEVICE — LACTATED RINGERS INJ 1000 ML - (14EA/CA 60CA/PF)

## (undated) DEVICE — NEEDLE SPINAL NON-SAFETY 18 GA X 3 IN (25EA/BX)

## (undated) DEVICE — COVER MAYO STAND X-LG - (22EA/CA)

## (undated) DEVICE — TOOL MR8 14CM MATCH HD SYM-TRI 3MM DIAMETER (1/EA)

## (undated) DEVICE — SUTURE 3-0 VICRYL PLUS RB-1 - 8 X 18 INCH (12/BX)

## (undated) DEVICE — CHLORAPREP 26 ML APPLICATOR - ORANGE TINT(25/CA)

## (undated) DEVICE — SUTURE GENERAL

## (undated) DEVICE — CORDS BIPOLAR COAGULATION - 12FT STERILE DISP. (10EA/BX)

## (undated) DEVICE — SODIUM CHL IRRIGATION 0.9% 1000ML (12EA/CA)

## (undated) DEVICE — PACK NEURO - (2EA/CA)

## (undated) DEVICE — TUBING CLEARLINK DUO-VENT - C-FLO (48EA/CA)

## (undated) DEVICE — SHEET THYROID - (10EA/CA)